# Patient Record
Sex: FEMALE | Race: WHITE | NOT HISPANIC OR LATINO | Employment: FULL TIME | URBAN - METROPOLITAN AREA
[De-identification: names, ages, dates, MRNs, and addresses within clinical notes are randomized per-mention and may not be internally consistent; named-entity substitution may affect disease eponyms.]

---

## 2017-04-25 ENCOUNTER — OFFICE VISIT (OUTPATIENT)
Dept: GASTROENTEROLOGY | Facility: CLINIC | Age: 57
End: 2017-04-25

## 2017-04-25 VITALS
HEIGHT: 62 IN | DIASTOLIC BLOOD PRESSURE: 98 MMHG | BODY MASS INDEX: 31.8 KG/M2 | WEIGHT: 172.8 LBS | TEMPERATURE: 98.9 F | SYSTOLIC BLOOD PRESSURE: 128 MMHG | HEART RATE: 65 BPM | OXYGEN SATURATION: 96 %

## 2017-04-25 DIAGNOSIS — R93.5 ABNORMAL ABDOMINAL CT SCAN: ICD-10-CM

## 2017-04-25 DIAGNOSIS — E66.9 OBESITY, CLASS I, BMI 30-34.9: ICD-10-CM

## 2017-04-25 DIAGNOSIS — Z80.0 FAMILY HISTORY OF COLON CANCER: ICD-10-CM

## 2017-04-25 DIAGNOSIS — R10.10 UPPER ABDOMINAL PAIN: Primary | ICD-10-CM

## 2017-04-25 DIAGNOSIS — K63.5 COLON POLYPS: ICD-10-CM

## 2017-04-25 DIAGNOSIS — Z79.1 NSAID LONG-TERM USE: ICD-10-CM

## 2017-04-25 PROCEDURE — 99214 OFFICE O/P EST MOD 30 MIN: CPT | Performed by: NURSE PRACTITIONER

## 2017-04-25 RX ORDER — SIMVASTATIN 40 MG
40 TABLET ORAL NIGHTLY
COMMUNITY
End: 2017-09-06

## 2017-04-25 RX ORDER — PROPRANOLOL HYDROCHLORIDE 60 MG/1
60 TABLET ORAL DAILY
COMMUNITY
End: 2018-08-29 | Stop reason: SDUPTHER

## 2017-04-25 RX ORDER — TRAZODONE HYDROCHLORIDE 150 MG/1
150 TABLET ORAL NIGHTLY
COMMUNITY
End: 2019-05-23 | Stop reason: SDUPTHER

## 2017-04-25 RX ORDER — OMEPRAZOLE 40 MG/1
40 CAPSULE, DELAYED RELEASE ORAL DAILY
COMMUNITY
End: 2017-04-25

## 2017-04-25 RX ORDER — CETIRIZINE HYDROCHLORIDE 10 MG/1
10 TABLET ORAL DAILY
COMMUNITY
End: 2019-05-23 | Stop reason: SDUPTHER

## 2017-04-25 RX ORDER — PANTOPRAZOLE SODIUM 40 MG/1
40 TABLET, DELAYED RELEASE ORAL DAILY
Qty: 30 TABLET | Refills: 2 | Status: SHIPPED | OUTPATIENT
Start: 2017-04-25 | End: 2017-08-09 | Stop reason: SDUPTHER

## 2017-04-25 NOTE — PROGRESS NOTES
OUTPATIENT ESTABLISHED PATIENT NOTE  Patient: BIANKA FISHER : 1960  Date of Service: 2017  CC: Abdominal Pain  Assessment/Plan                                             ASSESSMENT & PLANS     Upper abdominal pain r/t stomach/duodenal ulcers vs biliary colic/common bile duct stone vs referred pain? Of endometriosis  Hx of NSAID-induced gastric ulcer .  Healed ulcer confirmed    · Start pantoprazole (PROTONIX) 40 MG EC tablet; Take 1 tablet by mouth Daily. Take first thing in the morning on an empty stomach.  Wait at least 30 min to 1hr before eating.  · Obtain outside record of blood work done at employee health  · Will consider abd US  to check for possible common bile duct stone  · Pt is encouraged avoiding all NSAIDS    Abnormal abdominal CT scan.   Endometriosis  · Follow up with OB/GYN physician    Diverticulosis (Sigmoid colon). Hx of Colon polyps (Hyperplastic). Hx of ccy  Family history of colon cancer  Obesity, Class I, BMI 30-34.9  · I will discuss w/ Dr Bob to see if a repeat colonoscopy is warranted, considering she has one done less than 2 yrs ago and w/ relatively benign findings.  Pt has no known anemia. No change in bowel habit.  If anything, in BM pattern now is more regular than before. She has minimal lifestyle risk factors.  Although CT showed mucosal thickening of the transverse colon, she has no sx of any inflammation.        DISCUSSION: The above plan was delineated in details with patient and all questions and concerns were answered.  Patient is also given contact information.  Patient is to return as scheduled or sooner if new problems arise  Subjective                                                     SUBJECTIVE   History of Present Illness  Ms. Bianka Fisher is a 57 y.o. female, longtime pt of Dr Bob's, presents to the clinic today for an evaluation of Abdominal Pain  She has hx of gastric ulcer x 1 (superficial per EGD 2011) induced by NSAIDS.  Repeat EGD,  "done on 10/20/11 by Dr. Bob, showed resolved gastric ulcer, hiatal hernia.  Patient was to continue PPI.  Hx of diverticulosis. She has had multiple colonoscopies done.  Last Colonoscopy, done on 5/4/15 by Dr. Bob, showed ascending colon polyps x 2 in the descending and sigmoid colon (hyperplastic-bx).  Patient was recommended to have a repeat colonoscopy in 5 years.  With this onset pain, pt is concerned if she has colon cancer.     Abdominal Pain  Onset 3-4 wks ago   Location Upper midline above the umbilicus, but also nonspecific generalized abdominal pain     Triggers  Worsens by Eating. No specific type food, even if she eats \"a grape\"      Severity/Quality/  Frequency Dull, ache, nagging   Pain does not wake pt up at night, but does not sleep well d/t pain     Relieving factors  Nothing.  Having a BM does not relieve pain     Associating Sx  + and - ++Bloating, abd distention.++occasional heartburn    Pt denies fever, chills, night sweats, or unintentional weight loss.Pt denies anorexia, nausea, vomiting, dysphagia, odynophagia, reflux, regurgitation, early satiety.  Pt has good appetite.       At baseline, pt has diarrhea, alternating w/ constipation. Now since the onset of abd pain, her pt is able to have BM daily w/ normal, soft stools.  Pt denies dark black stools or bright red blood in the stools, in the toilet bowl, or on the toilet tissue.     Prior Dx workup CT scan abdomen and pelvis With and without contrast, done on 4/19/17 [facility], per outside record:  showed:  · Mucosal thickening of the transverse colon.  Mild diverticulosis the sigmoid colon  · Irregular hypodense mottled appearance to the uterus.  The endometrium appears abnormally thickened at 1.8 cm.  The cervix is not well defined.    · Multiple calcified granuloma  · Liver,, intrahepatic ducts, pancreas, spleen    OB-GYN US done yesterday.  Pt does not know its result yet. Pt is yet scheduled to see MD either.    "   Blood work done at employee health and was told that liver and pancreatic levels are normal.  No anemia. Pt has no current PCP     Prior Tx Has not take Prilosec d/t lack of heartburn sx     Pertinent Hx Hx of endometriosis for many yrs  Cholecystectomy at age 26 b/c diseased gallbladder     Risk factors  Takes Aleve seldm PRN. Never a smoker.  No ETOH. Obese BMI 32     ROS:Review of Systems   Constitutional: Negative for activity change, appetite change, fatigue, fever and unexpected weight change.   HENT: Negative for hearing loss, trouble swallowing and voice change.    Eyes: Negative for visual disturbance.   Respiratory: Negative for cough, choking, chest tightness and shortness of breath.    Cardiovascular: Negative for chest pain.   Gastrointestinal: Positive for abdominal distention (bloating) and abdominal pain. Negative for anal bleeding, blood in stool, constipation, diarrhea, nausea, rectal pain and vomiting.        Heartburn   Endocrine: Negative for polydipsia and polyphagia.   Genitourinary: Negative.    Musculoskeletal: Negative for gait problem and joint swelling.   Skin: Negative for color change and rash.   Allergic/Immunologic: Negative for food allergies.   Neurological: Positive for headaches. Negative for dizziness, seizures and speech difficulty.   Hematological: Negative for adenopathy.   Psychiatric/Behavioral: Positive for sleep disturbance. Negative for confusion.     PAST MED HX: Pt  has a past medical history of GERD (gastroesophageal reflux disease); Hyperlipidemia; and Insomnia.  PAST SURG HX: Pt  has a past surgical history that includes Cholecystectomy; Colonoscopy; Tonsillectomy; and  section, classic.  FAM HX: Maternal uncle and maternal first cousin at an early age 20s-40s.   SOC HX: Pt  reports that she has never smoked. She does not have any smokeless tobacco history on file. She reports that she does not drink alcohol.  Objective                                                            OBJECTIVE   Allergy: Pt has No Known Allergies.  MEDS: •  cetirizine (zyrTEC) 10 MG tablet, Take 10 mg by mouth Daily., Disp: , Rfl:   •  omeprazole (priLOSEC) 40 MG capsule PRN  •  propranolol (INDERAL) 60 MG tablet, Take 60 mg by mouth Daily., Disp: , Rfl:   •  simvastatin (ZOCOR) 40 MG tablet, Take 40 mg by mouth Every Night., Disp: , Rfl:   •  traZODone (DESYREL) 150 MG tablet, Take 150 mg by mouth Every Night., Disp: , Rfl:     Wt Readings from Last 5 Encounters:   04/25/17 172 lb 12.8 oz (78.4 kg)   body mass index is 31.61 kg/(m^2).,Temp: 98.9 °F (37.2 °C),BP: 128/98,Heart Rate: 65   Physical Exam  General Well developed; well nourished; no acute distress.   ENT Good dentition.  Oral mucosa pink & moist without thrush or lesions.    Neck Neck supple; trachea midline. No thyromegaly   Resp CTA; no rhonchi, rales, or wheezes.  Respiration effort normal  CV RRR; normal S1, S2; no M/R/G. No lower extremity edema  GI Abd soft, NT, including LLQ deep palpation, ND, normal active bowel sounds.  Old abd scar fr ccy  Skin No rash; no lesions; no bruises.  Skin turgor normal  Musc No clubbing; no cyanosis.  Gait steady  Psych Oriented to time, place, and person.  Appropriate affect  Thank you kindly for allowing me to be part of this patient’s care.    Pt care team: Tatiana SANFORD & Too [place], [facility]--Gastroenterology  [phone]    CC: Dr.Christine Michell Torres, [place] [address] FAX:[phone]      Please note that portions of this note were completed with a voice-recognition program. Efforts were made to edit the dictations, but occasionally words are mistranscribed.

## 2017-04-25 NOTE — PATIENT INSTRUCTIONS
Avoid NSAIDs (Non-Steroid Anti-Inflammatory Drugs) products (i.e Ibuprofen, Aleve, Advil, Exedrin, BC Powder, diclofenac, meloxicam, and Naproxen, etc.). Talk to your doctor/medical provider for alternative approach for management such as physical therapy, exercises, muscle relaxants, etc.     In some cases, your doctor will have to keep you on NSAIDS products to treat your other medical conditions.  Should you need to continue any NSAID product,  please continue to take your acid reducer.  Be be aware of long-term and frequent NSAIDS' potential side effects. These include, but limited to, stomach ulcer, bleeding risks, and kidney injury. Take the medication with food and to stop if you experience any GI upset. Call if you have vomitting, abdominal pain, or black/bloody stools.

## 2017-04-26 ENCOUNTER — TELEPHONE (OUTPATIENT)
Dept: GASTROENTEROLOGY | Facility: CLINIC | Age: 57
End: 2017-04-26

## 2017-04-26 DIAGNOSIS — Z79.1 NSAID LONG-TERM USE: ICD-10-CM

## 2017-04-26 DIAGNOSIS — R10.10 UPPER ABDOMINAL PAIN: ICD-10-CM

## 2017-04-26 NOTE — TELEPHONE ENCOUNTER
Patient called to see if Tatiana discuss her case with Dr. Bob. Will Taisha be called into her prefer pharmacy.

## 2017-04-27 NOTE — TELEPHONE ENCOUNTER
Attempting to call.  No answer. Left voicemail     patient's case is discussed with Dr. Bob.        Dr. Bob agrees with my recommendation of consistent PPI daily instead of PPI PRN.  Dr. Bob also agrees with patient to follow up with her OB/GYN physician, regarding endometriosis.     No indication for repeat colonoscopy due to recent colonoscopy in 2015 is unremarkable with the exception of colon polyps and sigmoid diverticulosis, which wouldn't be contributing to her upper abdominal pain that she described.     If sx persist despite daily PPI after about 4-6 wks, then we may consider repeating an EGD d/t hx of PUD

## 2017-08-02 ENCOUNTER — CONSULT (OUTPATIENT)
Dept: CARDIOLOGY | Facility: CLINIC | Age: 57
End: 2017-08-02

## 2017-08-02 VITALS
SYSTOLIC BLOOD PRESSURE: 167 MMHG | HEIGHT: 62 IN | HEART RATE: 65 BPM | DIASTOLIC BLOOD PRESSURE: 93 MMHG | WEIGHT: 174.8 LBS | BODY MASS INDEX: 32.17 KG/M2

## 2017-08-02 DIAGNOSIS — I73.9 CLAUDICATION (HCC): ICD-10-CM

## 2017-08-02 DIAGNOSIS — E78.2 MIXED HYPERLIPIDEMIA: Primary | ICD-10-CM

## 2017-08-02 DIAGNOSIS — I70.0 ATHEROSCLEROSIS OF AORTA (HCC): ICD-10-CM

## 2017-08-02 DIAGNOSIS — R07.2 PRECORDIAL PAIN: ICD-10-CM

## 2017-08-02 PROCEDURE — 99214 OFFICE O/P EST MOD 30 MIN: CPT | Performed by: INTERNAL MEDICINE

## 2017-08-02 PROCEDURE — 93000 ELECTROCARDIOGRAM COMPLETE: CPT | Performed by: INTERNAL MEDICINE

## 2017-08-02 NOTE — PROGRESS NOTES
"[facility] at [facility]  Consultation H&P  Bianka Fisher  1960  [phone]    VISIT DATE:  08/02/2017    PCP: BRAD Pool  [address] [place] [id number]    CC:  Chief Complaint   Patient presents with   • Chest Pain     consult   • Dizziness   • Edema       ASSESSMENT:   Diagnosis Plan   1. Mixed hyperlipidemia     2. Atherosclerosis of aorta     3. Precordial pain  Adult Stress Echo Only   4. Claudication  Doppler Ankle Brachial Index Multi Level CAR       PLAN:  Stress echocardiogram for cardiac risk stratification  ABIs  We'll review most recent fasting lipid panel, goal LDL less than 100, may need to switch to a more potent statin  Patient keep blood pressure log and follow up in 4 weeks, goal less than 140/90.  Heart healthy diet(Mediterranean)    History of Present Illness   57-year-old female who is noted to have atherosclerosis of her descending aorta on recent CT abdomen.  She has long-standing history of dyslipidemia.  Blood pressures at home previously run less than 140/90.  Recent measurements have been slightly elevated.  She has had a chronic history of recurrent left-sided chest discomfort.  Reports sharp to dull left precordial chest discomfort which usually occurs at rest.  Last less than a minute.  No obvious triggers.  Nonradiating.  Has baseline shortness of breath and a class II pattern with ongoing weight gain.  Reports mild lower extremity edema which worsens throughout the day.  Also has fatigue and weakness in her legs when she walks more than a block.  Nonsmoker.  Takes propranolol for tremors.    PHYSICAL EXAMINATION:  Vitals:    08/02/17 0929   BP: 167/93   BP Location: Left arm   Patient Position: Sitting   Pulse: 65   Weight: 174 lb 12.8 oz (79.3 kg)   Height: 62\" (157.5 cm)     General Appearance:    Alert, cooperative, no distress, appears stated age   Head:    Normocephalic, without obvious abnormality, atraumatic   Eyes:    PERRL, " conjunctiva/corneas clear, EOM's intact, fundi     benign, both eyes   Ears:    Normal TM's and external ear canals, both ears   Nose:   Nares normal, septum midline, mucosa normal, no drainage    or sinus tenderness   Throat:   Lips, mucosa, and tongue normal; teeth and gums normal   Neck:   Supple, symmetrical, trachea midline, no adenopathy;     thyroid:  no enlargement/tenderness/nodules; no carotid    bruit or JVD   Back:     Symmetric, no curvature, ROM normal, no CVA tenderness   Lungs:     Clear to auscultation bilaterally, respirations unlabored   Chest Wall:    No tenderness or deformity    Heart:    Regular rate and rhythm, S1 and S2 normal, no murmur, rub   or gallop, normal carotid impulse bilaterally without bruit.   Abdomen:     Soft, non-tender, bowel sounds active all four quadrants,     no masses, no organomegaly   Extremities:   Extremities normal, atraumatic, no cyanosis, Trivial bilateral pedal and pretibial edema    Pulses:   2+ and symmetric all extremities   Skin:   Skin color, texture, turgor normal, no rashes or lesions   Lymph nodes:   Cervical, supraclavicular, and axillary nodes normal   Neurologic:   normal strength, sensation and reflexes     throughout       Diagnostic Data:    ECG 12 Lead  Date/Time: 8/2/2017 10:00 AM  Performed by: JOSE JAIN III  Authorized by: JOSE JAIN III   Rhythm: sinus rhythm  Rate: normal  Conduction: conduction normal  ST Segments: ST segments normal  T Waves: T waves normal  QRS axis: normal  Clinical impression: normal ECG          No results found for: CHLPL, TRIG, HDL, LDLDIRECT  No results found for: GLUCOSE, BUN, CREATININE, NA, K, CL, CO2, CREATININE, BUN  No results found for: HGBA1C  No results found for: WBC, HGB, HCT, PLT    PROBLEM LIST:  Patient Active Problem List   Diagnosis   • Precordial pain   • Mixed hyperlipidemia   • Claudication   • Atherosclerosis of aorta       PAST MEDICAL HX  Past Medical History:   Diagnosis Date   • Anxiety     • Asthma    • GERD (gastroesophageal reflux disease)    • Heart murmur    • Hyperlipidemia    • Insomnia    • Skin cancer        Allergies  No Known Allergies    Current Medications    Current Outpatient Prescriptions:   •  cetirizine (zyrTEC) 10 MG tablet, Take 10 mg by mouth Daily., Disp: , Rfl:   •  pantoprazole (PROTONIX) 40 MG EC tablet, Take 1 tablet by mouth Daily. Take first thing in the morning on an empty stomach.  Wait at least 30 min to 1hr before eating., Disp: 30 tablet, Rfl: 2  •  propranolol (INDERAL) 60 MG tablet, Take 60 mg by mouth Daily., Disp: , Rfl:   •  simvastatin (ZOCOR) 40 MG tablet, Take 40 mg by mouth Every Night., Disp: , Rfl:   •  traZODone (DESYREL) 150 MG tablet, Take 150 mg by mouth Every Night., Disp: , Rfl:          ROS  Review of Systems   Constitution: Positive for malaise/fatigue, night sweats and weight gain.   HENT: Positive for ear pain, headaches and hearing loss.    Eyes: Positive for blurred vision.   Cardiovascular: Positive for chest pain, leg swelling and near-syncope.   Respiratory: Positive for snoring.    Endocrine: Positive for cold intolerance.   Musculoskeletal: Positive for arthritis, back pain and muscle cramps.   Gastrointestinal: Positive for bloating, abdominal pain, change in bowel habit, constipation, diarrhea and flatus.   Neurological: Positive for loss of balance and tremors.   Psychiatric/Behavioral:        Decreased concentration   Allergic/Immunologic: Positive for environmental allergies.       SOCIAL HX  Social History     Social History   • Marital status:      Spouse name: N/A   • Number of children: N/A   • Years of education: N/A     Occupational History   • Not on file.     Social History Main Topics   • Smoking status: Never Smoker   • Smokeless tobacco: Not on file   • Alcohol use No   • Drug use: No   • Sexual activity: Defer     Other Topics Concern   • Not on file     Social History Narrative       FAMILY HX  Family History    Problem Relation Age of Onset   • Lung cancer Mother    • Skin cancer Sister    • Breast cancer Neg Hx    • Endometrial cancer Neg Hx    • Ovarian cancer Neg Hx              Valentino Bates III, MD, FACC

## 2017-08-03 ENCOUNTER — TRANSCRIBE ORDERS (OUTPATIENT)
Dept: MAMMOGRAPHY | Facility: HOSPITAL | Age: 57
End: 2017-08-03

## 2017-08-03 DIAGNOSIS — N63.0 BREAST LUMP: Primary | ICD-10-CM

## 2017-08-03 DIAGNOSIS — Z12.31 SCREENING MAMMOGRAM, ENCOUNTER FOR: Primary | ICD-10-CM

## 2017-08-04 ENCOUNTER — HOSPITAL ENCOUNTER (OUTPATIENT)
Dept: MAMMOGRAPHY | Facility: HOSPITAL | Age: 57
Discharge: HOME | End: 2017-08-04
Attending: OBSTETRICS & GYNECOLOGY | Admitting: OBSTETRICS & GYNECOLOGY

## 2017-08-04 ENCOUNTER — LAB REQUISITION (OUTPATIENT)
Dept: LAB | Facility: HOSPITAL | Age: 57
End: 2017-08-04

## 2017-08-04 ENCOUNTER — HOSPITAL ENCOUNTER (OUTPATIENT)
Dept: ULTRASOUND IMAGING | Facility: HOSPITAL | Age: 57
Discharge: HOME | End: 2017-08-04

## 2017-08-04 ENCOUNTER — TRANSCRIBE ORDERS (OUTPATIENT)
Dept: MAMMOGRAPHY | Facility: HOSPITAL | Age: 57
End: 2017-08-04

## 2017-08-04 DIAGNOSIS — N63.0 BREAST LUMP: ICD-10-CM

## 2017-08-04 DIAGNOSIS — R92.8 ABNORMAL MAMMOGRAM: Primary | ICD-10-CM

## 2017-08-04 PROCEDURE — 87205 SMEAR GRAM STAIN: CPT | Performed by: RADIOLOGY

## 2017-08-04 PROCEDURE — 77062 BREAST TOMOSYNTHESIS BI: CPT | Performed by: RADIOLOGY

## 2017-08-04 PROCEDURE — 77066 DX MAMMO INCL CAD BI: CPT | Performed by: RADIOLOGY

## 2017-08-04 PROCEDURE — 76642 ULTRASOUND BREAST LIMITED: CPT

## 2017-08-04 PROCEDURE — G0204 DX MAMMO INCL CAD BI: HCPCS

## 2017-08-04 PROCEDURE — 76642 ULTRASOUND BREAST LIMITED: CPT | Performed by: RADIOLOGY

## 2017-08-04 PROCEDURE — 87186 SC STD MICRODIL/AGAR DIL: CPT | Performed by: RADIOLOGY

## 2017-08-04 PROCEDURE — 87070 CULTURE OTHR SPECIMN AEROBIC: CPT | Performed by: RADIOLOGY

## 2017-08-04 PROCEDURE — G0279 TOMOSYNTHESIS, MAMMO: HCPCS

## 2017-08-04 PROCEDURE — 76942 ECHO GUIDE FOR BIOPSY: CPT

## 2017-08-04 PROCEDURE — 87147 CULTURE TYPE IMMUNOLOGIC: CPT | Performed by: RADIOLOGY

## 2017-08-04 PROCEDURE — 87077 CULTURE AEROBIC IDENTIFY: CPT | Performed by: RADIOLOGY

## 2017-08-04 PROCEDURE — 19000 PUNCTURE ASPIR CYST BREAST: CPT | Performed by: RADIOLOGY

## 2017-08-04 RX ORDER — LIDOCAINE HYDROCHLORIDE AND EPINEPHRINE 10; 10 MG/ML; UG/ML
10 INJECTION, SOLUTION INFILTRATION; PERINEURAL ONCE
Status: DISCONTINUED | OUTPATIENT
Start: 2017-08-04 | End: 2017-09-06

## 2017-08-04 RX ORDER — LIDOCAINE HYDROCHLORIDE 10 MG/ML
5 INJECTION, SOLUTION INFILTRATION; PERINEURAL ONCE
Status: COMPLETED | OUTPATIENT
Start: 2017-08-04 | End: 2017-08-04

## 2017-08-04 RX ADMIN — LIDOCAINE HYDROCHLORIDE 4 ML: 10 INJECTION, SOLUTION INFILTRATION; PERINEURAL at 13:15

## 2017-08-09 ENCOUNTER — HOSPITAL ENCOUNTER (OUTPATIENT)
Dept: MAMMOGRAPHY | Facility: HOSPITAL | Age: 57
Discharge: HOME | End: 2017-08-09

## 2017-08-09 ENCOUNTER — HOSPITAL ENCOUNTER (OUTPATIENT)
Dept: ULTRASOUND IMAGING | Facility: HOSPITAL | Age: 57
Discharge: HOME | End: 2017-08-09
Admitting: RADIOLOGY

## 2017-08-09 ENCOUNTER — TELEPHONE (OUTPATIENT)
Dept: GASTROENTEROLOGY | Facility: CLINIC | Age: 57
End: 2017-08-09

## 2017-08-09 DIAGNOSIS — R92.8 ABNORMAL MAMMOGRAM: ICD-10-CM

## 2017-08-09 LAB
LAB AP CASE REPORT: NORMAL
Lab: NORMAL
PATH REPORT.FINAL DX SPEC: NORMAL

## 2017-08-09 PROCEDURE — 19083 BX BREAST 1ST LESION US IMAG: CPT | Performed by: RADIOLOGY

## 2017-08-09 PROCEDURE — 77065 DX MAMMO INCL CAD UNI: CPT | Performed by: RADIOLOGY

## 2017-08-09 PROCEDURE — 88305 TISSUE EXAM BY PATHOLOGIST: CPT | Performed by: RADIOLOGY

## 2017-08-09 RX ORDER — LIDOCAINE HYDROCHLORIDE AND EPINEPHRINE 10; 10 MG/ML; UG/ML
10 INJECTION, SOLUTION INFILTRATION; PERINEURAL ONCE
Status: DISCONTINUED | OUTPATIENT
Start: 2017-08-09 | End: 2017-09-06

## 2017-08-09 RX ORDER — LIDOCAINE HYDROCHLORIDE 10 MG/ML
5 INJECTION, SOLUTION INFILTRATION; PERINEURAL ONCE
Status: DISCONTINUED | OUTPATIENT
Start: 2017-08-09 | End: 2017-09-06

## 2017-08-09 RX ORDER — PANTOPRAZOLE SODIUM 40 MG/1
40 TABLET, DELAYED RELEASE ORAL DAILY
Qty: 30 TABLET | Refills: 2 | Status: SHIPPED | OUTPATIENT
Start: 2017-08-09 | End: 2017-12-28 | Stop reason: SDUPTHER

## 2017-08-11 LAB
CYTO UR: NORMAL
LAB AP CASE REPORT: NORMAL
LAB AP CLINICAL INFORMATION: NORMAL
LAB AP DIAGNOSIS COMMENT: NORMAL
Lab: NORMAL
PATH REPORT.FINAL DX SPEC: NORMAL
PATH REPORT.GROSS SPEC: NORMAL

## 2017-08-13 LAB
BACTERIA SPEC AEROBE CULT: ABNORMAL
GRAM STN SPEC: ABNORMAL
GRAM STN SPEC: ABNORMAL

## 2017-08-21 ENCOUNTER — TELEPHONE (OUTPATIENT)
Dept: MAMMOGRAPHY | Facility: HOSPITAL | Age: 57
End: 2017-08-21

## 2017-08-23 ENCOUNTER — HOSPITAL ENCOUNTER (OUTPATIENT)
Dept: CARDIOLOGY | Facility: HOSPITAL | Age: 57
Discharge: HOME | End: 2017-08-23
Attending: INTERNAL MEDICINE

## 2017-08-23 ENCOUNTER — HOSPITAL ENCOUNTER (OUTPATIENT)
Dept: CARDIOLOGY | Facility: HOSPITAL | Age: 57
Discharge: HOME | End: 2017-08-23
Attending: INTERNAL MEDICINE | Admitting: INTERNAL MEDICINE

## 2017-08-23 VITALS
SYSTOLIC BLOOD PRESSURE: 140 MMHG | HEIGHT: 62 IN | DIASTOLIC BLOOD PRESSURE: 100 MMHG | BODY MASS INDEX: 32.02 KG/M2 | WEIGHT: 174 LBS

## 2017-08-23 DIAGNOSIS — R07.2 PRECORDIAL PAIN: ICD-10-CM

## 2017-08-23 LAB
BH CV ECHO MEAS - BSA(HAYCOCK): 1.9 M^2
BH CV ECHO MEAS - BSA: 1.8 M^2
BH CV ECHO MEAS - BZI_BMI: 31.8 KILOGRAMS/M^2
BH CV ECHO MEAS - BZI_METRIC_HEIGHT: 157.5 CM
BH CV ECHO MEAS - BZI_METRIC_WEIGHT: 78.9 KG
BH CV LOWER ARTERIAL LEFT ABI RATIO: 1.15
BH CV LOWER ARTERIAL LEFT DORSALIS PEDIS SYS MAX: 139 MMHG
BH CV LOWER ARTERIAL LEFT POST TIBIAL SYS MAX: 183 MMHG
BH CV LOWER ARTERIAL LEFT TBI RATIO: 1.25
BH CV LOWER ARTERIAL RIGHT ABI RATIO: 1.14
BH CV LOWER ARTERIAL RIGHT DORSALIS PEDIS SYS MAX: 181 MMHG
BH CV LOWER ARTERIAL RIGHT POST TIBIAL SYS MAX: 169 MMHG
BH CV LOWER ARTERIAL RIGHT TBI RATIO: 1.22
BH CV STRESS BP STAGE 1: NORMAL
BH CV STRESS BP STAGE 2: NORMAL
BH CV STRESS DURATION MIN STAGE 1: 3
BH CV STRESS DURATION MIN STAGE 2: 3
BH CV STRESS DURATION MIN STAGE 3: 1
BH CV STRESS DURATION SEC STAGE 1: 0
BH CV STRESS DURATION SEC STAGE 2: 0
BH CV STRESS DURATION SEC STAGE 3: 7
BH CV STRESS GRADE STAGE 1: 10
BH CV STRESS GRADE STAGE 2: 12
BH CV STRESS GRADE STAGE 3: 14
BH CV STRESS HR STAGE 1: 114
BH CV STRESS HR STAGE 2: 133
BH CV STRESS HR STAGE 3: 148
BH CV STRESS METS STAGE 1: 5
BH CV STRESS METS STAGE 2: 7.5
BH CV STRESS METS STAGE 3: 10
BH CV STRESS O2 STAGE 1: 95
BH CV STRESS O2 STAGE 2: 97
BH CV STRESS PROTOCOL 1: NORMAL
BH CV STRESS RECOVERY BP: NORMAL MMHG
BH CV STRESS RECOVERY HR: 86 BPM
BH CV STRESS SPEED STAGE 1: 1.7
BH CV STRESS SPEED STAGE 2: 2.5
BH CV STRESS SPEED STAGE 3: 3.4
BH CV STRESS STAGE 1: 1
BH CV STRESS STAGE 2: 2
BH CV STRESS STAGE 3: 3
BH CV VAS BP RIGHT ARM: NORMAL MMHG
MAXIMAL PREDICTED HEART RATE: 163 BPM
PERCENT MAX PREDICTED HR: 90.8 %
STRESS BASELINE BP: NORMAL MMHG
STRESS BASELINE HR: 65 BPM
STRESS PERCENT HR: 107 %
STRESS POST ESTIMATED WORKLOAD: 10.1 METS
STRESS POST EXERCISE DUR MIN: 7 MIN
STRESS POST EXERCISE DUR SEC: 7 SEC
STRESS POST PEAK BP: NORMAL MMHG
STRESS POST PEAK HR: 148 BPM
STRESS TARGET HR: 139 BPM
UPPER ARTERIAL LEFT ARM BRACHIAL SYS MAX: 159 MMHG
UPPER ARTERIAL RIGHT ARM BRACHIAL SYS MAX: 158 MMHG

## 2017-08-23 PROCEDURE — 93017 CV STRESS TEST TRACING ONLY: CPT

## 2017-08-23 PROCEDURE — 93350 STRESS TTE ONLY: CPT | Performed by: INTERNAL MEDICINE

## 2017-08-23 PROCEDURE — 93350 STRESS TTE ONLY: CPT

## 2017-08-23 PROCEDURE — 93923 UPR/LXTR ART STDY 3+ LVLS: CPT | Performed by: INTERNAL MEDICINE

## 2017-08-23 PROCEDURE — 25010000002 SULFUR HEXAFLUORIDE MICROSPH 60.7-25 MG RECONSTITUTED SUSPENSION: Performed by: INTERNAL MEDICINE

## 2017-08-23 PROCEDURE — 93923 UPR/LXTR ART STDY 3+ LVLS: CPT

## 2017-08-23 PROCEDURE — C8928 TTE W OR W/O FOL W/CON,STRES: HCPCS

## 2017-08-23 PROCEDURE — 93018 CV STRESS TEST I&R ONLY: CPT | Performed by: INTERNAL MEDICINE

## 2017-08-23 PROCEDURE — 93352 ADMIN ECG CONTRAST AGENT: CPT | Performed by: INTERNAL MEDICINE

## 2017-08-23 RX ORDER — CEPHALEXIN 500 MG/1
500 CAPSULE ORAL 2 TIMES DAILY
COMMUNITY
End: 2017-09-06

## 2017-08-23 RX ADMIN — SULFUR HEXAFLUORIDE 4 ML: KIT at 14:00

## 2017-09-06 ENCOUNTER — OFFICE VISIT (OUTPATIENT)
Dept: CARDIOLOGY | Facility: CLINIC | Age: 57
End: 2017-09-06

## 2017-09-06 VITALS
BODY MASS INDEX: 32.39 KG/M2 | SYSTOLIC BLOOD PRESSURE: 150 MMHG | DIASTOLIC BLOOD PRESSURE: 80 MMHG | HEART RATE: 66 BPM | WEIGHT: 176 LBS | HEIGHT: 62 IN

## 2017-09-06 DIAGNOSIS — E78.2 MIXED HYPERLIPIDEMIA: Primary | ICD-10-CM

## 2017-09-06 DIAGNOSIS — I70.0 ATHEROSCLEROSIS OF AORTA (HCC): ICD-10-CM

## 2017-09-06 DIAGNOSIS — I10 ESSENTIAL HYPERTENSION: ICD-10-CM

## 2017-09-06 PROCEDURE — 99214 OFFICE O/P EST MOD 30 MIN: CPT | Performed by: INTERNAL MEDICINE

## 2017-09-06 RX ORDER — LOSARTAN POTASSIUM 25 MG/1
25 TABLET ORAL DAILY
Qty: 30 TABLET | Refills: 5 | Status: SHIPPED | OUTPATIENT
Start: 2017-09-06 | End: 2017-12-18

## 2017-09-06 RX ORDER — ATORVASTATIN CALCIUM 40 MG/1
40 TABLET, FILM COATED ORAL DAILY
Qty: 30 TABLET | Refills: 5 | Status: SHIPPED | OUTPATIENT
Start: 2017-09-06 | End: 2017-12-18 | Stop reason: SDUPTHER

## 2017-09-06 NOTE — PROGRESS NOTES
"[facility] at [facility]  Office visit  Bianka Fisher  1960  [phone]    VISIT DATE:  09/06/2017    PCP: BRAD Pool  [address] [address]    CC:  Chief Complaint   Patient presents with   • Hypertension     follow up       ASSESSMENT:   Diagnosis Plan   1. Mixed hyperlipidemia  Lipid Panel    Comprehensive Metabolic Panel   2. Atherosclerosis of aorta     3. Essential hypertension         PLAN:  Switching simvastatin to atorvastatin, goal LDL less than 100  Continue propranolol  Adding losartan 25 mg by mouth daily, goal blood pressure less than 140/90  Follow-up in 3 months with a CMP and lipid panel    Subjective  Systolic blood pressures are still running in the 140-175 mmHg range.  Intermittent left sided sharp stabbing chest pain which worsens with certain movements.  brief in nature.  Reviewed stress echocardiogram which was unremarkable.  ABIs negative for obstructive atherosclerosis.    PHYSICAL EXAMINATION:  Vitals:    09/06/17 1514   BP: 150/80   BP Location: Left arm   Patient Position: Sitting   Pulse: 66   Weight: 176 lb (79.8 kg)   Height: 62\" (157.5 cm)     General Appearance:    Alert, cooperative, no distress, appears stated age   Head:    Normocephalic, without obvious abnormality, atraumatic   Eyes:    conjunctiva/corneas clear   Nose:   Nares normal, septum midline, mucosa normal, no drainage   Throat:   Lips, teeth and gums normal   Neck:   Supple, symmetrical, trachea midline, no carotid    bruit or JVD   Lungs:     Clear to auscultation bilaterally, respirations unlabored   Chest Wall:    No tenderness or deformity    Heart:    Regular rate and rhythm, S1 and S2 normal, no murmur, rub   or gallop, normal carotid impulse bilaterally without bruit.   Abdomen:     Soft, non-tender   Extremities:   Extremities normal, atraumatic, no cyanosis or edema   Pulses:   2+ and symmetric all extremities   Skin:   Skin color, texture, turgor " normal, no rashes or lesions       Diagnostic Data:  Procedures  No results found for: CHLPL, TRIG, HDL, LDLDIRECT  No results found for: GLUCOSE, BUN, CREATININE, NA, K, CL, CO2, CREATININE, BUN  No results found for: HGBA1C  No results found for: WBC, HGB, HCT, PLT    Allergies  No Known Allergies    Current Medications    Current Outpatient Prescriptions:   •  cetirizine (zyrTEC) 10 MG tablet, Take 10 mg by mouth Daily., Disp: , Rfl:   •  pantoprazole (PROTONIX) 40 MG EC tablet, Take 1 tablet by mouth Daily. Take first thing in the morning on an empty stomach.  Wait at least 30 min to 1hr before eating., Disp: 30 tablet, Rfl: 2  •  propranolol (INDERAL) 60 MG tablet, Take 60 mg by mouth Daily., Disp: , Rfl:   •  traZODone (DESYREL) 150 MG tablet, Take 150 mg by mouth Every Night., Disp: , Rfl:   •  atorvastatin (LIPITOR) 40 MG tablet, Take 1 tablet by mouth Daily., Disp: 30 tablet, Rfl: 5  •  losartan (COZAAR) 25 MG tablet, Take 1 tablet by mouth Daily., Disp: 30 tablet, Rfl: 5  No current facility-administered medications for this visit.           ROS  Review of Systems   Constitution: Positive for night sweats and weight loss.   HENT: Positive for ear pain and headaches.    Cardiovascular: Positive for chest pain and leg swelling.   Neurological: Positive for tremors.   Allergic/Immunologic: Positive for environmental allergies.       SOCIAL HX  Social History     Social History   • Marital status:      Spouse name: N/A   • Number of children: N/A   • Years of education: N/A     Occupational History   • Not on file.     Social History Main Topics   • Smoking status: Never Smoker   • Smokeless tobacco: Not on file   • Alcohol use No   • Drug use: No   • Sexual activity: Defer     Other Topics Concern   • Not on file     Social History Narrative       FAMILY HX  Family History   Problem Relation Age of Onset   • Lung cancer Mother    • Skin cancer Sister    • Breast cancer Neg Hx    • Endometrial cancer  Neg Hx    • Ovarian cancer Neg Hx              Valentino Bates III, MD, FACC

## 2017-12-18 ENCOUNTER — APPOINTMENT (OUTPATIENT)
Dept: LAB | Facility: HOSPITAL | Age: 57
End: 2017-12-18

## 2017-12-18 ENCOUNTER — OFFICE VISIT (OUTPATIENT)
Dept: CARDIOLOGY | Facility: CLINIC | Age: 57
End: 2017-12-18

## 2017-12-18 VITALS
WEIGHT: 173.4 LBS | BODY MASS INDEX: 31.91 KG/M2 | SYSTOLIC BLOOD PRESSURE: 142 MMHG | DIASTOLIC BLOOD PRESSURE: 82 MMHG | HEIGHT: 62 IN

## 2017-12-18 DIAGNOSIS — I10 ESSENTIAL HYPERTENSION: ICD-10-CM

## 2017-12-18 DIAGNOSIS — E78.2 MIXED HYPERLIPIDEMIA: Primary | ICD-10-CM

## 2017-12-18 DIAGNOSIS — I70.0 ATHEROSCLEROSIS OF AORTA (HCC): ICD-10-CM

## 2017-12-18 LAB
ALBUMIN SERPL-MCNC: 4.2 G/DL (ref 3.2–4.8)
ALBUMIN/GLOB SERPL: 1.8 G/DL (ref 1.5–2.5)
ALP SERPL-CCNC: 98 U/L (ref 25–100)
ALT SERPL W P-5'-P-CCNC: 20 U/L (ref 7–40)
ANION GAP SERPL CALCULATED.3IONS-SCNC: 8 MMOL/L (ref 3–11)
ARTICHOKE IGE QN: 109 MG/DL (ref 0–130)
AST SERPL-CCNC: 20 U/L (ref 0–33)
BILIRUB SERPL-MCNC: 0.6 MG/DL (ref 0.3–1.2)
BUN BLD-MCNC: 11 MG/DL (ref 9–23)
BUN/CREAT SERPL: 11 (ref 7–25)
CALCIUM SPEC-SCNC: 9.3 MG/DL (ref 8.7–10.4)
CHLORIDE SERPL-SCNC: 105 MMOL/L (ref 99–109)
CHOLEST SERPL-MCNC: 160 MG/DL (ref 0–200)
CO2 SERPL-SCNC: 28 MMOL/L (ref 20–31)
CREAT BLD-MCNC: 1 MG/DL (ref 0.6–1.3)
GFR SERPL CREATININE-BSD FRML MDRD: 57 ML/MIN/1.73
GLOBULIN UR ELPH-MCNC: 2.3 GM/DL
GLUCOSE BLD-MCNC: 103 MG/DL (ref 70–100)
HDLC SERPL-MCNC: 42 MG/DL (ref 40–60)
POTASSIUM BLD-SCNC: 3.8 MMOL/L (ref 3.5–5.5)
PROT SERPL-MCNC: 6.5 G/DL (ref 5.7–8.2)
SODIUM BLD-SCNC: 141 MMOL/L (ref 132–146)
TRIGL SERPL-MCNC: 113 MG/DL (ref 0–150)

## 2017-12-18 PROCEDURE — 99213 OFFICE O/P EST LOW 20 MIN: CPT | Performed by: INTERNAL MEDICINE

## 2017-12-18 PROCEDURE — 80053 COMPREHEN METABOLIC PANEL: CPT | Performed by: INTERNAL MEDICINE

## 2017-12-18 PROCEDURE — 36415 COLL VENOUS BLD VENIPUNCTURE: CPT | Performed by: INTERNAL MEDICINE

## 2017-12-18 PROCEDURE — 80061 LIPID PANEL: CPT | Performed by: INTERNAL MEDICINE

## 2017-12-18 RX ORDER — ATORVASTATIN CALCIUM 40 MG/1
40 TABLET, FILM COATED ORAL DAILY
Qty: 90 TABLET | Refills: 1 | Status: SHIPPED | OUTPATIENT
Start: 2017-12-18 | End: 2018-10-29

## 2017-12-18 RX ORDER — LOSARTAN POTASSIUM 50 MG/1
50 TABLET ORAL DAILY
Qty: 90 TABLET | Refills: 1 | Status: SHIPPED | OUTPATIENT
Start: 2017-12-18 | End: 2018-08-27

## 2017-12-18 NOTE — PROGRESS NOTES
"[facility] at [facility]  Office visit  Bianka Fisher  1960  [phone]    VISIT DATE:  09/06/2017    PCP: Perri Torres, PA  [address] [address]    CC:  Chief Complaint   Patient presents with   • Chest Pain   • Hyperlipidemia   • Hypertension       ASSESSMENT:   Diagnosis Plan   1. Mixed hyperlipidemia     2. Atherosclerosis of aorta     3. Essential hypertension         PLAN:  Continue atorvastatin, goal LDL less than 100  Repeat fasting lipid panel pending.  Continue propranolol(predominantly being used for essential tremor)  Increasing losartan to 50 mg by mouth daily, goal blood pressure less than 130/80  Follow-up in 3 months      Subjective  Systolic blood pressures are still running in the 140-150 mmHg range.  Currently denies chest pain, dyspnea or palpitations.  She appears compliant with medical therapy.  Denies myalgias on statin.  Has not had a chance to have her lipid profile repeated.    PHYSICAL EXAMINATION:  Vitals:    12/18/17 1445   BP: 142/82   BP Location: Left arm   Patient Position: Sitting   Weight: 78.7 kg (173 lb 6.4 oz)   Height: 157.5 cm (62\")     General Appearance:    Alert, cooperative, no distress, appears stated age   Head:    Normocephalic, without obvious abnormality, atraumatic   Eyes:    conjunctiva/corneas clear   Nose:   Nares normal, septum midline, mucosa normal, no drainage   Throat:   Lips, teeth and gums normal   Neck:   Supple, symmetrical, trachea midline, no carotid    bruit or JVD   Lungs:     Clear to auscultation bilaterally, respirations unlabored   Chest Wall:    No tenderness or deformity    Heart:    Regular rate and rhythm, S1 and S2 normal, no murmur, rub   or gallop, normal carotid impulse bilaterally without bruit.   Abdomen:     Soft, non-tender   Extremities:   Extremities normal, atraumatic, no cyanosis or edema   Pulses:   2+ and symmetric all extremities   Skin:   Skin color, texture, turgor normal, " no rashes or lesions       Diagnostic Data:  Procedures  No results found for: CHLPL, TRIG, HDL, LDLDIRECT  No results found for: GLUCOSE, BUN, CREATININE, NA, K, CL, CO2, CREATININE, BUN  No results found for: HGBA1C  No results found for: WBC, HGB, HCT, PLT    Allergies  No Known Allergies    Current Medications    Current Outpatient Prescriptions:   •  atorvastatin (LIPITOR) 40 MG tablet, Take 1 tablet by mouth Daily., Disp: 90 tablet, Rfl: 1  •  cetirizine (zyrTEC) 10 MG tablet, Take 10 mg by mouth Daily., Disp: , Rfl:   •  pantoprazole (PROTONIX) 40 MG EC tablet, Take 1 tablet by mouth Daily. Take first thing in the morning on an empty stomach.  Wait at least 30 min to 1hr before eating., Disp: 30 tablet, Rfl: 2  •  propranolol (INDERAL) 60 MG tablet, Take 60 mg by mouth Daily., Disp: , Rfl:   •  traZODone (DESYREL) 150 MG tablet, Take 150 mg by mouth Every Night., Disp: , Rfl:   •  losartan (COZAAR) 50 MG tablet, Take 1 tablet by mouth Daily., Disp: 90 tablet, Rfl: 1          ROS  Review of Systems   Cardiovascular: Negative for chest pain, dyspnea on exertion, leg swelling and palpitations.   Respiratory: Positive for cough. Negative for wheezing.    Neurological: Positive for headaches and tremors.   Allergic/Immunologic: Positive for environmental allergies.       SOCIAL HX  Social History     Social History   • Marital status:      Spouse name: N/A   • Number of children: N/A   • Years of education: N/A     Occupational History   • Not on file.     Social History Main Topics   • Smoking status: Never Smoker   • Smokeless tobacco: Never Used   • Alcohol use No   • Drug use: No      Comment: quit when pt was in 20's   • Sexual activity: Defer     Other Topics Concern   • Not on file     Social History Narrative       FAMILY HX  Family History   Problem Relation Age of Onset   • Lung cancer Mother    • Skin cancer Sister    • Breast cancer Neg Hx    • Endometrial cancer Neg Hx    • Ovarian cancer Neg  Hx              Valentino Bates III, MD, FACC

## 2017-12-28 ENCOUNTER — TELEPHONE (OUTPATIENT)
Dept: GASTROENTEROLOGY | Facility: CLINIC | Age: 57
End: 2017-12-28

## 2017-12-28 DIAGNOSIS — Z79.1 NSAID LONG-TERM USE: ICD-10-CM

## 2017-12-28 DIAGNOSIS — R10.10 UPPER ABDOMINAL PAIN: ICD-10-CM

## 2017-12-28 RX ORDER — PANTOPRAZOLE SODIUM 40 MG/1
40 TABLET, DELAYED RELEASE ORAL DAILY
Qty: 30 TABLET | Refills: 5 | Status: SHIPPED | OUTPATIENT
Start: 2017-12-28 | End: 2018-10-29

## 2017-12-28 NOTE — TELEPHONE ENCOUNTER
"Patient called to see if you can refill her Pantoprazole 40 mg. Last seen seen six months ago by Modesto Duran. I tried to explained to patient that Tatiana was out of the office until 01/02/2018. She could get some Nexium or Omeprazole OTC or call her PCP office to see if he/she will refill medication until Tatiana return on Tuesday to see if she will approve refill. Patient was very rude with me on the phone. 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 He hasn't seen patient since 2015.        Please refill her generic Protonix for 6 months 40 mg #30 one a day with 6 refills  Dr. Bob  "

## 2017-12-28 NOTE — TELEPHONE ENCOUNTER
Called patient back. Informed her that Dr Bob agreed to fill medication for 6 months. No additional refills until patient comes back in for follow up with him or Tatiana. Patient voiced understanding.

## 2018-07-25 ENCOUNTER — TRANSCRIBE ORDERS (OUTPATIENT)
Dept: ADMINISTRATIVE | Facility: HOSPITAL | Age: 58
End: 2018-07-25

## 2018-07-25 DIAGNOSIS — R92.8 ABNORMAL MAMMOGRAM: Primary | ICD-10-CM

## 2018-08-01 ENCOUNTER — HOSPITAL ENCOUNTER (OUTPATIENT)
Dept: ULTRASOUND IMAGING | Facility: HOSPITAL | Age: 58
Discharge: HOME | End: 2018-08-01

## 2018-08-01 ENCOUNTER — HOSPITAL ENCOUNTER (OUTPATIENT)
Dept: MAMMOGRAPHY | Facility: HOSPITAL | Age: 58
Discharge: HOME | End: 2018-08-01
Attending: OBSTETRICS & GYNECOLOGY | Admitting: OBSTETRICS & GYNECOLOGY

## 2018-08-01 DIAGNOSIS — R92.8 ABNORMAL MAMMOGRAM: ICD-10-CM

## 2018-08-01 PROCEDURE — G0279 TOMOSYNTHESIS, MAMMO: HCPCS

## 2018-08-01 PROCEDURE — 77066 DX MAMMO INCL CAD BI: CPT | Performed by: RADIOLOGY

## 2018-08-01 PROCEDURE — 76642 ULTRASOUND BREAST LIMITED: CPT | Performed by: RADIOLOGY

## 2018-08-01 PROCEDURE — 77062 BREAST TOMOSYNTHESIS BI: CPT | Performed by: RADIOLOGY

## 2018-08-01 PROCEDURE — 76642 ULTRASOUND BREAST LIMITED: CPT

## 2018-08-01 PROCEDURE — 77066 DX MAMMO INCL CAD BI: CPT

## 2018-08-27 ENCOUNTER — OFFICE VISIT (OUTPATIENT)
Dept: CARDIOLOGY | Facility: CLINIC | Age: 58
End: 2018-08-27

## 2018-08-27 VITALS
BODY MASS INDEX: 31.1 KG/M2 | HEART RATE: 57 BPM | OXYGEN SATURATION: 99 % | DIASTOLIC BLOOD PRESSURE: 88 MMHG | HEIGHT: 62 IN | WEIGHT: 169 LBS | SYSTOLIC BLOOD PRESSURE: 150 MMHG

## 2018-08-27 DIAGNOSIS — I10 ESSENTIAL HYPERTENSION: ICD-10-CM

## 2018-08-27 DIAGNOSIS — I70.0 ATHEROSCLEROSIS OF AORTA (HCC): ICD-10-CM

## 2018-08-27 DIAGNOSIS — E78.2 MIXED HYPERLIPIDEMIA: Primary | ICD-10-CM

## 2018-08-27 PROCEDURE — 99213 OFFICE O/P EST LOW 20 MIN: CPT | Performed by: INTERNAL MEDICINE

## 2018-08-27 RX ORDER — LOSARTAN POTASSIUM AND HYDROCHLOROTHIAZIDE 12.5; 1 MG/1; MG/1
1 TABLET ORAL DAILY
Qty: 30 TABLET | Refills: 5 | Status: SHIPPED | OUTPATIENT
Start: 2018-08-27 | End: 2019-03-06 | Stop reason: SDUPTHER

## 2018-08-27 NOTE — PROGRESS NOTES
[facility] at [facility]  Office visit  Bianka Fisher  1960  [phone]    VISIT DATE:  09/06/2017    PCP: Cierra Adamson MD  [address] [place] [id number]    CC:  No chief complaint on file.      ASSESSMENT:   Diagnosis Plan   1. Mixed hyperlipidemia     2. Essential hypertension         PLAN:  Continue atorvastatin, goal LDL less than 100  Repeat fasting lipid panel pending.  Continue propranolol(predominantly being used for essential tremor)  Switching losartan 50 mg by mouth daily toe combination losartan 100 mg and hydrochlorothiazide 12.5 mg, goal blood pressure less than 130/80  Repeat BMP in 3-4 weeks, follow-up in 6-8 weeks    Subjective  Systolic blood pressures are still running in the 140-160 mmHg range.  Currently denies chest pain, dyspnea or palpitations.  She appears compliant with medical therapy.  Denies myalgias on statin.  Has not had a chance to have her lipid profile repeated.    PHYSICAL EXAMINATION:  There were no vitals filed for this visit.  General Appearance:    Alert, cooperative, no distress, appears stated age   Head:    Normocephalic, without obvious abnormality, atraumatic   Eyes:    conjunctiva/corneas clear   Nose:   Nares normal, septum midline, mucosa normal, no drainage   Throat:   Lips, teeth and gums normal   Neck:   Supple, symmetrical, trachea midline, no carotid    bruit or JVD   Lungs:     Clear to auscultation bilaterally, respirations unlabored   Chest Wall:    No tenderness or deformity    Heart:    Regular rate and rhythm, S1 and S2 normal, no murmur, rub   or gallop, normal carotid impulse bilaterally without bruit.   Abdomen:     Soft, non-tender   Extremities:   Extremities normal, atraumatic, no cyanosis or edema   Pulses:   2+ and symmetric all extremities   Skin:   Skin color, texture, turgor normal, no rashes or lesions       Diagnostic Data:  Procedures  Lab Results   Component Value Date    TRIG 113 12/18/2017    HDL  42 12/18/2017     Lab Results   Component Value Date    GLUCOSE 103 (H) 12/18/2017    BUN 11 12/18/2017    CREATININE 1.00 12/18/2017     12/18/2017    K 3.8 12/18/2017     12/18/2017    CO2 28.0 12/18/2017     No results found for: HGBA1C  No results found for: WBC, HGB, HCT, PLT    Allergies  No Known Allergies    Current Medications    Current Outpatient Prescriptions:   •  atorvastatin (LIPITOR) 40 MG tablet, Take 1 tablet by mouth Daily., Disp: 90 tablet, Rfl: 1  •  cetirizine (zyrTEC) 10 MG tablet, Take 10 mg by mouth Daily., Disp: , Rfl:   •  losartan (COZAAR) 50 MG tablet, Take 1 tablet by mouth Daily., Disp: 90 tablet, Rfl: 1  •  pantoprazole (PROTONIX) 40 MG EC tablet, Take 1 tablet by mouth Daily. Take first thing in the morning on an empty stomach.  Wait at least 30 min to 1hr before eating., Disp: 30 tablet, Rfl: 5  •  propranolol (INDERAL) 60 MG tablet, Take 60 mg by mouth Daily., Disp: , Rfl:   •  traZODone (DESYREL) 150 MG tablet, Take 150 mg by mouth Every Night., Disp: , Rfl:           ROS  Review of Systems   Cardiovascular: Negative for chest pain, dyspnea on exertion, leg swelling and palpitations.   Respiratory: Positive for cough. Negative for wheezing.    Neurological: Positive for headaches and tremors.   Allergic/Immunologic: Positive for environmental allergies.       SOCIAL HX  Social History     Social History   • Marital status:      Spouse name: N/A   • Number of children: N/A   • Years of education: N/A     Occupational History   • Not on file.     Social History Main Topics   • Smoking status: Never Smoker   • Smokeless tobacco: Never Used   • Alcohol use No   • Drug use: No      Comment: quit when pt was in 20's   • Sexual activity: Defer     Other Topics Concern   • Not on file     Social History Narrative   • No narrative on file       FAMILY HX  Family History   Problem Relation Age of Onset   • Lung cancer Mother    • Skin cancer Sister    • Breast cancer Neg  Hx    • Endometrial cancer Neg Hx    • Ovarian cancer Neg Hx              Valentino Bates III, MD, FACC

## 2018-08-29 RX ORDER — PROPRANOLOL HYDROCHLORIDE 60 MG/1
60 TABLET ORAL DAILY
Qty: 30 TABLET | Refills: 5 | Status: SHIPPED | OUTPATIENT
Start: 2018-08-29 | End: 2019-06-28 | Stop reason: SDUPTHER

## 2018-10-04 ENCOUNTER — APPOINTMENT (OUTPATIENT)
Dept: MAMMOGRAPHY | Facility: HOSPITAL | Age: 58
End: 2018-10-04
Attending: OBSTETRICS & GYNECOLOGY

## 2018-10-26 ENCOUNTER — LAB (OUTPATIENT)
Dept: LAB | Facility: HOSPITAL | Age: 58
End: 2018-10-26

## 2018-10-26 DIAGNOSIS — I10 ESSENTIAL HYPERTENSION: ICD-10-CM

## 2018-10-26 LAB
ALBUMIN SERPL-MCNC: 4.25 G/DL (ref 3.2–4.8)
ALBUMIN/GLOB SERPL: 2.4 G/DL (ref 1.5–2.5)
ALP SERPL-CCNC: 86 U/L (ref 25–100)
ALT SERPL W P-5'-P-CCNC: 20 U/L (ref 7–40)
ANION GAP SERPL CALCULATED.3IONS-SCNC: 6 MMOL/L (ref 3–11)
ARTICHOKE IGE QN: 121 MG/DL (ref 0–130)
AST SERPL-CCNC: 24 U/L (ref 0–33)
BILIRUB SERPL-MCNC: 0.8 MG/DL (ref 0.3–1.2)
BUN BLD-MCNC: 10 MG/DL (ref 9–23)
BUN/CREAT SERPL: 9.9 (ref 7–25)
CALCIUM SPEC-SCNC: 9.3 MG/DL (ref 8.7–10.4)
CHLORIDE SERPL-SCNC: 104 MMOL/L (ref 99–109)
CHOLEST SERPL-MCNC: 180 MG/DL (ref 0–200)
CO2 SERPL-SCNC: 29 MMOL/L (ref 20–31)
CREAT BLD-MCNC: 1.01 MG/DL (ref 0.6–1.3)
GFR SERPL CREATININE-BSD FRML MDRD: 56 ML/MIN/1.73
GLOBULIN UR ELPH-MCNC: 1.8 GM/DL
GLUCOSE BLD-MCNC: 91 MG/DL (ref 70–100)
HDLC SERPL-MCNC: 49 MG/DL (ref 40–60)
POTASSIUM BLD-SCNC: 3.7 MMOL/L (ref 3.5–5.5)
PROT SERPL-MCNC: 6 G/DL (ref 5.7–8.2)
SODIUM BLD-SCNC: 139 MMOL/L (ref 132–146)
TRIGL SERPL-MCNC: 91 MG/DL (ref 0–150)

## 2018-10-26 PROCEDURE — 80061 LIPID PANEL: CPT

## 2018-10-26 PROCEDURE — 80053 COMPREHEN METABOLIC PANEL: CPT

## 2018-10-26 PROCEDURE — 36415 COLL VENOUS BLD VENIPUNCTURE: CPT

## 2018-10-29 ENCOUNTER — OFFICE VISIT (OUTPATIENT)
Dept: CARDIOLOGY | Facility: CLINIC | Age: 58
End: 2018-10-29

## 2018-10-29 VITALS
BODY MASS INDEX: 31.28 KG/M2 | WEIGHT: 170 LBS | HEIGHT: 62 IN | SYSTOLIC BLOOD PRESSURE: 108 MMHG | HEART RATE: 73 BPM | DIASTOLIC BLOOD PRESSURE: 60 MMHG

## 2018-10-29 DIAGNOSIS — I10 ESSENTIAL HYPERTENSION: Primary | ICD-10-CM

## 2018-10-29 DIAGNOSIS — I70.0 ATHEROSCLEROSIS OF AORTA (HCC): ICD-10-CM

## 2018-10-29 DIAGNOSIS — E78.2 MIXED HYPERLIPIDEMIA: ICD-10-CM

## 2018-10-29 PROCEDURE — 99213 OFFICE O/P EST LOW 20 MIN: CPT | Performed by: INTERNAL MEDICINE

## 2018-10-29 RX ORDER — ROSUVASTATIN CALCIUM 40 MG/1
40 TABLET, COATED ORAL DAILY
Qty: 30 TABLET | Refills: 5 | Status: SHIPPED | OUTPATIENT
Start: 2018-10-29 | End: 2019-02-01

## 2018-10-29 NOTE — PROGRESS NOTES
[facility] at [facility]  Office visit  Bianka Fisher  1960  [phone]    VISIT DATE:  09/06/2017    PCP: Provider, No Known  [place] [id number]    CC:  No chief complaint on file.      ASSESSMENT:   Diagnosis Plan   1. Essential hypertension     2. Mixed hyperlipidemia     3. Atherosclerosis of aorta (CMS/HCC)         PLAN:  Switching atorvastatin 40 mg by mouth daily to rosuvastatin 40 mg by mouth daily, goal LDL less than 100  Continue propranolol(predominantly being used for essential tremor)  Continue losartan 100 mg -hydrochlorothiazide 12.5 mg, goal blood pressure less than 130/80      Subjective  Blood pressures probably running less than 140/90 mmHg..  Currently denies chest pain, dyspnea or palpitations.  She appears compliant with medical therapy.  Denies myalgias on statin.  Reviewed most recent fasting lipid panel.  Intermittent dizziness which appears to be positional in nature.  Intermittent bitemporal headaches over the previous month, arranging for initial evaluation and subsequent follow-up with primary care physician.    PHYSICAL EXAMINATION:  There were no vitals filed for this visit.  General Appearance:    Alert, cooperative, no distress, appears stated age   Head:    Normocephalic, without obvious abnormality, atraumatic   Eyes:    conjunctiva/corneas clear   Nose:   Nares normal, septum midline, mucosa normal, no drainage   Throat:   Lips, teeth and gums normal   Neck:   Supple, symmetrical, trachea midline, no carotid    bruit or JVD   Lungs:     Clear to auscultation bilaterally, respirations unlabored   Chest Wall:    No tenderness or deformity    Heart:    Regular rate and rhythm, S1 and S2 normal, no murmur, rub   or gallop, normal carotid impulse bilaterally without bruit.   Abdomen:     Soft, non-tender   Extremities:   Extremities normal, atraumatic, no cyanosis or edema   Pulses:   2+ and symmetric all extremities   Skin:   Skin color,  texture, turgor normal, no rashes or lesions       Diagnostic Data:  Procedures  Lab Results   Component Value Date    TRIG 91 10/26/2018    HDL 49 10/26/2018     Lab Results   Component Value Date    GLUCOSE 91 10/26/2018    BUN 10 10/26/2018    CREATININE 1.01 10/26/2018     10/26/2018    K 3.7 10/26/2018     10/26/2018    CO2 29.0 10/26/2018     No results found for: HGBA1C  No results found for: WBC, HGB, HCT, PLT    Allergies  No Known Allergies    Current Medications    Current Outpatient Prescriptions:   •  atorvastatin (LIPITOR) 40 MG tablet, Take 1 tablet by mouth Daily., Disp: 90 tablet, Rfl: 1  •  cetirizine (zyrTEC) 10 MG tablet, Take 10 mg by mouth Daily., Disp: , Rfl:   •  losartan-hydrochlorothiazide (HYZAAR) 100-12.5 MG per tablet, Take 1 tablet by mouth Daily., Disp: 30 tablet, Rfl: 5  •  pantoprazole (PROTONIX) 40 MG EC tablet, Take 1 tablet by mouth Daily. Take first thing in the morning on an empty stomach.  Wait at least 30 min to 1hr before eating., Disp: 30 tablet, Rfl: 5  •  propranolol (INDERAL) 60 MG tablet, Take 1 tablet by mouth Daily., Disp: 30 tablet, Rfl: 5  •  traZODone (DESYREL) 150 MG tablet, Take 150 mg by mouth Every Night., Disp: , Rfl:           ROS  Review of Systems   Cardiovascular: Negative for chest pain, dyspnea on exertion, leg swelling and palpitations.   Respiratory: Positive for cough, shortness of breath and snoring. Negative for wheezing.    Neurological: Positive for dizziness.   Allergic/Immunologic: Positive for environmental allergies.       SOCIAL HX  Social History     Social History   • Marital status:      Spouse name: N/A   • Number of children: N/A   • Years of education: N/A     Occupational History   • Not on file.     Social History Main Topics   • Smoking status: Never Smoker   • Smokeless tobacco: Never Used   • Alcohol use No   • Drug use: No      Comment: quit when pt was in 20's   • Sexual activity: Defer     Other Topics Concern    • Not on file     Social History Narrative   • No narrative on file       FAMILY HX  Family History   Problem Relation Age of Onset   • Lung cancer Mother    • Skin cancer Sister    • Breast cancer Neg Hx    • Endometrial cancer Neg Hx    • Ovarian cancer Neg Hx              Valentino Bates III, MD, FACC

## 2018-11-12 ENCOUNTER — OFFICE VISIT (OUTPATIENT)
Dept: FAMILY MEDICINE CLINIC | Facility: CLINIC | Age: 58
End: 2018-11-12

## 2018-11-12 ENCOUNTER — LAB (OUTPATIENT)
Dept: LAB | Facility: HOSPITAL | Age: 58
End: 2018-11-12

## 2018-11-12 VITALS
HEIGHT: 62 IN | WEIGHT: 172 LBS | DIASTOLIC BLOOD PRESSURE: 70 MMHG | HEART RATE: 70 BPM | BODY MASS INDEX: 31.65 KG/M2 | OXYGEN SATURATION: 96 % | SYSTOLIC BLOOD PRESSURE: 110 MMHG

## 2018-11-12 DIAGNOSIS — R53.83 FATIGUE, UNSPECIFIED TYPE: ICD-10-CM

## 2018-11-12 DIAGNOSIS — Z82.0 FAMILY HISTORY OF ALZHEIMER'S DISEASE: ICD-10-CM

## 2018-11-12 DIAGNOSIS — I10 ESSENTIAL HYPERTENSION: Primary | ICD-10-CM

## 2018-11-12 DIAGNOSIS — G25.0 TREMOR, ESSENTIAL: ICD-10-CM

## 2018-11-12 DIAGNOSIS — R41.3 MEMORY DISTURBANCE: ICD-10-CM

## 2018-11-12 DIAGNOSIS — E78.2 MIXED HYPERLIPIDEMIA: ICD-10-CM

## 2018-11-12 DIAGNOSIS — K21.9 GASTROESOPHAGEAL REFLUX DISEASE WITHOUT ESOPHAGITIS: ICD-10-CM

## 2018-11-12 DIAGNOSIS — E66.09 CLASS 1 OBESITY DUE TO EXCESS CALORIES WITH SERIOUS COMORBIDITY AND BODY MASS INDEX (BMI) OF 31.0 TO 31.9 IN ADULT: ICD-10-CM

## 2018-11-12 DIAGNOSIS — I10 ESSENTIAL HYPERTENSION: ICD-10-CM

## 2018-11-12 LAB
25(OH)D3 SERPL-MCNC: 25.2 NG/ML
BASOPHILS # BLD AUTO: 0.06 10*3/MM3 (ref 0–0.2)
BASOPHILS NFR BLD AUTO: 0.7 % (ref 0–1)
DEPRECATED RDW RBC AUTO: 46 FL (ref 37–54)
EOSINOPHIL # BLD AUTO: 0.23 10*3/MM3 (ref 0–0.3)
EOSINOPHIL NFR BLD AUTO: 2.6 % (ref 0–3)
ERYTHROCYTE [DISTWIDTH] IN BLOOD BY AUTOMATED COUNT: 14.2 % (ref 11.3–14.5)
FSH SERPL-ACNC: 146 MIU/ML
HBA1C MFR BLD: 5.9 % (ref 4.8–5.6)
HCT VFR BLD AUTO: 38.6 % (ref 34.5–44)
HGB BLD-MCNC: 12.2 G/DL (ref 11.5–15.5)
IMM GRANULOCYTES # BLD: 0.02 10*3/MM3 (ref 0–0.03)
IMM GRANULOCYTES NFR BLD: 0.2 % (ref 0–0.6)
LH SERPL-ACNC: 57.8 MIU/ML
LYMPHOCYTES # BLD AUTO: 2.24 10*3/MM3 (ref 0.6–4.8)
LYMPHOCYTES NFR BLD AUTO: 25 % (ref 24–44)
MCH RBC QN AUTO: 27.9 PG (ref 27–31)
MCHC RBC AUTO-ENTMCNC: 31.6 G/DL (ref 32–36)
MCV RBC AUTO: 88.1 FL (ref 80–99)
MONOCYTES # BLD AUTO: 0.72 10*3/MM3 (ref 0–1)
MONOCYTES NFR BLD AUTO: 8 % (ref 0–12)
NEUTROPHILS # BLD AUTO: 5.68 10*3/MM3 (ref 1.5–8.3)
NEUTROPHILS NFR BLD AUTO: 63.5 % (ref 41–71)
PLATELET # BLD AUTO: 187 10*3/MM3 (ref 150–450)
PMV BLD AUTO: 11.7 FL (ref 6–12)
RBC # BLD AUTO: 4.38 10*6/MM3 (ref 3.89–5.14)
T4 FREE SERPL-MCNC: 1.16 NG/DL (ref 0.89–1.76)
TSH SERPL DL<=0.05 MIU/L-ACNC: 1.25 MIU/ML (ref 0.35–5.35)
VIT B12 BLD-MCNC: 433 PG/ML (ref 211–911)
WBC NRBC COR # BLD: 8.95 10*3/MM3 (ref 3.5–10.8)

## 2018-11-12 PROCEDURE — 82607 VITAMIN B-12: CPT | Performed by: FAMILY MEDICINE

## 2018-11-12 PROCEDURE — 83001 ASSAY OF GONADOTROPIN (FSH): CPT | Performed by: FAMILY MEDICINE

## 2018-11-12 PROCEDURE — 85025 COMPLETE CBC W/AUTO DIFF WBC: CPT | Performed by: FAMILY MEDICINE

## 2018-11-12 PROCEDURE — 99204 OFFICE O/P NEW MOD 45 MIN: CPT | Performed by: FAMILY MEDICINE

## 2018-11-12 PROCEDURE — 36415 COLL VENOUS BLD VENIPUNCTURE: CPT

## 2018-11-12 PROCEDURE — 83002 ASSAY OF GONADOTROPIN (LH): CPT | Performed by: FAMILY MEDICINE

## 2018-11-12 PROCEDURE — 82306 VITAMIN D 25 HYDROXY: CPT | Performed by: FAMILY MEDICINE

## 2018-11-12 PROCEDURE — 84443 ASSAY THYROID STIM HORMONE: CPT | Performed by: FAMILY MEDICINE

## 2018-11-12 PROCEDURE — 84439 ASSAY OF FREE THYROXINE: CPT | Performed by: FAMILY MEDICINE

## 2018-11-12 PROCEDURE — 83036 HEMOGLOBIN GLYCOSYLATED A1C: CPT | Performed by: FAMILY MEDICINE

## 2018-11-12 RX ORDER — PANTOPRAZOLE SODIUM 20 MG/1
20 TABLET, DELAYED RELEASE ORAL DAILY
Qty: 30 TABLET | Refills: 2 | Status: SHIPPED | OUTPATIENT
Start: 2018-11-12 | End: 2019-01-10 | Stop reason: SDUPTHER

## 2018-11-12 NOTE — PATIENT INSTRUCTIONS
1.  Continue same medications and supplements - as listed.  · Start Crestor (rosuvastatin) 40mg  · Repeat cholesterol panel in 3 months  · Consider B12 supplement daily. Check the label for percentages of vitamins included.    2.  Continue well-balanced diet - low in salt and low in sugar.    3.  Maintain a routine exercise program - every week.    4.  A referral has been placed to neurology regarding tremor and memory impairment.

## 2018-11-12 NOTE — PROGRESS NOTES
Subjective   Bianka Fisher is a 58 y.o. female.     Chief Complaint   Patient presents with   • Establish Care       History of Present Illness     Previous primary care: None    Chronic health conditions:  HLD: Has previously been atorvastatin 40mg, recently changed to rosuvastatin 40mg due to LDL persistently elevated  HTN: Well-controlled on losartan-HCTZ 100-12.5mg, has only been on it a couple months. Had previously been on losartan alone.  Essential tremors: Maintained on propranolol 60mg for many years (has had since her early 30s).  Aortic atherosclerosis: Maintained on Crestor 40mg for secondary prevention, not on aspirin  Dyssomnia: Stable on trazodone 150mg for many years  GERD: Has been stable on pantoprazole for many years. Has had a scope in the past, unsure of when last time.    Other physicians currently involved in patient's care:  Has seen [facility] in the past  Cardiologist Dr. Bates: Sees every 6 months    Acute complaints:  Bianka Fisher is a 58 y.o. female who presents today to establish care. She rports worsening of memory over the past few years. She notes decreased short-term and long-term memory. Loses train of though frequently.    The following portions of the patient's history were reviewed and updated as appropriate: allergies, current medications, past family history, past medical history, past social history, past surgical history and problem list.    Active Ambulatory Problems     Diagnosis Date Noted   • Precordial pain 08/02/2017   • Mixed hyperlipidemia 08/02/2017   • Claudication (CMS/[facility]) 08/02/2017   • Atherosclerosis of aorta (CMS/[facility]) 08/02/2017   • Essential hypertension 09/06/2017   • GERD (gastroesophageal reflux disease) 11/12/2018   • Tremor, essential 11/12/2018     Resolved Ambulatory Problems     Diagnosis Date Noted   • No Resolved Ambulatory Problems     Past Medical History:   Diagnosis Date   • Anxiety    • Asthma    • GERD (gastroesophageal reflux disease)     • Heart murmur    • Hyperlipidemia    • Insomnia    • Skin cancer      Past Surgical History:   Procedure Laterality Date   • BREAST BIOPSY Right 2017   •  SECTION     • CHOLECYSTECTOMY     • COLONOSCOPY     • TONSILLECTOMY     • TONSILLECTOMY       Family History   Problem Relation Age of Onset   • Lung cancer Mother    • Skin cancer Sister    • Breast cancer Neg Hx    • Endometrial cancer Neg Hx    • Ovarian cancer Neg Hx      Social History     Socioeconomic History   • Marital status:      Spouse name: Not on file   • Number of children: Not on file   • Years of education: Not on file   • Highest education level: Not on file   Social Needs   • Financial resource strain: Not on file   • Food insecurity - worry: Not on file   • Food insecurity - inability: Not on file   • Transportation needs - medical: Not on file   • Transportation needs - non-medical: Not on file   Occupational History   • Not on file   Tobacco Use   • Smoking status: Never Smoker   • Smokeless tobacco: Never Used   Substance and Sexual Activity   • Alcohol use: No   • Drug use: No     Comment: quit when pt was in 20's   • Sexual activity: Defer   Other Topics Concern   • Not on file   Social History Narrative   • Not on file         Review of Systems   Constitutional: Negative.    HENT: Negative.    Eyes: Negative.    Respiratory: Negative for cough, chest tightness, shortness of breath and wheezing.    Cardiovascular: Negative for chest pain and palpitations.   Gastrointestinal: Negative for abdominal distention, abdominal pain, constipation, diarrhea, nausea and vomiting.   Musculoskeletal: Negative for gait problem and joint swelling.   Skin: Negative for color change and wound.   Allergic/Immunologic: Negative.    Neurological: Positive for tremors. Negative for headaches.   Psychiatric/Behavioral: Negative for agitation and hallucinations.        Sleep difficulty improved on trazodone; Memory impairment  "      Objective   Blood pressure 110/70, pulse 70, height 157.5 cm (62.01\"), weight 78 kg (172 lb), last menstrual period 02/01/2016, SpO2 96 %.  Nursing note reviewed  Physical Exam  Const: NAD, A&Ox4, Pleasant, Cooperative  Eyes: EOMI, no conjunctivitis  ENT: No nasal discharge present, neck supple  Cardiac: Regular rate and rhythm, no cyanosis  Resp: Respiratory rate within normal limits, no increased work of breathing, no audible wheezing or retractions noted  GI: No distention or ascites  MSK: Motor and sensation grossly intact in bilateral upper extremities  Neurologic: CN II-XII grossly intact  Psych: Appropriate mood and behavior.  Skin: Pink, warm, dry  Procedures  Assessment/Plan   Bianka was seen today for establish care.    Diagnoses and all orders for this visit:    Essential hypertension  -     Hemoglobin A1c; Future    Mixed hyperlipidemia    Gastroesophageal reflux disease without esophagitis  -     pantoprazole (PROTONIX) 20 MG EC tablet; Take 1 tablet by mouth Daily.  -     CBC w AUTO Differential; Future    Tremor, essential  -     Ambulatory Referral to Neurology    Memory disturbance  Comments:  DIfficulty with both short- and long-term memory, word-finding. Has been on propranolol for tremor since 30s, memory impairment has worsened over the past years  Orders:  -     Ambulatory Referral to Neurology  -     Vitamin B12; Future  -     Vitamin D 25 hydroxy; Future    Family history of Alzheimer's disease  -     Ambulatory Referral to Neurology    Class 1 obesity due to excess calories with serious comorbidity and body mass index (BMI) of 31.0 to 31.9 in adult  -     Hemoglobin A1c; Future  -     TSH; Future  -     T4, free; Future    Fatigue, unspecified type  -     CBC w AUTO Differential; Future  -     Vitamin B12; Future  -     Vitamin D 25 hydroxy; Future  -     Hemoglobin A1c; Future  -     TSH; Future  -     T4, free; Future  -     Follicle stimulating hormone; Future  -     Luteinizing " "hormone; Future        Acute concerns:  #1 Memory impairment  She also endorses decreased motivation and drive. FH of Alzheimer's, concerned about neuro symptoms especially with propranolol.  - I have encouraged her to try to decrease propranolol but not stop entirely abruptly.  - Referral has been placed for neuro.  - Labs including menopausal, fatigue, anemia labs.  - Consider B12 supplementation    Chronic health conditions:  #1 hypertension  Stable, at goal. Continue meds.    #2 hyperlipidemia  She should start taking rosuvastatin 40mg for secondary prevention. Consider ASA.    #3 atherosclerosis of aorta  Stable per cardiology    #4 Tremor  Referred to neurology. Appears benign essential, well-compensated on propranolol.    #5 GERD  Continue pantoprazole, try 20mg dose instead of 40mg    Chronic issues not specifically addressed today:  · Obesity: May be a candidate for Saxenda. She states she \"likes being thick\"    We will plan to obtain previous records both for chronic preventative care as well as those related to the current episode of care.  Any records that the patient brought with her today were reviewed personally by me during the visit today and will be scanned into the chart for posterity.    Patient Instructions   1.  Continue same medications and supplements - as listed.  · Start Crestor (rosuvastatin) 40mg  · Repeat cholesterol panel in 3 months  · Consider B12 supplement daily. Check the label for percentages of vitamins included.    2.  Continue well-balanced diet - low in salt and low in sugar.    3.  Maintain a routine exercise program - every week.    4.  A referral has been placed to neurology regarding tremor and memory impairment.      Return in about 3 months (around 2/12/2019) for (fasting blood work 1 week prior to appt).    Ambulatory progress note signed and attested to by David Mckeon D.O. on 11/12/2018 at 11:15.           "

## 2018-12-12 ENCOUNTER — OFFICE VISIT (OUTPATIENT)
Dept: NEUROLOGY | Facility: CLINIC | Age: 58
End: 2018-12-12

## 2018-12-12 VITALS
HEIGHT: 62 IN | WEIGHT: 172 LBS | SYSTOLIC BLOOD PRESSURE: 106 MMHG | BODY MASS INDEX: 31.65 KG/M2 | DIASTOLIC BLOOD PRESSURE: 69 MMHG

## 2018-12-12 DIAGNOSIS — R41.3 MEMORY LOSS: Primary | ICD-10-CM

## 2018-12-12 PROCEDURE — 99244 OFF/OP CNSLTJ NEW/EST MOD 40: CPT | Performed by: PSYCHIATRY & NEUROLOGY

## 2018-12-12 NOTE — PROGRESS NOTES
"Subjective     Bianka Fisher is seen today in consultation at the request of Dr. Mckeon for memory impairment.    CC: memory loss    History of Present Illness   Bianka Fisher is a 58 y.o. female who comes to clinic today for evaluation of memory impairment. She has noted progressive memory impairment, impacting both recent and remote memory since about 2012. She notes difficulty retaining information. She notes associated impairments in orientation, language and executive function. There are no exacerbating or alleviating factors.    She does endorse significant depression which she relates to her cognitive impairment. She does endorse non-refreshing sleep and daytime fatigue.    Prior evaluation has included screening blood work  which were unremarkable .     I have reviewed and confirmed the past family, social and medical history as accurate on 12/12/18.     Review of Systems   Constitutional: Negative.    Respiratory: Negative.    Cardiovascular: Negative.    Gastrointestinal: Positive for constipation, diarrhea and nausea.   Genitourinary: Negative.    Psychiatric/Behavioral: Positive for dysphoric mood.       Objective   General appearance today is normal.   Peripheral pulses were present and symmetric.   The ophthalmoscopic exam today is unremarkable. The discs and posterior elements are unremarkable.    /69   Ht 157.5 cm (62.01\")   Wt 78 kg (172 lb)   LMP 02/01/2016   BMI 31.45 kg/m²     Physical Exam   Constitutional: She is oriented to person, place, and time.   Neurological: She is oriented to person, place, and time. She has normal strength. She has a normal Finger-Nose-Finger Test. Gait normal.   Reflex Scores:       Tricep reflexes are 2+ on the right side and 2+ on the left side.       Bicep reflexes are 2+ on the right side and 2+ on the left side.       Brachioradialis reflexes are 2+ on the right side and 2+ on the left side.  Psychiatric: Her speech is normal.        Neurologic Exam "     Mental Status   Oriented to person, place, and time.   Registration: recalls 3 of 3 objects. Recall at 5 minutes: recalls 2 of 3 objects. Follows 3 step commands.   Attention: normal. Concentration: normal.   Speech: speech is normal   Level of consciousness: alert  Knowledge: good.   Able to name object. Able to read. Able to repeat. Able to write. Normal comprehension.     Cranial Nerves   Cranial nerves II through XII intact.     Motor Exam   Muscle bulk: normal  Overall muscle tone: normal    Strength   Strength 5/5 throughout.     Sensory Exam   Light touch normal.     Gait, Coordination, and Reflexes     Gait  Gait: normal    Coordination   Finger to nose coordination: normal    Tremor   Intention tremor: present (mild postural and intention tremor)    Reflexes   Right brachioradialis: 2+  Left brachioradialis: 2+  Right biceps: 2+  Left biceps: 2+  Right triceps: 2+  Left triceps: 2+      MMSE=29  MoCA=28 (3/5 free recall, 5/5 cued recall)      Assessment/Plan   Bianka was seen today for tremors and memory loss.    Diagnoses and all orders for this visit:    Memory loss  -     MRI Brain Without Contrast          DISCUSSION/SUMMARY    Bianka Fisher comes to clinic today for evaluation of memory loss. At this time I don't find clear evidence of a neurodegenerative disorder. Rather, I suspect her symptoms could be related to other factors such as depression, stress, sleep disorder, etc. For now, I'll check an MRI. If this is normal, I would consider evaluation for possible depression and/or a sleep disorder.    As part of this visit I reviewed prior lab results. Please see above for additional details.

## 2018-12-22 ENCOUNTER — HOSPITAL ENCOUNTER (OUTPATIENT)
Dept: MRI IMAGING | Facility: HOSPITAL | Age: 58
Discharge: HOME | End: 2018-12-22
Attending: PSYCHIATRY & NEUROLOGY | Admitting: PSYCHIATRY & NEUROLOGY

## 2018-12-22 PROCEDURE — 70551 MRI BRAIN STEM W/O DYE: CPT

## 2019-01-10 DIAGNOSIS — K21.9 GASTROESOPHAGEAL REFLUX DISEASE WITHOUT ESOPHAGITIS: ICD-10-CM

## 2019-01-10 RX ORDER — PANTOPRAZOLE SODIUM 20 MG/1
20 TABLET, DELAYED RELEASE ORAL DAILY
Qty: 90 TABLET | Refills: 0 | Status: SHIPPED | OUTPATIENT
Start: 2019-01-10 | End: 2019-05-17 | Stop reason: SDUPTHER

## 2019-01-31 ENCOUNTER — TELEPHONE (OUTPATIENT)
Dept: FAMILY MEDICINE CLINIC | Facility: CLINIC | Age: 59
End: 2019-01-31

## 2019-01-31 DIAGNOSIS — E78.2 MIXED HYPERLIPIDEMIA: Primary | ICD-10-CM

## 2019-02-01 RX ORDER — ATORVASTATIN CALCIUM 40 MG/1
40 TABLET, FILM COATED ORAL DAILY
Qty: 30 TABLET | Refills: 5 | Status: SHIPPED | OUTPATIENT
Start: 2019-02-01 | End: 2019-02-06

## 2019-02-13 ENCOUNTER — OFFICE VISIT (OUTPATIENT)
Dept: FAMILY MEDICINE CLINIC | Facility: CLINIC | Age: 59
End: 2019-02-13

## 2019-02-13 VITALS
HEART RATE: 62 BPM | SYSTOLIC BLOOD PRESSURE: 110 MMHG | RESPIRATION RATE: 18 BRPM | TEMPERATURE: 98.1 F | WEIGHT: 171 LBS | BODY MASS INDEX: 31.27 KG/M2 | OXYGEN SATURATION: 95 % | DIASTOLIC BLOOD PRESSURE: 68 MMHG

## 2019-02-13 DIAGNOSIS — E78.2 MIXED HYPERLIPIDEMIA: Primary | ICD-10-CM

## 2019-02-13 DIAGNOSIS — I10 ESSENTIAL HYPERTENSION: ICD-10-CM

## 2019-02-13 DIAGNOSIS — E66.09 CLASS 1 OBESITY DUE TO EXCESS CALORIES WITH SERIOUS COMORBIDITY AND BODY MASS INDEX (BMI) OF 31.0 TO 31.9 IN ADULT: ICD-10-CM

## 2019-02-13 DIAGNOSIS — K21.9 GASTROESOPHAGEAL REFLUX DISEASE WITHOUT ESOPHAGITIS: ICD-10-CM

## 2019-02-13 PROCEDURE — 99214 OFFICE O/P EST MOD 30 MIN: CPT | Performed by: FAMILY MEDICINE

## 2019-02-13 RX ORDER — PHENTERMINE HYDROCHLORIDE 15 MG/1
15 CAPSULE ORAL EVERY MORNING
Qty: 30 CAPSULE | Refills: 2 | Status: SHIPPED | OUTPATIENT
Start: 2019-02-13 | End: 2019-03-23

## 2019-02-13 NOTE — PROGRESS NOTES
Subjective   Bianka Fisher is a 59 y.o. female.     Chief Complaint   Patient presents with   • Follow-up       History of Present Illness     Chronic health conditions:  HLD: Has previously been atorvastatin 40mg, recently changed to rosuvastatin 40mg due to LDL persistently elevated. Did not tolerate this due to hives, sent in pitavastatin. She was unable to pick this up, cost, and insurance would not cover.  HTN: Well-controlled on losartan-HCTZ 100-12.5mg, has only been on it a couple months. Had previously been on losartan alone.  Essential tremors: Maintained on propranolol 60mg for many years (has had since her early 30s).  Aortic atherosclerosis: Not on aspirin or a statin  Dyssomnia: Stable on trazodone 150mg for many years  GERD: Has been stable on pantoprazole for many years. Has had a scope in the past, unsure of when last time.    Other physicians currently involved in patient's care:  Has seen [facility] in the past  Cardiologist Dr. Bates: Sees every 6 months    The following portions of the patient's history were reviewed and updated as appropriate: allergies, current medications, past family history, past medical history, past social history, past surgical history and problem list.    Active Ambulatory Problems     Diagnosis Date Noted   • Precordial pain 08/02/2017   • Mixed hyperlipidemia 08/02/2017   • Claudication (CMS/[facility]) 08/02/2017   • Atherosclerosis of aorta (CMS/[facility]) 08/02/2017   • Essential hypertension 09/06/2017   • GERD (gastroesophageal reflux disease) 11/12/2018   • Tremor, essential 11/12/2018   • Memory loss 12/12/2018     Resolved Ambulatory Problems     Diagnosis Date Noted   • No Resolved Ambulatory Problems     Past Medical History:   Diagnosis Date   • Anxiety    • Asthma    • GERD (gastroesophageal reflux disease)    • Heart murmur    • Hyperlipidemia    • Hypertension    • Insomnia    • Migraine    • Peripheral neuropathy    • Skin cancer    • Tremors of nervous system       Past Surgical History:   Procedure Laterality Date   • BREAST BIOPSY Right 2017   •  SECTION     • CHOLECYSTECTOMY     • COLONOSCOPY     • TONSILLECTOMY     • TONSILLECTOMY       Family History   Problem Relation Age of Onset   • Lung cancer Mother    • Cancer Mother    • Skin cancer Sister    • Alcohol abuse Sister    • Alcohol abuse Brother    • Dementia Maternal Aunt    • Alzheimer's disease Maternal Uncle    • Cancer Maternal Uncle    • Mental illness Maternal Uncle    • Alzheimer's disease Paternal Aunt    • Cancer Maternal Grandmother    • Heart disease Maternal Grandfather    • Breast cancer Neg Hx    • Endometrial cancer Neg Hx    • Ovarian cancer Neg Hx      Social History     Socioeconomic History   • Marital status:      Spouse name: Not on file   • Number of children: Not on file   • Years of education: Not on file   • Highest education level: Not on file   Social Needs   • Financial resource strain: Not on file   • Food insecurity - worry: Not on file   • Food insecurity - inability: Not on file   • Transportation needs - medical: Not on file   • Transportation needs - non-medical: Not on file   Occupational History   • Not on file   Tobacco Use   • Smoking status: Never Smoker   • Smokeless tobacco: Never Used   Substance and Sexual Activity   • Alcohol use: No   • Drug use: No     Comment: quit when pt was in 20's   • Sexual activity: Defer     Partners: Male   Other Topics Concern   • Not on file   Social History Narrative   • Not on file         Review of Systems   Constitutional: Negative.    HENT: Negative.    Eyes: Negative.    Respiratory: Negative for cough, chest tightness, shortness of breath and wheezing.    Cardiovascular: Negative for chest pain and palpitations.   Gastrointestinal: Negative for abdominal distention, abdominal pain, constipation, diarrhea, nausea and vomiting.   Musculoskeletal: Negative for gait problem and joint swelling.   Skin: Negative for  color change and wound.   Allergic/Immunologic: Negative.    Neurological: Positive for tremors. Negative for headaches.   Psychiatric/Behavioral: Negative for agitation and hallucinations.        Sleep difficulty improved on trazodone; Memory impairment       Objective   Blood pressure 110/68, pulse 62, temperature 98.1 °F (36.7 °C), temperature source Oral, resp. rate 18, weight 77.6 kg (171 lb), last menstrual period 02/01/2016, SpO2 95 %, not currently breastfeeding.  Nursing note reviewed  Physical Exam  Const: NAD, A&Ox4, Pleasant, Cooperative  Eyes: EOMI, no conjunctivitis  ENT: No nasal discharge present, neck supple  Cardiac: Regular rate and rhythm, no cyanosis  Resp: Respiratory rate within normal limits, no increased work of breathing, no audible wheezing or retractions noted  GI: No distention or ascites  MSK: Motor and sensation grossly intact in bilateral upper extremities  Neurologic: CN II-XII grossly intact  Psych: Appropriate mood and behavior.  Skin: Pink, warm, dry  Procedures  Assessment/Plan   Bianka was seen today for follow-up.    Diagnoses and all orders for this visit:    Mixed hyperlipidemia  -     Comprehensive Metabolic Panel; Future  -     Lipid Panel; Future    Essential hypertension  -     Comprehensive Metabolic Panel; Future    Gastroesophageal reflux disease without esophagitis    Class 1 obesity due to excess calories with serious comorbidity and body mass index (BMI) of 31.0 to 31.9 in adult  -     Liraglutide -Weight Management (SAXENDA) 18 MG/3ML solution pen-injector; 0.6mg SC for 7 days; 1.2mg SC for 7 days; 1.8mg SC for 7 days; 2.4mg SC for 7 days; 3mg SC for 7 days  -     Insulin Pen Needle (NOVOFINE PLUS) 32G X 4 MM misc; Use one new needle per injection as directed.  -     phentermine 15 MG capsule; Take 1 capsule by mouth Every Morning.        Chronic health conditions:  #1 hypertension  Stable, at goal. Continue meds.    #2 hyperlipidemia  We will try to get  pitavastatin Paed, she may use a copay card if needed. Consider ASA.    #3 atherosclerosis of aorta  Stable per cardiology    #4 Tremor  Referred to neurology. Appears benign essential, well-compensated on propranolol.    #5 GERD  Continue pantoprazole, try 20mg dose instead of 40mg    Chronic issues not specifically addressed today:  · Obesity: Saenda not covered. Started on phentermine today along with low calorie diet.      Patient Instructions   1.   Have your labs done in about 5 weeks.    2.  If Saxenda is covered, start as instructed. Otherwise ok to start phentermine.      Return in about 6 weeks (around 3/27/2019) for weight loss.    Ambulatory progress note signed and attested to by David Mckeon D.O.

## 2019-02-13 NOTE — PATIENT INSTRUCTIONS
1.   Have your labs done in about 5 weeks.    2.  If Saxenda is covered, start as instructed. Otherwise ok to start phentermine.

## 2019-03-06 RX ORDER — LOSARTAN POTASSIUM AND HYDROCHLOROTHIAZIDE 12.5; 1 MG/1; MG/1
TABLET ORAL
Qty: 30 TABLET | Refills: 2 | Status: SHIPPED | OUTPATIENT
Start: 2019-03-06 | End: 2019-05-02

## 2019-03-23 ENCOUNTER — OFFICE VISIT (OUTPATIENT)
Dept: RETAIL CLINIC | Facility: CLINIC | Age: 59
End: 2019-03-23

## 2019-03-23 VITALS
DIASTOLIC BLOOD PRESSURE: 84 MMHG | HEART RATE: 75 BPM | HEIGHT: 62 IN | BODY MASS INDEX: 30.36 KG/M2 | TEMPERATURE: 97.8 F | SYSTOLIC BLOOD PRESSURE: 128 MMHG | WEIGHT: 165 LBS | OXYGEN SATURATION: 98 % | RESPIRATION RATE: 20 BRPM

## 2019-03-23 DIAGNOSIS — J01.40 ACUTE PANSINUSITIS, RECURRENCE NOT SPECIFIED: ICD-10-CM

## 2019-03-23 DIAGNOSIS — R05.9 COUGHING: Primary | ICD-10-CM

## 2019-03-23 PROCEDURE — 99213 OFFICE O/P EST LOW 20 MIN: CPT | Performed by: NURSE PRACTITIONER

## 2019-03-23 RX ORDER — BENZONATATE 100 MG/1
100 CAPSULE ORAL 3 TIMES DAILY PRN
Qty: 15 CAPSULE | Refills: 0 | Status: SHIPPED | OUTPATIENT
Start: 2019-03-23 | End: 2019-03-28

## 2019-03-23 RX ORDER — AZITHROMYCIN 250 MG/1
250 TABLET, FILM COATED ORAL DAILY
Qty: 6 TABLET | Refills: 0 | Status: SHIPPED | OUTPATIENT
Start: 2019-03-23 | End: 2019-05-02

## 2019-03-23 NOTE — PATIENT INSTRUCTIONS
Cough, Adult  A cough helps to clear your throat and lungs. A cough may last only 2-3 weeks (acute), or it may last longer than 8 weeks (chronic). Many different things can cause a cough. A cough may be a sign of an illness or another medical condition.  Follow these instructions at home:  · Pay attention to any changes in your cough.  · Take medicines only as told by your doctor.  ? If you were prescribed an antibiotic medicine, take it as told by your doctor. Do not stop taking it even if you start to feel better.  ? Talk with your doctor before you try using a cough medicine.  · Drink enough fluid to keep your pee (urine) clear or pale yellow.  · If the air is dry, use a cold steam vaporizer or humidifier in your home.  · Stay away from things that make you cough at work or at home.  · If your cough is worse at night, try using extra pillows to raise your head up higher while you sleep.  · Do not smoke, and try not to be around smoke. If you need help quitting, ask your doctor.  · Do not have caffeine.  · Do not drink alcohol.  · Rest as needed.  Contact a doctor if:  · You have new problems (symptoms).  · You cough up yellow fluid (pus).  · Your cough does not get better after 2-3 weeks, or your cough gets worse.  · Medicine does not help your cough and you are not sleeping well.  · You have pain that gets worse or pain that is not helped with medicine.  · You have a fever.  · You are losing weight and you do not know why.  · You have night sweats.  Get help right away if:  · You cough up blood.  · You have trouble breathing.  · Your heartbeat is very fast.  This information is not intended to replace advice given to you by your health care provider. Make sure you discuss any questions you have with your health care provider.  Document Released: 08/30/2012 Document Revised: 05/25/2017 Document Reviewed: 02/24/2016  [employer] Interactive Patient Education © 2019 [employer]  Sinusitis, Adult  Sinusitis is  soreness and swelling (inflammation) of your sinuses. Sinuses are hollow spaces in the bones around your face. They are located:  · Around your eyes.  · In the middle of your forehead.  · Behind your nose.  · In your cheekbones.    Your sinuses and nasal passages are lined with a stringy fluid (mucus). Mucus normally drains out of your sinuses. Swelling can trap mucus in your sinuses. This lets germs like bacteria or viruses grow, and that leads to infection. Most of the time, sinusitis is caused by a virus.  If bacteria is causing your infection, your doctor may have you wait and see if you get better without antibiotic medicine. You may be prescribed antibiotics if you have:  · A very bad infection.  · A weak disease-fighting (immune) system.    Follow these instructions at home:  Medicines  · Take, use, or apply over-the-counter and prescription medicines only as told by your doctor. These may include nasal sprays.  · If you were prescribed an antibiotic, take it as told by your doctor. Do not stop taking the antibiotic even if you start to feel better.  Hydrate and Humidify  · Drink enough water to keep your pee (urine) pale yellow.  · Use a cool mist humidifier to keep the humidity level in your home above 50%.  · Breathe in steam for 10-15 minutes, 3-4 times a day or as told by your doctor. You can do this in the bathroom while a hot shower is running.  · Try not to spend time in cool or dry air.  Rest  · Rest as much as possible.  · Sleep with your head raised (elevated).  · Make sure to get enough sleep each night.  General instructions  · Put a warm, moist washcloth on your face 3-4 times a day, or as often as told by your doctor. This will help with discomfort.  · Wash your hands often with soap and water. If there is no soap and water, use hand .  · Do not smoke. Avoid being around people who are smoking (secondhand smoke).  · Keep all follow-up visits as told by your doctor. This is  important.  Contact a doctor if:  · You have a fever.  · Your symptoms get worse.  · Your symptoms do not get better within 10 days.  Get help right away if:  · You have a very bad headache.  · You cannot stop throwing up (vomiting).  · You have pain or swelling around your face or eyes.  · You have trouble seeing.  · You feel confused.  · Your neck is stiff.  · You have trouble breathing.  This information is not intended to replace advice given to you by your health care provider. Make sure you discuss any questions you have with your health care provider.  Document Released: 06/05/2009 Document Revised: 06/29/2018 Document Reviewed: 10/12/2016  [employer] Interactive Patient Education © 2019 [employer]    Medications and plan of care reviewed with patient and teaching done on medication reactions/side effects.  Take medication as prescribed, do not take over the counter medications except as discussed, or drink alcohol while on medication.  Monitor for drowsiness with medication, do not drive if drowsiness occurs.  Rest, plenty of fluids, comfort measures, humidifier, warm salt water gargles, saline spray, fever/pain control, and follow up with PCP if symptoms persist.  If worse in any way go to urgent care or ER as discussed.

## 2019-03-23 NOTE — PROGRESS NOTES
KANDACE@  Bianka Fisher is a 59 y.o. female presents with possible sinus infection and cough.  Patient states symptoms started with an allergy flare up due to exposure to some animals in client's home.  States symptoms have continued and now believes that she has a sinus infection.   has been taking OTC medications without any relief.  Chief Complaint   Patient presents with   • Sinusitis   • Cough      Sinusitis   This is a new problem. The current episode started 1 to 4 weeks ago. The problem has been gradually worsening since onset. Maximum temperature: subjective, has not checked. Her pain is at a severity of 10/10. The pain is severe. Associated symptoms include chills, congestion, coughing, ear pain, headaches, sinus pressure and a sore throat. Pertinent negatives include no hoarse voice or shortness of breath. Treatments tried: cough med, ibuprofen, dayquil. The treatment provided mild relief.   Cough   This is a new problem. The current episode started 1 to 4 weeks ago. The problem has been gradually improving. The cough is productive of sputum. Associated symptoms include chills, ear congestion, ear pain, a fever, headaches, nasal congestion, postnasal drip, rhinorrhea, a sore throat and wheezing (occasional). Pertinent negatives include no chest pain, hemoptysis or shortness of breath. The symptoms are aggravated by lying down. Risk factors for lung disease include animal exposure. She has tried OTC cough suppressant for the symptoms. The treatment provided no relief. Her past medical history is significant for asthma and environmental allergies.        The following portions of the patient's history were reviewed and updated as appropriate: allergies, current medications, past family history, past medical history, past social history, past surgical history and problem list.    Current Outpatient Medications:   •  cetirizine (zyrTEC) 10 MG tablet, Take 10 mg by mouth Daily., Disp: , Rfl:   •  " losartan-hydrochlorothiazide (HYZAAR) 100-12.5 MG per tablet, TAKE 1 TABLET BY MOUTH EVERY DAY, Disp: 30 tablet, Rfl: 2  •  pantoprazole (PROTONIX) 20 MG EC tablet, Take 1 tablet by mouth Daily., Disp: 90 tablet, Rfl: 0  •  pitavastatin calcium (LIVALO) 2 MG tablet tablet, Take 1 tablet by mouth Every Night., Disp: 30 tablet, Rfl: 2  •  propranolol (INDERAL) 60 MG tablet, Take 1 tablet by mouth Daily., Disp: 30 tablet, Rfl: 5  •  traZODone (DESYREL) 150 MG tablet, Take 150 mg by mouth Every Night., Disp: , Rfl:     Allergies   Allergen Reactions   • Crestor [Rosuvastatin Calcium] Hives       Review of Systems   Constitutional: Positive for appetite change (decreased), chills, fatigue and fever. Negative for unexpected weight change.   HENT: Positive for congestion, ear pain, postnasal drip, rhinorrhea, sinus pressure, sinus pain and sore throat. Negative for hoarse voice and trouble swallowing.    Eyes: Negative.    Respiratory: Positive for cough and wheezing (occasional). Negative for hemoptysis, chest tightness, shortness of breath and stridor.    Cardiovascular: Negative.  Negative for chest pain.   Gastrointestinal: Negative.    Genitourinary: Negative.    Musculoskeletal: Negative.    Skin: Negative.    Allergic/Immunologic: Positive for environmental allergies.   Neurological: Positive for headaches. Negative for dizziness and syncope.       Objective     Visit Vitals  /84 (BP Location: Left arm)   Pulse 75   Temp 97.8 °F (36.6 °C) (Oral)   Resp 20   Ht 157.5 cm (62\")   Wt 74.8 kg (165 lb)   LMP 02/01/2016   SpO2 98%   BMI 30.18 kg/m²         Physical Exam   Constitutional: She is oriented to person, place, and time. She appears well-developed and well-nourished. No distress.   HENT:   Head: Normocephalic and atraumatic.   Right Ear: Hearing, external ear and ear canal normal. No drainage or tenderness. Tympanic membrane is not erythematous. A middle ear effusion is present.   Left Ear: Hearing, " external ear and ear canal normal. No drainage or tenderness. Tympanic membrane is not erythematous. A middle ear effusion is present.   Nose: Mucosal edema and sinus tenderness present. Right sinus exhibits maxillary sinus tenderness and frontal sinus tenderness. Left sinus exhibits maxillary sinus tenderness and frontal sinus tenderness.   Mouth/Throat: Uvula is midline and mucous membranes are normal. Posterior oropharyngeal erythema (mild erythema with PND) present. Tonsils are 0 on the right. Tonsils are 0 on the left. No tonsillar exudate.   Eyes: Conjunctivae and lids are normal. Pupils are equal, round, and reactive to light.   Neck: Normal range of motion.   Cardiovascular: Normal rate and regular rhythm.   Pulmonary/Chest: Effort normal and breath sounds normal. No respiratory distress. She has no wheezes.   Lymphadenopathy:     She has no cervical adenopathy.   Neurological: She is alert and oriented to person, place, and time.   Skin: Skin is warm and dry. Capillary refill takes less than 2 seconds. No rash noted.   Psychiatric: She has a normal mood and affect. Her speech is normal and behavior is normal.       Lab Results (last 24 hours)     ** No results found for the last 24 hours. **          Assessment/Plan     Bianka was seen today for sinusitis and cough.    Diagnoses and all orders for this visit:    Coughing  Acute pansinusitis, recurrence not specified  -     azithromycin (ZITHROMAX Z-SOLO) 250 MG tablet; Take 1 tablet by mouth Daily. Take 2 tablets the first day, then 1 tablet daily for 4 days.(patient request, states only zpack will help, states doxycycline and augmentin has reasons she cant take just cant remember why).  -     benzonatate (TESSALON) 100 MG capsule; Take 1 capsule by mouth 3 (Three) Times a Day As Needed for Cough for up to 5 days.        Medications and plan of care reviewed with patient and teaching done on medication reactions/side effects.  Take medication as prescribed,  do not take over the counter medications except as discussed, or drink alcohol while on medication.  Monitor for drowsiness with medication, do not drive if drowsiness occurs.  Rest, plenty of fluids, comfort measures, humidifier, warm salt water gargles, saline spray, fever/pain control, and follow up with PCP if symptoms persist.  If worse in any way go to urgent care or ER as discussed.  Patient verbalized understanding.    JUVENAL Montes

## 2019-05-02 ENCOUNTER — OFFICE VISIT (OUTPATIENT)
Dept: FAMILY MEDICINE CLINIC | Facility: CLINIC | Age: 59
End: 2019-05-02

## 2019-05-02 ENCOUNTER — LAB (OUTPATIENT)
Dept: LAB | Facility: HOSPITAL | Age: 59
End: 2019-05-02

## 2019-05-02 VITALS
HEIGHT: 62 IN | DIASTOLIC BLOOD PRESSURE: 70 MMHG | SYSTOLIC BLOOD PRESSURE: 128 MMHG | WEIGHT: 160 LBS | BODY MASS INDEX: 29.44 KG/M2 | HEART RATE: 65 BPM | OXYGEN SATURATION: 97 %

## 2019-05-02 DIAGNOSIS — E78.2 MIXED HYPERLIPIDEMIA: Primary | ICD-10-CM

## 2019-05-02 DIAGNOSIS — E66.09 CLASS 1 OBESITY DUE TO EXCESS CALORIES WITH SERIOUS COMORBIDITY AND BODY MASS INDEX (BMI) OF 31.0 TO 31.9 IN ADULT: ICD-10-CM

## 2019-05-02 DIAGNOSIS — E78.2 MIXED HYPERLIPIDEMIA: ICD-10-CM

## 2019-05-02 DIAGNOSIS — I10 ESSENTIAL HYPERTENSION: ICD-10-CM

## 2019-05-02 LAB
ALBUMIN SERPL-MCNC: 4.3 G/DL (ref 3.5–5.2)
ALBUMIN/GLOB SERPL: 1.3 G/DL
ALP SERPL-CCNC: 84 U/L (ref 39–117)
ALT SERPL W P-5'-P-CCNC: 13 U/L (ref 1–33)
ANION GAP SERPL CALCULATED.3IONS-SCNC: 10.5 MMOL/L
AST SERPL-CCNC: 17 U/L (ref 1–32)
BILIRUB SERPL-MCNC: 1.1 MG/DL (ref 0.2–1.2)
BUN BLD-MCNC: 12 MG/DL (ref 6–20)
BUN/CREAT SERPL: 9.5 (ref 7–25)
CALCIUM SPEC-SCNC: 10.6 MG/DL (ref 8.6–10.5)
CHLORIDE SERPL-SCNC: 102 MMOL/L (ref 98–107)
CHOLEST SERPL-MCNC: 170 MG/DL (ref 0–200)
CO2 SERPL-SCNC: 26.5 MMOL/L (ref 22–29)
CREAT BLD-MCNC: 1.26 MG/DL (ref 0.57–1)
GFR SERPL CREATININE-BSD FRML MDRD: 43 ML/MIN/1.73
GLOBULIN UR ELPH-MCNC: 3.4 GM/DL
GLUCOSE BLD-MCNC: 97 MG/DL (ref 65–99)
HDLC SERPL-MCNC: 45 MG/DL (ref 40–60)
LDLC SERPL CALC-MCNC: 109 MG/DL (ref 0–100)
LDLC/HDLC SERPL: 2.42 {RATIO}
POTASSIUM BLD-SCNC: 3.7 MMOL/L (ref 3.5–5.2)
PROT SERPL-MCNC: 7.7 G/DL (ref 6–8.5)
SODIUM BLD-SCNC: 139 MMOL/L (ref 136–145)
TRIGL SERPL-MCNC: 80 MG/DL (ref 0–150)
VLDLC SERPL-MCNC: 16 MG/DL (ref 5–40)

## 2019-05-02 PROCEDURE — 80061 LIPID PANEL: CPT | Performed by: FAMILY MEDICINE

## 2019-05-02 PROCEDURE — 80053 COMPREHEN METABOLIC PANEL: CPT | Performed by: FAMILY MEDICINE

## 2019-05-02 PROCEDURE — 99214 OFFICE O/P EST MOD 30 MIN: CPT | Performed by: FAMILY MEDICINE

## 2019-05-02 RX ORDER — PHENTERMINE HYDROCHLORIDE 15 MG/1
15 CAPSULE ORAL EVERY MORNING
Refills: 2 | COMMUNITY
Start: 2019-04-21 | End: 2019-08-02 | Stop reason: SDUPTHER

## 2019-05-02 RX ORDER — LOSARTAN POTASSIUM AND HYDROCHLOROTHIAZIDE 12.5; 1 MG/1; MG/1
0.5 TABLET ORAL DAILY
Qty: 30 TABLET | Refills: 2
Start: 2019-05-02 | End: 2019-06-17 | Stop reason: SDUPTHER

## 2019-05-02 NOTE — PROGRESS NOTES
Subjective   Bianka Fisher is a 59 y.o. female.     Chief Complaint   Patient presents with   • Follow-up       History of Present Illness     Bianka Fisher presents today for follow-up obesity and hyperlipidemia.  She was started on phentermine 15 mg capsules in February.  She is doing very well on this, has been able to lose 15 pounds by her home scale.  This has improved her mood, energy, and low back pain.  She has also started using CBD oil tincture.    The following portions of the patient's history were reviewed and updated as appropriate: allergies, current medications, past family history, past medical history, past social history, past surgical history and problem list.    Active Ambulatory Problems     Diagnosis Date Noted   • Precordial pain 2017   • Mixed hyperlipidemia 2017   • Claudication (CMS/[facility]) 2017   • Atherosclerosis of aorta (CMS/[facility]) 2017   • Essential hypertension 2017   • GERD (gastroesophageal reflux disease) 2018   • Tremor, essential 2018   • Memory loss 2018     Resolved Ambulatory Problems     Diagnosis Date Noted   • No Resolved Ambulatory Problems     Past Medical History:   Diagnosis Date   • Anxiety    • Asthma    • GERD (gastroesophageal reflux disease)    • Heart murmur    • Hyperlipidemia    • Hypertension    • Insomnia    • Migraine    • Peripheral neuropathy    • Skin cancer    • Tremors of nervous system      Past Surgical History:   Procedure Laterality Date   • BREAST BIOPSY Right 2017   •  SECTION     • CHOLECYSTECTOMY     • COLONOSCOPY     • TONSILLECTOMY     • TONSILLECTOMY       Family History   Problem Relation Age of Onset   • Lung cancer Mother    • Cancer Mother    • Skin cancer Sister    • Alcohol abuse Sister    • Alcohol abuse Brother    • Dementia Maternal Aunt    • Alzheimer's disease Maternal Uncle    • Cancer Maternal Uncle    • Mental illness Maternal Uncle    • Alzheimer's disease Paternal Aunt  "   • Cancer Maternal Grandmother    • Heart disease Maternal Grandfather    • Breast cancer Neg Hx    • Endometrial cancer Neg Hx    • Ovarian cancer Neg Hx      Social History     Socioeconomic History   • Marital status:      Spouse name: Not on file   • Number of children: Not on file   • Years of education: Not on file   • Highest education level: Not on file   Tobacco Use   • Smoking status: Never Smoker   • Smokeless tobacco: Never Used   Substance and Sexual Activity   • Alcohol use: No   • Drug use: No     Comment: quit when pt was in 20's   • Sexual activity: Defer     Partners: Male         Review of Systems   Constitutional: Negative.    Respiratory: Negative.    Cardiovascular: Negative.        Objective   Blood pressure 128/70, pulse 65, height 157.5 cm (62.01\"), weight 72.6 kg (160 lb), last menstrual period 02/01/2016, SpO2 97 %, not currently breastfeeding.  Nursing note reviewed  Physical Exam  Const: NAD, A&Ox4, Pleasant, Cooperative  Eyes: EOMI, no conjunctivitis  ENT: No nasal discharge present, neck supple  Cardiac: Regular rate and rhythm, no cyanosis  Resp: Respiratory rate within normal limits, no increased work of breathing, no audible wheezing or retractions noted  GI: No distention or ascites  MSK: Motor and sensation grossly intact in bilateral upper extremities  Neurologic: CN II-XII grossly intact  Psych: Appropriate mood and behavior.  Skin: Pink, warm, dry  Procedures  Assessment/Plan   Bianka was seen today for follow-up.    Diagnoses and all orders for this visit:    Mixed hyperlipidemia    Essential hypertension  -     losartan-hydrochlorothiazide (HYZAAR) 100-12.5 MG per tablet; Take 0.5 tablets by mouth Daily.    Class 1 obesity due to excess calories with serious comorbidity and body mass index (BMI) of 31.0 to 31.9 in adult        #1 hypertension  Stable, at goal.  With her ongoing weight loss, it is reasonable for her to reduce her dosage of losartan, I would like her " to try cutting her dose in half for the time being and checking her pressures.    #2 hyperlipidemia  Continue pravastatin  Recheck lipids today    #3 atherosclerosis of aorta  Stable per cardiology    #4  Obesity  Continue phentermine 15 mg daily  This patient is on a controlled substance which improves symptoms/quality of life and is aware of the risks, benefits and possible side-effects current treatment. The patient denies any medication side-effects at this time. A controlled substance agreement will be obtained or is currently on file. We reviewed required monitoring for controlled substances including but not limited to quarterly follow-up visits, annual depression screening, and urine drug screens to which the patient is agreeable. A [facility] report has been or shortly will be reviewed. There are no signs of deviation or misuse.      Patient Instructions   1.  Continue medications and supplements - as listed.  · Start cutting your losartan in half, take it at any time of day.    2.  Continue well-balanced diet - low in calories and low in added sugar.    3.  Maintain a routine exercise program - every week.    4.  A letter will be sent with your test results.      Return in about 3 months (around 8/2/2019).    Ambulatory progress note signed and attested to by David Mckeon D.O.

## 2019-05-02 NOTE — PATIENT INSTRUCTIONS
1.  Continue medications and supplements - as listed.  · Start cutting your losartan in half, take it at any time of day.    2.  Continue well-balanced diet - low in calories and low in added sugar.    3.  Maintain a routine exercise program - every week.    4.  A letter will be sent with your test results.

## 2019-05-06 ENCOUNTER — OFFICE VISIT (OUTPATIENT)
Dept: CARDIOLOGY | Facility: CLINIC | Age: 59
End: 2019-05-06

## 2019-05-06 VITALS
SYSTOLIC BLOOD PRESSURE: 126 MMHG | WEIGHT: 162 LBS | OXYGEN SATURATION: 97 % | BODY MASS INDEX: 29.81 KG/M2 | DIASTOLIC BLOOD PRESSURE: 80 MMHG | HEIGHT: 62 IN | HEART RATE: 62 BPM

## 2019-05-06 DIAGNOSIS — I10 ESSENTIAL HYPERTENSION: Primary | ICD-10-CM

## 2019-05-06 DIAGNOSIS — E78.2 MIXED HYPERLIPIDEMIA: ICD-10-CM

## 2019-05-06 PROCEDURE — 99214 OFFICE O/P EST MOD 30 MIN: CPT | Performed by: INTERNAL MEDICINE

## 2019-05-06 NOTE — PROGRESS NOTES
"[facility] at [facility]  Office visit  Bianka Fisher  1960  [phone]    VISIT DATE:  5/6/2019    PCP: David Mckeon DO  [address] [facility] [address]    CC:  Chief Complaint   Patient presents with   • Hypertension   • Hyperlipidemia       ASSESSMENT:   Diagnosis Plan   1. Essential hypertension     2. Mixed hyperlipidemia         PLAN:  Continue atorvastatin 2 mg p.o. daily, goal LDL less than 100, intolerance to multiple other statins.  Reasonable control currently.  Continue propranolol(predominantly being used for essential tremor)  Continue losartan 100 mg -hydrochlorothiazide 12.5 mg, goal blood pressure less than 130/80  Atypical chest pain: Noncardiac, suspect pleuritic versus intercostal muscle strain.    Subjective  Blood pressures probably running less than 140/90 mmHg..  Currently denies chest pain, dyspnea or palpitations.  She appears compliant with medical therapy.  Denies myalgias on statin.  Reviewed most recent fasting lipid panel.  Intermittent sharp localized anterior chest wall pain which is triggered by certain positions and worsens with a deep breath, last less than 1 minute.    PHYSICAL EXAMINATION:  Vitals:    05/06/19 1438   BP: 126/80   BP Location: Right arm   Patient Position: Sitting   Pulse: 62   SpO2: 97%   Weight: 73.5 kg (162 lb)   Height: 157.5 cm (62\")     General Appearance:    Alert, cooperative, no distress, appears stated age   Head:    Normocephalic, without obvious abnormality, atraumatic   Eyes:    conjunctiva/corneas clear   Nose:   Nares normal, septum midline, mucosa normal, no drainage   Throat:   Lips, teeth and gums normal   Neck:   Supple, symmetrical, trachea midline, no carotid    bruit or JVD   Lungs:     Clear to auscultation bilaterally, respirations unlabored   Chest Wall:    No tenderness or deformity    Heart:    Regular rate and rhythm, S1 and S2 normal, no murmur, rub   or gallop, normal carotid impulse " bilaterally without bruit.   Abdomen:     Soft, non-tender   Extremities:   Extremities normal, atraumatic, no cyanosis or edema   Pulses:   2+ and symmetric all extremities   Skin:   Skin color, texture, turgor normal, no rashes or lesions       Diagnostic Data:  Procedures  Lab Results   Component Value Date    TRIG 80 05/02/2019    HDL 45 05/02/2019     Lab Results   Component Value Date    GLUCOSE 97 05/02/2019    BUN 12 05/02/2019    CREATININE 1.26 (H) 05/02/2019     05/02/2019    K 3.7 05/02/2019     05/02/2019    CO2 26.5 05/02/2019     Lab Results   Component Value Date    HGBA1C 5.90 (H) 11/12/2018     Lab Results   Component Value Date    WBC 8.95 11/12/2018    HGB 12.2 11/12/2018    HCT 38.6 11/12/2018     11/12/2018       Allergies  Allergies   Allergen Reactions   • Crestor [Rosuvastatin Calcium] Hives       Current Medications    Current Outpatient Medications:   •  cetirizine (zyrTEC) 10 MG tablet, Take 10 mg by mouth Daily., Disp: , Rfl:   •  losartan-hydrochlorothiazide (HYZAAR) 100-12.5 MG per tablet, Take 0.5 tablets by mouth Daily., Disp: 30 tablet, Rfl: 2  •  pantoprazole (PROTONIX) 20 MG EC tablet, Take 1 tablet by mouth Daily., Disp: 90 tablet, Rfl: 0  •  phentermine 15 MG capsule, Take 15 mg by mouth Every Morning., Disp: , Rfl: 2  •  pitavastatin calcium (LIVALO) 2 MG tablet tablet, Take 1 tablet by mouth Every Night., Disp: 30 tablet, Rfl: 2  •  propranolol (INDERAL) 60 MG tablet, Take 1 tablet by mouth Daily., Disp: 30 tablet, Rfl: 5  •  traZODone (DESYREL) 150 MG tablet, Take 150 mg by mouth Every Night., Disp: , Rfl:           ROS  Review of Systems   Cardiovascular: Positive for chest pain and leg swelling. Negative for dyspnea on exertion and palpitations.   Respiratory: Negative for cough, shortness of breath, snoring and wheezing.    Neurological: Positive for dizziness.   Allergic/Immunologic: Positive for environmental allergies.       SOCIAL HX  Social History      Socioeconomic History   • Marital status:      Spouse name: Not on file   • Number of children: Not on file   • Years of education: Not on file   • Highest education level: Not on file   Tobacco Use   • Smoking status: Never Smoker   • Smokeless tobacco: Never Used   Substance and Sexual Activity   • Alcohol use: No   • Drug use: No     Comment: quit when pt was in 20's   • Sexual activity: Defer     Partners: Male       FAMILY HX  Family History   Problem Relation Age of Onset   • Lung cancer Mother    • Cancer Mother    • Skin cancer Sister    • Alcohol abuse Sister    • Alcohol abuse Brother    • Dementia Maternal Aunt    • Alzheimer's disease Maternal Uncle    • Cancer Maternal Uncle    • Mental illness Maternal Uncle    • Alzheimer's disease Paternal Aunt    • Cancer Maternal Grandmother    • Heart disease Maternal Grandfather    • Breast cancer Neg Hx    • Endometrial cancer Neg Hx    • Ovarian cancer Neg Hx              Valentino Bates III, MD, FACC

## 2019-05-17 DIAGNOSIS — K21.9 GASTROESOPHAGEAL REFLUX DISEASE WITHOUT ESOPHAGITIS: ICD-10-CM

## 2019-05-17 DIAGNOSIS — E78.2 MIXED HYPERLIPIDEMIA: ICD-10-CM

## 2019-05-17 RX ORDER — PANTOPRAZOLE SODIUM 20 MG/1
TABLET, DELAYED RELEASE ORAL
Qty: 90 TABLET | Refills: 0 | Status: SHIPPED | OUTPATIENT
Start: 2019-05-17 | End: 2020-05-08

## 2019-05-17 RX ORDER — PITAVASTATIN CALCIUM 2.09 MG/1
TABLET, FILM COATED ORAL
Qty: 30 TABLET | Refills: 2 | Status: SHIPPED | OUTPATIENT
Start: 2019-05-17 | End: 2019-06-28 | Stop reason: SDUPTHER

## 2019-05-22 ENCOUNTER — PATIENT MESSAGE (OUTPATIENT)
Dept: FAMILY MEDICINE CLINIC | Facility: CLINIC | Age: 59
End: 2019-05-22

## 2019-05-23 RX ORDER — CETIRIZINE HYDROCHLORIDE 10 MG/1
10 TABLET ORAL DAILY
Qty: 90 TABLET | Refills: 1 | Status: SHIPPED | OUTPATIENT
Start: 2019-05-23 | End: 2019-11-17 | Stop reason: SDUPTHER

## 2019-05-23 RX ORDER — TRAZODONE HYDROCHLORIDE 150 MG/1
150 TABLET ORAL NIGHTLY
Qty: 90 TABLET | Refills: 1 | Status: SHIPPED | OUTPATIENT
Start: 2019-05-23 | End: 2019-11-06 | Stop reason: SDUPTHER

## 2019-05-23 NOTE — TELEPHONE ENCOUNTER
From: Bianka Fisher  To: David Mckeon DO  Sent: 5/22/2019 9:41 PM EDT  Subject: Prescription Question    I need refills on Trazodone and Certrizine at [facility] on [address] right away. I am completely out. [facility] SAID THEY CONTACTED YOU BUT DID NOT GET A RESPONSE.  THANKS. BIANKA FISHER.

## 2019-06-17 DIAGNOSIS — I10 ESSENTIAL HYPERTENSION: ICD-10-CM

## 2019-06-17 RX ORDER — LOSARTAN POTASSIUM AND HYDROCHLOROTHIAZIDE 12.5; 1 MG/1; MG/1
0.5 TABLET ORAL DAILY
Qty: 15 TABLET | Refills: 2 | Status: SHIPPED | OUTPATIENT
Start: 2019-06-17 | End: 2019-08-02 | Stop reason: SINTOL

## 2019-06-17 RX ORDER — LOSARTAN POTASSIUM AND HYDROCHLOROTHIAZIDE 12.5; 1 MG/1; MG/1
TABLET ORAL
Qty: 30 TABLET | Refills: 2 | OUTPATIENT
Start: 2019-06-17

## 2019-06-28 ENCOUNTER — PATIENT MESSAGE (OUTPATIENT)
Dept: FAMILY MEDICINE CLINIC | Facility: CLINIC | Age: 59
End: 2019-06-28

## 2019-06-28 DIAGNOSIS — E78.2 MIXED HYPERLIPIDEMIA: ICD-10-CM

## 2019-06-28 DIAGNOSIS — G25.0 TREMOR, ESSENTIAL: Primary | ICD-10-CM

## 2019-06-28 RX ORDER — PROPRANOLOL HYDROCHLORIDE 60 MG/1
60 TABLET ORAL DAILY
Qty: 30 TABLET | Refills: 5 | Status: SHIPPED | OUTPATIENT
Start: 2019-06-28 | End: 2019-06-29

## 2019-06-28 RX ORDER — PITAVASTATIN CALCIUM 2.09 MG/1
TABLET, FILM COATED ORAL
Qty: 30 TABLET | Refills: 2 | Status: SHIPPED | OUTPATIENT
Start: 2019-06-28 | End: 2019-09-27 | Stop reason: SDUPTHER

## 2019-06-29 RX ORDER — PROPRANOLOL HCL 60 MG
60 CAPSULE, EXTENDED RELEASE 24HR ORAL DAILY
Qty: 90 CAPSULE | Refills: 3 | Status: SHIPPED | OUTPATIENT
Start: 2019-06-29 | End: 2020-07-28 | Stop reason: SDUPTHER

## 2019-08-02 ENCOUNTER — APPOINTMENT (OUTPATIENT)
Dept: LAB | Facility: HOSPITAL | Age: 59
End: 2019-08-02

## 2019-08-02 ENCOUNTER — OFFICE VISIT (OUTPATIENT)
Dept: FAMILY MEDICINE CLINIC | Facility: CLINIC | Age: 59
End: 2019-08-02

## 2019-08-02 VITALS
BODY MASS INDEX: 30 KG/M2 | OXYGEN SATURATION: 97 % | DIASTOLIC BLOOD PRESSURE: 80 MMHG | WEIGHT: 163 LBS | HEIGHT: 62 IN | SYSTOLIC BLOOD PRESSURE: 124 MMHG | HEART RATE: 63 BPM

## 2019-08-02 DIAGNOSIS — I95.1 ORTHOSTATIC HYPOTENSION: ICD-10-CM

## 2019-08-02 DIAGNOSIS — I10 ESSENTIAL HYPERTENSION: Primary | ICD-10-CM

## 2019-08-02 DIAGNOSIS — E66.09 CLASS 1 OBESITY DUE TO EXCESS CALORIES WITH SERIOUS COMORBIDITY AND BODY MASS INDEX (BMI) OF 31.0 TO 31.9 IN ADULT: ICD-10-CM

## 2019-08-02 DIAGNOSIS — G25.0 TREMOR, ESSENTIAL: ICD-10-CM

## 2019-08-02 LAB
ALBUMIN SERPL-MCNC: 4.2 G/DL (ref 3.5–5.2)
ALBUMIN/GLOB SERPL: 1.4 G/DL
ALP SERPL-CCNC: 74 U/L (ref 39–117)
ALT SERPL W P-5'-P-CCNC: 14 U/L (ref 1–33)
ANION GAP SERPL CALCULATED.3IONS-SCNC: 10.7 MMOL/L (ref 5–15)
AST SERPL-CCNC: 21 U/L (ref 1–32)
BILIRUB SERPL-MCNC: 0.8 MG/DL (ref 0.2–1.2)
BUN BLD-MCNC: 13 MG/DL (ref 6–20)
BUN/CREAT SERPL: 11.1 (ref 7–25)
CALCIUM SPEC-SCNC: 10.2 MG/DL (ref 8.6–10.5)
CHLORIDE SERPL-SCNC: 100 MMOL/L (ref 98–107)
CO2 SERPL-SCNC: 29.3 MMOL/L (ref 22–29)
CREAT BLD-MCNC: 1.17 MG/DL (ref 0.57–1)
GFR SERPL CREATININE-BSD FRML MDRD: 47 ML/MIN/1.73
GLOBULIN UR ELPH-MCNC: 3 GM/DL
GLUCOSE BLD-MCNC: 95 MG/DL (ref 65–99)
POTASSIUM BLD-SCNC: 4.1 MMOL/L (ref 3.5–5.2)
PROT SERPL-MCNC: 7.2 G/DL (ref 6–8.5)
SODIUM BLD-SCNC: 140 MMOL/L (ref 136–145)
TSH SERPL DL<=0.05 MIU/L-ACNC: 1.73 MIU/ML (ref 0.27–4.2)

## 2019-08-02 PROCEDURE — 84443 ASSAY THYROID STIM HORMONE: CPT | Performed by: FAMILY MEDICINE

## 2019-08-02 PROCEDURE — 80053 COMPREHEN METABOLIC PANEL: CPT | Performed by: FAMILY MEDICINE

## 2019-08-02 PROCEDURE — 99214 OFFICE O/P EST MOD 30 MIN: CPT | Performed by: FAMILY MEDICINE

## 2019-08-02 RX ORDER — PHENTERMINE HYDROCHLORIDE 15 MG/1
15 CAPSULE ORAL EVERY MORNING
Qty: 30 CAPSULE | Refills: 2 | Status: SHIPPED | OUTPATIENT
Start: 2019-08-02 | End: 2020-05-08

## 2019-08-02 RX ORDER — LOSARTAN POTASSIUM 50 MG/1
50 TABLET ORAL DAILY
Qty: 90 TABLET | Refills: 1 | Status: SHIPPED | OUTPATIENT
Start: 2019-08-02 | End: 2020-03-25

## 2019-08-02 NOTE — PATIENT INSTRUCTIONS
1.  Continue medications and supplements - as listed.    2.  Continue well-balanced diet - low in calories and low in added sugar.    3.  Maintain a routine exercise program - every week.    4.  A letter will be sent with your test results.    5.  Weekly goal list with 5 goals every day. Check off your successes.  - Exercise 30 minutes daily or 7000 steps daily  - 64 oz of water per day

## 2019-08-02 NOTE — PROGRESS NOTES
Subjective   Bianka Fisher is a 59 y.o. female.     Chief Complaint   Patient presents with   • Follow-up       History of Present Illness     Bianka Fisher presents today for follow-up obesity and hyperlipidemia.  She was started on phentermine 15 mg capsules in February.  Initially did quite well with this, lost 15 home scale.  Since then her weight has stagnated around 162. She admits she does not exercise regularly. She has also started using CBD oil tincture. She reports getting lightheaded on standing, lasts about 15 seconds and resolves. Low water intake.    The following portions of the patient's history were reviewed and updated as appropriate: allergies, current medications, past family history, past medical history, past social history, past surgical history and problem list.    Active Ambulatory Problems     Diagnosis Date Noted   • Precordial pain 2017   • Mixed hyperlipidemia 2017   • Claudication (CMS/[facility]) 2017   • Atherosclerosis of aorta (CMS/[facility]) 2017   • Essential hypertension 2017   • GERD (gastroesophageal reflux disease) 2018   • Tremor, essential 2018   • Memory loss 2018     Resolved Ambulatory Problems     Diagnosis Date Noted   • No Resolved Ambulatory Problems     Past Medical History:   Diagnosis Date   • Anxiety    • Asthma    • GERD (gastroesophageal reflux disease)    • Heart murmur    • Hyperlipidemia    • Hypertension    • Insomnia    • Migraine    • Peripheral neuropathy    • Skin cancer    • Tremors of nervous system      Past Surgical History:   Procedure Laterality Date   • BREAST BIOPSY Right 2017   •  SECTION     • CHOLECYSTECTOMY     • COLONOSCOPY     • TONSILLECTOMY     • TONSILLECTOMY       Family History   Problem Relation Age of Onset   • Lung cancer Mother    • Cancer Mother    • Skin cancer Sister    • Alcohol abuse Sister    • Alcohol abuse Brother    • Dementia Maternal Aunt    • Alzheimer's disease  "Maternal Uncle    • Cancer Maternal Uncle    • Mental illness Maternal Uncle    • Alzheimer's disease Paternal Aunt    • Cancer Maternal Grandmother    • Heart disease Maternal Grandfather    • Breast cancer Neg Hx    • Endometrial cancer Neg Hx    • Ovarian cancer Neg Hx      Social History     Socioeconomic History   • Marital status:      Spouse name: Not on file   • Number of children: Not on file   • Years of education: Not on file   • Highest education level: Not on file   Tobacco Use   • Smoking status: Never Smoker   • Smokeless tobacco: Never Used   Substance and Sexual Activity   • Alcohol use: No   • Drug use: No     Comment: quit when pt was in 20's   • Sexual activity: Defer     Partners: Male         Review of Systems   Constitutional: Negative.    Respiratory: Negative.    Cardiovascular: Negative.    Neurological: Positive for light-headedness.       Objective   Blood pressure 124/80, pulse 63, height 157.5 cm (62.01\"), weight 73.9 kg (163 lb), last menstrual period 02/01/2016, SpO2 97 %, not currently breastfeeding.  Nursing note reviewed  Physical Exam  Const: NAD, A&Ox4, Pleasant, Cooperative  Eyes: EOMI, no conjunctivitis  ENT: No nasal discharge present, neck supple  Cardiac: Regular rate and rhythm, no cyanosis  Resp: Respiratory rate within normal limits, no increased work of breathing, no audible wheezing or retractions noted  GI: No distention or ascites  MSK: Motor and sensation grossly intact in bilateral upper extremities  Neurologic: CN II-XII grossly intact  Psych: Appropriate mood and behavior.  Skin: Pink, warm, dry  Procedures  Assessment/Plan   Bianka was seen today for follow-up.    Diagnoses and all orders for this visit:    Essential hypertension  Comments:  Remove HCTZ, continue losartan 50mg daily. Continue weight loss.  Orders:  -     losartan (COZAAR) 50 MG tablet; Take 1 tablet by mouth Daily.    Class 1 obesity due to excess calories with serious comorbidity and body " mass index (BMI) of 31.0 to 31.9 in adult  -     phentermine 15 MG capsule; Take 1 capsule by mouth Every Morning.  -     Ambulatory Referral to Nutrition Services    Tremor, essential  Comments:  Continue propranolol ER 60mg  Orders:  -     Comprehensive Metabolic Panel; Future  -     TSH; Future  -     Comprehensive Metabolic Panel  -     TSH    Orthostatic hypotension  Comments:  Increase water intake, remove HCTZ  Orders:  -     Comprehensive Metabolic Panel; Future  -     TSH; Future  -     Comprehensive Metabolic Panel  -     TSH        #1 hypertension  Stable, at goal.  Remove HCTZ, continue losartan 50mg    #2 hyperlipidemia  Continue pitavastatin 2mg  Recheck lipids today    #3 atherosclerosis of aorta  Stable per cardiology    #4  Obesity  Continue phentermine 15 mg daily  This patient is on a controlled substance which improves symptoms/quality of life and is aware of the risks, benefits and possible side-effects current treatment. The patient denies any medication side-effects at this time. A controlled substance agreement will be obtained or is currently on file. We reviewed required monitoring for controlled substances including but not limited to quarterly follow-up visits, annual depression screening, and urine drug screens to which the patient is agreeable. A MICHELA report has been or shortly will be reviewed. There are no signs of deviation or misuse.  -weight loss recs discussed as below  -ref to nutrition      Patient Instructions   1.  Continue medications and supplements - as listed.    2.  Continue well-balanced diet - low in calories and low in added sugar.    3.  Maintain a routine exercise program - every week.    4.  A letter will be sent with your test results.    5.  Weekly goal list with 5 goals every day. Check off your successes.  - Exercise 30 minutes daily or 7000 steps daily  - 64 oz of water per day      Return in about 3 months (around 11/2/2019).    Ambulatory progress note  signed and attested to by David Mckeon D.O.

## 2019-09-27 DIAGNOSIS — E78.2 MIXED HYPERLIPIDEMIA: ICD-10-CM

## 2019-09-27 RX ORDER — PITAVASTATIN CALCIUM 2.09 MG/1
TABLET, FILM COATED ORAL
Qty: 30 TABLET | Refills: 2 | Status: SHIPPED | OUTPATIENT
Start: 2019-09-27 | End: 2020-01-06

## 2019-11-06 RX ORDER — TRAZODONE HYDROCHLORIDE 150 MG/1
TABLET ORAL
Qty: 30 TABLET | Refills: 1 | Status: SHIPPED | OUTPATIENT
Start: 2019-11-06 | End: 2019-12-12 | Stop reason: SDUPTHER

## 2019-11-18 RX ORDER — CETIRIZINE HYDROCHLORIDE 10 MG/1
TABLET ORAL
Qty: 30 TABLET | Refills: 1 | Status: SHIPPED | OUTPATIENT
Start: 2019-11-18 | End: 2020-01-22

## 2019-12-12 RX ORDER — TRAZODONE HYDROCHLORIDE 150 MG/1
TABLET ORAL
Qty: 30 TABLET | Refills: 1 | Status: SHIPPED | OUTPATIENT
Start: 2019-12-12 | End: 2020-03-03

## 2020-01-06 DIAGNOSIS — E78.2 MIXED HYPERLIPIDEMIA: ICD-10-CM

## 2020-01-06 RX ORDER — PITAVASTATIN CALCIUM 2.09 MG/1
TABLET, FILM COATED ORAL
Qty: 30 TABLET | Refills: 2 | Status: SHIPPED | OUTPATIENT
Start: 2020-01-06 | End: 2020-04-15

## 2020-01-22 RX ORDER — CETIRIZINE HYDROCHLORIDE 10 MG/1
TABLET ORAL
Qty: 30 TABLET | Refills: 5 | Status: SHIPPED | OUTPATIENT
Start: 2020-01-22 | End: 2020-07-27

## 2020-03-03 RX ORDER — TRAZODONE HYDROCHLORIDE 150 MG/1
TABLET ORAL
Qty: 30 TABLET | Refills: 1 | Status: SHIPPED | OUTPATIENT
Start: 2020-03-03 | End: 2020-05-22 | Stop reason: SDUPTHER

## 2020-03-24 DIAGNOSIS — I10 ESSENTIAL HYPERTENSION: ICD-10-CM

## 2020-03-25 RX ORDER — LOSARTAN POTASSIUM 50 MG/1
50 TABLET ORAL DAILY
Qty: 30 TABLET | Refills: 0 | Status: SHIPPED | OUTPATIENT
Start: 2020-03-25 | End: 2020-04-30 | Stop reason: SDUPTHER

## 2020-04-15 DIAGNOSIS — E78.2 MIXED HYPERLIPIDEMIA: ICD-10-CM

## 2020-04-15 RX ORDER — PITAVASTATIN CALCIUM 2.09 MG/1
TABLET, FILM COATED ORAL
Qty: 30 TABLET | Refills: 2 | Status: SHIPPED | OUTPATIENT
Start: 2020-04-15 | End: 2020-05-08 | Stop reason: SDUPTHER

## 2020-04-27 RX ORDER — TRAZODONE HYDROCHLORIDE 150 MG/1
TABLET ORAL
Qty: 30 TABLET | Refills: 1 | OUTPATIENT
Start: 2020-04-27

## 2020-04-30 ENCOUNTER — TELEPHONE (OUTPATIENT)
Dept: FAMILY MEDICINE CLINIC | Facility: CLINIC | Age: 60
End: 2020-04-30

## 2020-04-30 DIAGNOSIS — I10 ESSENTIAL HYPERTENSION: ICD-10-CM

## 2020-04-30 RX ORDER — LOSARTAN POTASSIUM 50 MG/1
50 TABLET ORAL DAILY
Qty: 15 TABLET | Refills: 0 | Status: SHIPPED | OUTPATIENT
Start: 2020-04-30 | End: 2020-05-08 | Stop reason: SDUPTHER

## 2020-04-30 NOTE — TELEPHONE ENCOUNTER
Short supply sent in. She will need appt before further refills. Patient notified again and added notes onto prescription.

## 2020-04-30 NOTE — TELEPHONE ENCOUNTER
LOSARTAN 50 MG, TAKE 1 PER DAY, 30 DAY SUPPLY    BP YESTERDAY /86    ONLY HAS 1 TAB LEFT    [facility] [address]

## 2020-05-08 ENCOUNTER — APPOINTMENT (OUTPATIENT)
Dept: LAB | Facility: HOSPITAL | Age: 60
End: 2020-05-08

## 2020-05-08 ENCOUNTER — OFFICE VISIT (OUTPATIENT)
Dept: CARDIOLOGY | Facility: CLINIC | Age: 60
End: 2020-05-08

## 2020-05-08 VITALS
DIASTOLIC BLOOD PRESSURE: 80 MMHG | WEIGHT: 167 LBS | TEMPERATURE: 97.6 F | BODY MASS INDEX: 30.73 KG/M2 | SYSTOLIC BLOOD PRESSURE: 128 MMHG | HEIGHT: 62 IN | HEART RATE: 68 BPM | OXYGEN SATURATION: 98 %

## 2020-05-08 DIAGNOSIS — E78.2 MIXED HYPERLIPIDEMIA: ICD-10-CM

## 2020-05-08 DIAGNOSIS — R07.2 PRECORDIAL PAIN: ICD-10-CM

## 2020-05-08 DIAGNOSIS — I10 ESSENTIAL HYPERTENSION: ICD-10-CM

## 2020-05-08 DIAGNOSIS — I70.0 ATHEROSCLEROSIS OF AORTA (HCC): ICD-10-CM

## 2020-05-08 DIAGNOSIS — I10 ESSENTIAL HYPERTENSION: Primary | ICD-10-CM

## 2020-05-08 LAB
ALBUMIN SERPL-MCNC: 4.2 G/DL (ref 3.5–5.2)
ALBUMIN/GLOB SERPL: 1.4 G/DL
ALP SERPL-CCNC: 75 U/L (ref 39–117)
ALT SERPL W P-5'-P-CCNC: 15 U/L (ref 1–33)
ANION GAP SERPL CALCULATED.3IONS-SCNC: 10 MMOL/L (ref 5–15)
AST SERPL-CCNC: 21 U/L (ref 1–32)
BILIRUB SERPL-MCNC: 0.8 MG/DL (ref 0.2–1.2)
BUN BLD-MCNC: 14 MG/DL (ref 8–23)
BUN/CREAT SERPL: 12.5 (ref 7–25)
CALCIUM SPEC-SCNC: 10.5 MG/DL (ref 8.6–10.5)
CHLORIDE SERPL-SCNC: 100 MMOL/L (ref 98–107)
CHOLEST SERPL-MCNC: 204 MG/DL (ref 0–200)
CO2 SERPL-SCNC: 29 MMOL/L (ref 22–29)
CREAT BLD-MCNC: 1.12 MG/DL (ref 0.57–1)
GFR SERPL CREATININE-BSD FRML MDRD: 50 ML/MIN/1.73
GLOBULIN UR ELPH-MCNC: 3 GM/DL
GLUCOSE BLD-MCNC: 100 MG/DL (ref 65–99)
HDLC SERPL-MCNC: 54 MG/DL (ref 40–60)
LDLC SERPL CALC-MCNC: 128 MG/DL (ref 0–100)
LDLC/HDLC SERPL: 2.37 {RATIO}
POTASSIUM BLD-SCNC: 4.1 MMOL/L (ref 3.5–5.2)
PROT SERPL-MCNC: 7.2 G/DL (ref 6–8.5)
SODIUM BLD-SCNC: 139 MMOL/L (ref 136–145)
TRIGL SERPL-MCNC: 111 MG/DL (ref 0–150)
VLDLC SERPL-MCNC: 22.2 MG/DL (ref 5–40)

## 2020-05-08 PROCEDURE — 80053 COMPREHEN METABOLIC PANEL: CPT | Performed by: INTERNAL MEDICINE

## 2020-05-08 PROCEDURE — 36415 COLL VENOUS BLD VENIPUNCTURE: CPT | Performed by: INTERNAL MEDICINE

## 2020-05-08 PROCEDURE — 99214 OFFICE O/P EST MOD 30 MIN: CPT | Performed by: INTERNAL MEDICINE

## 2020-05-08 PROCEDURE — 80061 LIPID PANEL: CPT | Performed by: INTERNAL MEDICINE

## 2020-05-08 RX ORDER — LOSARTAN POTASSIUM 50 MG/1
50 TABLET ORAL DAILY
Qty: 90 TABLET | Refills: 1 | Status: SHIPPED | OUTPATIENT
Start: 2020-05-08 | End: 2020-05-29

## 2020-05-08 RX ORDER — LOSARTAN POTASSIUM 50 MG/1
TABLET ORAL
Qty: 15 TABLET | Refills: 0 | OUTPATIENT
Start: 2020-05-08

## 2020-05-08 NOTE — PROGRESS NOTES
"[facility]  Office visit  Bianka Fisher  1960  [phone]    VISIT DATE:  5/8/2020      PCP: David Mckeon DO  [address] [facility] [address]    CC:  Chief Complaint   Patient presents with   • Hypertension   • Hyperlipidemia       ASSESSMENT:   Diagnosis Plan   1. Essential hypertension     2. Atherosclerosis of aorta (CMS/HCC)     3. Mixed hyperlipidemia         PLAN:  Hyperlipidemia: Continue pitavastatin 2 mg p.o. daily, goal LDL less than 100, intolerance to multiple other statins.  Reasonable control currently.  Repeat lipid profile pending  Hypertension: Continue propranolol(predominantly being used for essential tremor). Continue losartan 50 mg p.o. daily, goal blood pressure less than 130/80  Atypical angina: Recommending repeat ischemia evaluation with exercise myocardial perfusion imaging.  If negative would recommend potential GI evaluation.    Subjective  Blood pressures probably running less than 140/90 mmHg..  Currently deniesdyspnea or palpitations.  She appears compliant with medical therapy.  Denies myalgias on statin.  Reviewed most recent fasting lipid panel.  Episodes of precordial chest pain are increasing in frequency and severity.  Describes sudden onset moderate severe chest discomfort which caused her to clutch her chest.  Usually occurs at rest.  No obvious triggers.  Happening almost a daily basis.  Also having some diffuse abdominal discomfort.  Appears to have some generalized underlying anxiety.  Developed orthostasis on the combination of losartan hydrochlorothiazide, resolved with down titration.    PHYSICAL EXAMINATION:  Vitals:    05/08/20 1053   BP: 128/80   BP Location: Right arm   Patient Position: Sitting   Pulse: 68   Temp: 97.6 °F (36.4 °C)   SpO2: 98%   Weight: 75.8 kg (167 lb)   Height: 157.5 cm (62\")     General Appearance:    Alert, cooperative, no distress, appears stated age   Head:    Normocephalic, without obvious " abnormality, atraumatic   Eyes:    conjunctiva/corneas clear   Nose:   Nares normal, septum midline, mucosa normal, no drainage   Throat:   Lips, teeth and gums normal   Neck:   Supple, symmetrical, trachea midline, no carotid    bruit or JVD   Lungs:     Clear to auscultation bilaterally, respirations unlabored   Chest Wall:    No tenderness or deformity    Heart:    Regular rate and rhythm, S1 and S2 normal, no murmur, rub   or gallop, normal carotid impulse bilaterally without bruit.   Abdomen:     Soft, non-tender   Extremities:   Extremities normal, atraumatic, no cyanosis or edema   Pulses:   2+ and symmetric all extremities   Skin:   Skin color, texture, turgor normal, no rashes or lesions       Diagnostic Data:  Procedures  Lab Results   Component Value Date    TRIG 80 05/02/2019    HDL 45 05/02/2019     Lab Results   Component Value Date    GLUCOSE 95 08/02/2019    BUN 13 08/02/2019    CREATININE 1.17 (H) 08/02/2019     08/02/2019    K 4.1 08/02/2019     08/02/2019    CO2 29.3 (H) 08/02/2019     Lab Results   Component Value Date    HGBA1C 5.90 (H) 11/12/2018     Lab Results   Component Value Date    WBC 8.95 11/12/2018    HGB 12.2 11/12/2018    HCT 38.6 11/12/2018     11/12/2018       Allergies  Allergies   Allergen Reactions   • Crestor [Rosuvastatin Calcium] Hives       Current Medications    Current Outpatient Medications:   •  cetirizine (zyrTEC) 10 MG tablet, TAKE 1 TABLET BY MOUTH EVERY DAY, Disp: 30 tablet, Rfl: 5  •  LIVALO 2 MG tablet tablet, TAKE 1 TABLET BY MOUTH EVERY DAY EVERY NIGHT, Disp: 30 tablet, Rfl: 2  •  losartan (COZAAR) 50 MG tablet, Take 1 tablet by mouth Daily. Pt needs appointment for further refills, Disp: 15 tablet, Rfl: 0  •  Multiple Vitamin (MULTI-VITAMIN DAILY PO), Take  by mouth Daily., Disp: , Rfl:   •  propranolol LA (INDERAL LA) 60 MG 24 hr capsule, Take 1 capsule by mouth Daily., Disp: 90 capsule, Rfl: 3  •  traZODone (DESYREL) 150 MG tablet, TAKE 1  TABLET BY MOUTH EVERY NIGHT, Disp: 30 tablet, Rfl: 1          ROS  Review of Systems   Cardiovascular: Positive for chest pain and leg swelling. Negative for dyspnea on exertion and palpitations.   Respiratory: Negative for cough, shortness of breath, snoring and wheezing.    Neurological: Positive for dizziness.   Allergic/Immunologic: Positive for environmental allergies.       SOCIAL HX  Social History     Socioeconomic History   • Marital status:      Spouse name: Not on file   • Number of children: Not on file   • Years of education: Not on file   • Highest education level: Not on file   Tobacco Use   • Smoking status: Never Smoker   • Smokeless tobacco: Never Used   Substance and Sexual Activity   • Alcohol use: No   • Drug use: No     Comment: quit when pt was in 20's   • Sexual activity: Defer     Partners: Male       FAMILY HX  Family History   Problem Relation Age of Onset   • Lung cancer Mother    • Cancer Mother    • Skin cancer Sister    • Alcohol abuse Sister    • Alcohol abuse Brother    • Dementia Maternal Aunt    • Alzheimer's disease Maternal Uncle    • Cancer Maternal Uncle    • Mental illness Maternal Uncle    • Alzheimer's disease Paternal Aunt    • Cancer Maternal Grandmother    • Heart disease Maternal Grandfather    • Breast cancer Neg Hx    • Endometrial cancer Neg Hx    • Ovarian cancer Neg Hx              Valentino Bates III, MD, FACC

## 2020-05-13 ENCOUNTER — TELEPHONE (OUTPATIENT)
Dept: CARDIOLOGY | Facility: CLINIC | Age: 60
End: 2020-05-13

## 2020-05-13 NOTE — TELEPHONE ENCOUNTER
----- Message from Valentino Bates III, MD sent at 5/13/2020  9:02 AM EDT -----  Regarding: FW: Test Results Question  Contact: [phone]      ----- Message -----  From: Audrey Bernstein RN  Sent: 5/13/2020   8:31 AM EDT  To: Valentino Bates III, MD  Subject: FW: Test Results Question                            ----- Message -----  From: Bianka Fisher  Sent: 5/12/2020   6:32 PM EDT  To: Abhishek Dobbins [facility]  Subject: Test Results Question                            I see my test results from labwork. What do these results mean for me   Does it require medication change?

## 2020-05-13 NOTE — TELEPHONE ENCOUNTER
Valentino Bates III, MD  You 1 hour ago (9:02 AM)         Electrolytes look good, kidney function is stable. Your bad cholesterol has trended upward slightly but so has your good cholesterol. Would increase your pitavastatin to 4mg po daily.         Documentation          Called patient to report message above from . No answer. Left voice message to call our office.

## 2020-05-22 ENCOUNTER — LAB (OUTPATIENT)
Dept: LAB | Facility: HOSPITAL | Age: 60
End: 2020-05-22

## 2020-05-22 ENCOUNTER — OFFICE VISIT (OUTPATIENT)
Dept: FAMILY MEDICINE CLINIC | Facility: CLINIC | Age: 60
End: 2020-05-22

## 2020-05-22 VITALS
HEIGHT: 62 IN | SYSTOLIC BLOOD PRESSURE: 132 MMHG | DIASTOLIC BLOOD PRESSURE: 70 MMHG | HEART RATE: 52 BPM | WEIGHT: 166 LBS | BODY MASS INDEX: 30.55 KG/M2

## 2020-05-22 DIAGNOSIS — E55.9 VITAMIN D DEFICIENCY: ICD-10-CM

## 2020-05-22 DIAGNOSIS — I10 ESSENTIAL HYPERTENSION: Primary | ICD-10-CM

## 2020-05-22 DIAGNOSIS — I95.1 ORTHOSTATIC HYPOTENSION: ICD-10-CM

## 2020-05-22 DIAGNOSIS — D64.9 ANEMIA, UNSPECIFIED TYPE: ICD-10-CM

## 2020-05-22 DIAGNOSIS — R73.9 HYPERGLYCEMIA: ICD-10-CM

## 2020-05-22 DIAGNOSIS — H92.03 EAR PAIN, BILATERAL: ICD-10-CM

## 2020-05-22 DIAGNOSIS — H91.93 BILATERAL HEARING LOSS, UNSPECIFIED HEARING LOSS TYPE: ICD-10-CM

## 2020-05-22 DIAGNOSIS — E66.09 CLASS 1 OBESITY DUE TO EXCESS CALORIES WITH SERIOUS COMORBIDITY AND BODY MASS INDEX (BMI) OF 31.0 TO 31.9 IN ADULT: ICD-10-CM

## 2020-05-22 DIAGNOSIS — R53.83 FATIGUE, UNSPECIFIED TYPE: ICD-10-CM

## 2020-05-22 DIAGNOSIS — R79.89 ELEVATED SERUM CREATININE: ICD-10-CM

## 2020-05-22 DIAGNOSIS — F51.01 PRIMARY INSOMNIA: ICD-10-CM

## 2020-05-22 LAB
25(OH)D3 SERPL-MCNC: 27.1 NG/ML (ref 30–100)
ANION GAP SERPL CALCULATED.3IONS-SCNC: 8.9 MMOL/L (ref 5–15)
BACTERIA UR QL AUTO: ABNORMAL /HPF
BILIRUB UR QL STRIP: NEGATIVE
BUN BLD-MCNC: 8 MG/DL (ref 8–23)
BUN/CREAT SERPL: 7.9 (ref 7–25)
CALCIUM SPEC-SCNC: 10 MG/DL (ref 8.6–10.5)
CHLORIDE SERPL-SCNC: 105 MMOL/L (ref 98–107)
CLARITY UR: CLEAR
CO2 SERPL-SCNC: 27.1 MMOL/L (ref 22–29)
COD CRY URNS QL: ABNORMAL /HPF
COLOR UR: YELLOW
CREAT BLD-MCNC: 1.01 MG/DL (ref 0.57–1)
CREAT UR-MCNC: 160.3 MG/DL
DEPRECATED RDW RBC AUTO: 47.3 FL (ref 37–54)
ERYTHROCYTE [DISTWIDTH] IN BLOOD BY AUTOMATED COUNT: 15 % (ref 12.3–15.4)
FERRITIN SERPL-MCNC: 45.8 NG/ML (ref 13–150)
FOLATE SERPL-MCNC: >20 NG/ML (ref 4.78–24.2)
GFR SERPL CREATININE-BSD FRML MDRD: 56 ML/MIN/1.73
GLUCOSE BLD-MCNC: 97 MG/DL (ref 65–99)
GLUCOSE UR STRIP-MCNC: NEGATIVE MG/DL
HBA1C MFR BLD: 5.8 % (ref 4.8–5.6)
HCT VFR BLD AUTO: 38.8 % (ref 34–46.6)
HGB BLD-MCNC: 12.6 G/DL (ref 12–15.9)
HGB UR QL STRIP.AUTO: NEGATIVE
HYALINE CASTS UR QL AUTO: ABNORMAL /LPF
IRON 24H UR-MRATE: 64 MCG/DL (ref 37–145)
IRON SATN MFR SERPL: 18 % (ref 20–50)
KETONES UR QL STRIP: NEGATIVE
LEUKOCYTE ESTERASE UR QL STRIP.AUTO: ABNORMAL
MCH RBC QN AUTO: 27.9 PG (ref 26.6–33)
MCHC RBC AUTO-ENTMCNC: 32.5 G/DL (ref 31.5–35.7)
MCV RBC AUTO: 85.8 FL (ref 79–97)
NITRITE UR QL STRIP: NEGATIVE
OSMOLALITY UR: 341 MOSM/KG
PATHOLOGY REVIEW: YES
PH UR STRIP.AUTO: <=5 [PH] (ref 5–8)
PLATELET # BLD AUTO: 150 10*3/MM3 (ref 140–450)
PMV BLD AUTO: 11.4 FL (ref 6–12)
POTASSIUM BLD-SCNC: 4 MMOL/L (ref 3.5–5.2)
PROT UR QL STRIP: NEGATIVE
RBC # BLD AUTO: 4.52 10*6/MM3 (ref 3.77–5.28)
RBC # UR: ABNORMAL /HPF
REF LAB TEST METHOD: ABNORMAL
RETICS # AUTO: 0.08 10*6/MM3 (ref 0.02–0.13)
RETICS/RBC NFR AUTO: 1.79 % (ref 0.7–1.9)
SODIUM BLD-SCNC: 141 MMOL/L (ref 136–145)
SODIUM UR-SCNC: 32 MMOL/L
SP GR UR STRIP: 1.01 (ref 1–1.03)
SQUAMOUS #/AREA URNS HPF: ABNORMAL /HPF
TIBC SERPL-MCNC: 362 MCG/DL (ref 298–536)
TRANSFERRIN SERPL-MCNC: 243 MG/DL (ref 200–360)
UROBILINOGEN UR QL STRIP: ABNORMAL
VIT B12 BLD-MCNC: 369 PG/ML (ref 211–946)
WBC NRBC COR # BLD: 6.56 10*3/MM3 (ref 3.4–10.8)
WBC UR QL AUTO: ABNORMAL /HPF

## 2020-05-22 PROCEDURE — 85027 COMPLETE CBC AUTOMATED: CPT

## 2020-05-22 PROCEDURE — 83935 ASSAY OF URINE OSMOLALITY: CPT

## 2020-05-22 PROCEDURE — 85045 AUTOMATED RETICULOCYTE COUNT: CPT

## 2020-05-22 PROCEDURE — 83540 ASSAY OF IRON: CPT

## 2020-05-22 PROCEDURE — 80048 BASIC METABOLIC PNL TOTAL CA: CPT

## 2020-05-22 PROCEDURE — 82746 ASSAY OF FOLIC ACID SERUM: CPT

## 2020-05-22 PROCEDURE — 82728 ASSAY OF FERRITIN: CPT

## 2020-05-22 PROCEDURE — 83036 HEMOGLOBIN GLYCOSYLATED A1C: CPT

## 2020-05-22 PROCEDURE — 81001 URINALYSIS AUTO W/SCOPE: CPT

## 2020-05-22 PROCEDURE — 82306 VITAMIN D 25 HYDROXY: CPT

## 2020-05-22 PROCEDURE — 99214 OFFICE O/P EST MOD 30 MIN: CPT | Performed by: FAMILY MEDICINE

## 2020-05-22 PROCEDURE — 82607 VITAMIN B-12: CPT

## 2020-05-22 PROCEDURE — 84300 ASSAY OF URINE SODIUM: CPT

## 2020-05-22 PROCEDURE — 82570 ASSAY OF URINE CREATININE: CPT

## 2020-05-22 PROCEDURE — 84466 ASSAY OF TRANSFERRIN: CPT

## 2020-05-22 RX ORDER — TRAZODONE HYDROCHLORIDE 50 MG/1
TABLET ORAL
Qty: 42 TABLET | Refills: 0 | Status: SHIPPED | OUTPATIENT
Start: 2020-05-22 | End: 2020-06-16 | Stop reason: SDUPTHER

## 2020-05-22 NOTE — PROGRESS NOTES
Subjective   Bianka Fisher is a 60 y.o. female.     Chief Complaint   Patient presents with   • Med Refill   • Hearing Loss     bilateral ear pain - severe x a few years        History of Present Illness     Bianka Fisher presents today for follow-up obesity and hyperlipidemia.  She was started on phentermine 15 mg capsules in February 2019.  Initially did quite well with this, lost 15 home scale.  Since then her weight has stagnated around 162. She admits she does not exercise regularly. She has also started using CBD oil tincture over the last year.  She was last seen here in August 2019.  At that point she had an additional complaint of getting lightheaded on standing, lasts about 15 seconds and resolves. Low water intake. This has resolved since stopping HCTZ.    She saw her cardiologist Dr. Bates earlier this month and had a repeat lipid profile and metabolic panel.  Her metabolic panel showed persistent elevated creatinine.  Likely secondary to chronic losartan.    Has been told blood counts are too low to give blood many times over the last year. Has not mentioned it because she assumed she would have been told, but has not had a CBC since 2018 when it was normal.    She reports bilateral ear pain and hearing loss that is been quite severe for the last few years. Most severe deep in left year.    The following portions of the patient's history were reviewed and updated as appropriate: allergies, current medications, past family history, past medical history, past social history, past surgical history and problem list.    Active Ambulatory Problems     Diagnosis Date Noted   • Precordial pain 08/02/2017   • Mixed hyperlipidemia 08/02/2017   • Claudication (CMS/[facility]) 08/02/2017   • Atherosclerosis of aorta (CMS/[facility]) 08/02/2017   • Essential hypertension 09/06/2017   • GERD (gastroesophageal reflux disease) 11/12/2018   • Tremor, essential 11/12/2018   • Memory loss 12/12/2018     Resolved Ambulatory Problems  "    Diagnosis Date Noted   • No Resolved Ambulatory Problems     Past Medical History:   Diagnosis Date   • Anxiety    • Asthma    • Heart murmur    • Hyperlipidemia    • Hypertension    • Insomnia    • Migraine    • Peripheral neuropathy    • Skin cancer    • Tremors of nervous system      Past Surgical History:   Procedure Laterality Date   • BREAST BIOPSY Right 2017   •  SECTION     • CHOLECYSTECTOMY     • COLONOSCOPY     • TONSILLECTOMY     • TONSILLECTOMY       Family History   Problem Relation Age of Onset   • Lung cancer Mother    • Cancer Mother    • Skin cancer Sister    • Alcohol abuse Sister    • Alcohol abuse Brother    • Dementia Maternal Aunt    • Alzheimer's disease Maternal Uncle    • Cancer Maternal Uncle    • Mental illness Maternal Uncle    • Alzheimer's disease Paternal Aunt    • Cancer Maternal Grandmother    • Heart disease Maternal Grandfather    • Breast cancer Neg Hx    • Endometrial cancer Neg Hx    • Ovarian cancer Neg Hx      Social History     Socioeconomic History   • Marital status:      Spouse name: Not on file   • Number of children: Not on file   • Years of education: Not on file   • Highest education level: Not on file   Tobacco Use   • Smoking status: Never Smoker   • Smokeless tobacco: Never Used   Substance and Sexual Activity   • Alcohol use: No   • Drug use: No     Comment: quit when pt was in 20's   • Sexual activity: Defer     Partners: Male         Review of Systems   Constitutional: Negative.    Respiratory: Negative.    Cardiovascular: Negative.    Neurological: Positive for light-headedness.       Objective   Blood pressure 132/70, pulse 52, height 157.5 cm (62\"), weight 75.3 kg (166 lb), last menstrual period 2016, not currently breastfeeding.  Nursing note reviewed  Physical Exam  Const: NAD, A&Ox4, Pleasant, Cooperative  Eyes: EOMI, no conjunctivitis  ENT: No nasal discharge present, neck supple  Cardiac: Regular rate and rhythm, no " cyanosis  Resp: Respiratory rate within normal limits, no increased work of breathing, no audible wheezing or retractions noted  GI: No distention or ascites  MSK: Motor and sensation grossly intact in bilateral upper extremities  Neurologic: CN II-XII grossly intact  Psych: Appropriate mood and behavior.  Skin: Pink, warm, dry  Procedures  Assessment/Plan   Bianka was seen today for med refill and hearing loss.    Diagnoses and all orders for this visit:    Essential hypertension    Class 1 obesity due to excess calories with serious comorbidity and body mass index (BMI) of 31.0 to 31.9 in adult    Orthostatic hypotension  Comments:  Resolved    Bilateral hearing loss, unspecified hearing loss type  -     Ambulatory Referral to ENT (Otolaryngology)    Hyperglycemia  Comments:  5.9% 2018  Orders:  -     Hemoglobin A1c; Future    Fatigue, unspecified type  -     Vitamin B12; Future  -     Urinalysis With Microscopic If Indicated (No Culture) - Urine, Clean Catch; Future  -     Sodium, Urine, Random - Urine, Clean Catch; Future  -     Creatinine, Urine, Random - Urine, Clean Catch; Future  -     Osmolality, Urine - Urine, Clean Catch; Future  -     Peripheral Blood Smear; Future  -     Basic Metabolic Panel; Future  -     Reticulocytes; Future  -     CBC (No Diff); Future  -     Folate; Future  -     Ferritin; Future  -     Iron Profile; Future    Vitamin D deficiency  -     Vitamin D 25 Hydroxy; Future    Anemia, unspecified type  -     Vitamin B12; Future  -     Peripheral Blood Smear; Future  -     Basic Metabolic Panel; Future  -     Reticulocytes; Future  -     CBC (No Diff); Future  -     Folate; Future  -     Ferritin; Future  -     Iron Profile; Future    Elevated serum creatinine  -     Vitamin D 25 Hydroxy; Future  -     Urinalysis With Microscopic If Indicated (No Culture) - Urine, Clean Catch; Future  -     Sodium, Urine, Random - Urine, Clean Catch; Future  -     Creatinine, Urine, Random - Urine, Clean  Catch; Future  -     Osmolality, Urine - Urine, Clean Catch; Future  -     Basic Metabolic Panel; Future    Primary insomnia  -     traZODone (DESYREL) 50 MG tablet; Take 2 tablets by mouth Every Night for 14 days, THEN 1 tablet Every Night for 14 days.    Ear pain, bilateral  -     Ambulatory Referral to ENT (Otolaryngology)        #1 hypertension  Stable, at goal.  Continue losartan 50 mg daily  Continue propranolol LA 60 mg daily    #2 hyperlipidemia  Continue pitavastatin 2mg  Recheck lipids today    #3 atherosclerosis of aorta  Stable per cardiology    #4  Obesity  Continue phentermine 15 mg daily  This patient is on a controlled substance which improves symptoms/quality of life and is aware of the risks, benefits and possible side-effects current treatment. The patient denies any medication side-effects at this time. A controlled substance agreement will be obtained or is currently on file. We reviewed required monitoring for controlled substances including but not limited to quarterly follow-up visits, annual depression screening, and urine drug screens to which the patient is agreeable. A MICHELA report has been or shortly will be reviewed. There are no signs of deviation or misuse.  -weight loss recs discussed as below  -ref to nutrition      Patient Instructions   1.  Labs today to check anemia and kidney function.    2.  May need to increase blood pressure medication.    Dr. Mckeon' Weight Loss Tips and Recommendations:    Most important tips to remember  1) Track your food and weight every day. It keeps you accountable to yourself and gives an accurate picture.  2) Exercise of 30 minutes daily is important, but it can be undone quickly with diet. Running 3 miles burns about the number of calories in 1 (one) chocolate chip cookie.  3) Stop drinking calories. This is the number one source of weight gain. Juice is as bad as soda. One 20oz bottle of pop is 250 calories. Drinking one daily equals roughly 25  "pounds of weight gain every year just in pop. Our bodies also don't recognize liquid calories well--our hunger mechanism is entirely unlinked from our thirst mechanism. Stick with water.  4) Weight loss will be slow. The changes you make will feel small, and the journey will seem interminable. A mantra that I find helpful: \"At the end of the day I am just an ordinary person who does a whole bunch of tiny, ordinary things that together are extraordinary.\"  5) Don't link your food to what others eat. Someone who weighs 180 pounds who loses 30 pounds will need to eat 20% fewer calories to stay at that weight than someone who weighed 150 ponds all along. This never goes away, and is why childhood and adolescent health is so important. 20% may not sound like much, but that's the difference between staying that weight and rebounding, and informs why fad diets so often fail--whatever you did to lose the weight, you have to do to keep it off.  6) At the end of the day, it comes down to: Eat a little less, and move a little more. That's it.    Media for Motivation  Movies:  • Janell Runs a Marathon (Amazon Prime Video)  • The Weight of the Nation (ANSHUL, Ivett, also in 4 parts on YouTube)    Books:  • Born to Run - by Izaiah Gonzalez  • The Terrible and Wonderful Reasons Why I Run Long Distances - by Anant Coe (Also online at [email or web address])    Other tips  • Set discrete, achievable, actionable goals. This means focusing on specific successes that are within your control, rather than short-term results.  Just remember, that if you do the right things, do them consistently, and do them for the long-term, you will lose weight.  Make sure your goals are \"ADA\" compliant--Achievable, Discrete, and Actionable:  o Achievable: It is important to be realistic.  Setting unrealistic expectations not only setting up for failure, but can even be unhealthy.  Start small with changes that will be sustainable " "over the long-term.  Remember, how you eat and exercise to get to a certain weight is how you'll need to eat and exercise to stay at that weight.  o Discrete: Rather than say \"I want to eat better\", which is vague and noncommittal, make your goal something like \"I want to eat 3 servings of vegetables every day\" or \"I want to keep every meal below 600 elvira\"  o Actionable: Actual weight makes small fluctuations on a daily basis, impacted by things such as water retention or colonic waste retention.  For the most part, these things are out of our control.  It can be easy to do everything right but then have a little fluid retention or constipation, and suddenly despite all your efforts your weight remains the same. So instead of trying to lose say 2 pounds per week, make your goals only dependent on what you do--\"I want to walk 10,000 steps per day\" or \"I want to eat below 1500 elvira per day.\"  That way you can have weekly successes and don't get to down on yourself for things that you don't control.  • For weight loss, weigh yourself regularly, on an empty stomach in the morning (or at least not within 8 hours of a heavy meal). It is also helpful to weigh yourself in the same clothes every time -- this might even include for every time you come to see me! Write down or track your weight somehow to maintain accountability. It may also be helpful to track how your clothes fit and specific measurements of waist circumference or thigh circumference.  • Try to keep a log of your food and eat 500-700 calories fewer than your calculated needs per day. Use a notebook or jane (see apps below) If you stick to this amount, regardless of what the scale fluctuates to, I can guarantee that you will lose weight in the form of fat. One pound of fat is approximately 3500 calories, which means cutting 500 calories per day will result in about a pound of fat loss. Continue to try to exercise and walk as much as possible.   • Fidgeting, as " much as we're taught not to, can burn an extra 100-200 calories per day as well!   • If you stress-eat and occasionally eat impulsively, always make sure to keep a healthy snack and water on hand for when these impulses strike. A handful of almonds and 8 ounces of water are a great low-calorie, high-protein combination that controls hunger well.  • In general, when you are eating, focus on eating--don't eat in bed, or while watching TV, or while at your desk. Engage with your food, and why you are eating it.  • If your diet includes salads, try for small changes such as swapping spinach or kale for margarito or iceberg lettuce. Try to choose dressings that are either low in fat or that have a high ratio of unsaturated to saturated fats. These include ones with olive oil rather than soybean oil bases.    Useful Apps  • [employer]Pal (best overall for tracking- includes food database, barcode scanning, weight tracking with photo capability, and can pull data from other apps and devices)  • LoseIt  • HealthMate ([employer] Device tracking)  • Cash Check Card Connect    Helpful Websites  https://www.[employer]/fitness/resources/weight-loss-calculator       (How much do I have to do to lose weight by a certain date?)  http://www.calculator.net/calorie-calculator.html          (Caloric Need Calculator)  https://www.cdc.gov/healthyweight/assessing/bmi/adult_bmi/english_bmi_calculator/bmi_calculator.html  (BMI Calculator + Ideal Weight)    OTC Meds for Weight Loss  • Kemar - $65 for 30-day supply (120-ct. 60mg capsules) - Available at [employer] and [facility] drug stores  o A reduced-dosage version of the prescription medication Orlistat (Xenical). Taken 1 capsule 3-4 times daily with food. It works by stopping an enzyme that digests fat, decreasing the amount of fat (and calories) your body absorbs from food. Side effects go along with undigested fat and include bloating, gas, greasy/oily stools. Weight loss is modest and achieved  by calorie absorption reduction as well as diet modification through aversion of side effects (to put a blunt point on it--no one wants to poop their pants with pizza grease, so you avoid the foods that do that to you). I recommend liver enzyme monitoring if taking.  • I do not recommend any other OTC products or supplements for weight loss including stimulants      Return in about 1 week (around 5/29/2020) for video visit or in person for lab review.    Ambulatory progress note signed and attested to by David Mckeon D.O.

## 2020-05-22 NOTE — PATIENT INSTRUCTIONS
"1.  Labs today to check anemia and kidney function.    2.  May need to increase blood pressure medication.    Dr. Mckeon' Weight Loss Tips and Recommendations:    Most important tips to remember  1) Track your food and weight every day. It keeps you accountable to yourself and gives an accurate picture.  2) Exercise of 30 minutes daily is important, but it can be undone quickly with diet. Running 3 miles burns about the number of calories in 1 (one) chocolate chip cookie.  3) Stop drinking calories. This is the number one source of weight gain. Juice is as bad as soda. One 20oz bottle of pop is 250 calories. Drinking one daily equals roughly 25 pounds of weight gain every year just in pop. Our bodies also don't recognize liquid calories well--our hunger mechanism is entirely unlinked from our thirst mechanism. Stick with water.  4) Weight loss will be slow. The changes you make will feel small, and the journey will seem interminable. A mantra that I find helpful: \"At the end of the day I am just an ordinary person who does a whole bunch of tiny, ordinary things that together are extraordinary.\"  5) Don't link your food to what others eat. Someone who weighs 180 pounds who loses 30 pounds will need to eat 20% fewer calories to stay at that weight than someone who weighed 150 ponds all along. This never goes away, and is why childhood and adolescent health is so important. 20% may not sound like much, but that's the difference between staying that weight and rebounding, and informs why fad diets so often fail--whatever you did to lose the weight, you have to do to keep it off.  6) At the end of the day, it comes down to: Eat a little less, and move a little more. That's it.    Media for Motivation  Movies:  • Janell Runs a Marathon (Amazon Prime Video)  • The Weight of the Nation (HBO, Ivett, also in 4 parts on YouTube)    Books:  • Born to Run - by Izaiah Gonzalez  • The Terrible and Wonderful Reasons Why I Run " "Long Distances - by Anant Coe (Also online at https://Vozeeme/[employer]/running)    Other tips  • Set discrete, achievable, actionable goals. This means focusing on specific successes that are within your control, rather than short-term results.  Just remember, that if you do the right things, do them consistently, and do them for the long-term, you will lose weight.  Make sure your goals are \"ADA\" compliant--Achievable, Discrete, and Actionable:  o Achievable: It is important to be realistic.  Setting unrealistic expectations not only setting up for failure, but can even be unhealthy.  Start small with changes that will be sustainable over the long-term.  Remember, how you eat and exercise to get to a certain weight is how you'll need to eat and exercise to stay at that weight.  o Discrete: Rather than say \"I want to eat better\", which is vague and noncommittal, make your goal something like \"I want to eat 3 servings of vegetables every day\" or \"I want to keep every meal below 600 elvira\"  o Actionable: Actual weight makes small fluctuations on a daily basis, impacted by things such as water retention or colonic waste retention.  For the most part, these things are out of our control.  It can be easy to do everything right but then have a little fluid retention or constipation, and suddenly despite all your efforts your weight remains the same. So instead of trying to lose say 2 pounds per week, make your goals only dependent on what you do--\"I want to walk 10,000 steps per day\" or \"I want to eat below 1500 elvira per day.\"  That way you can have weekly successes and don't get to down on yourself for things that you don't control.  • For weight loss, weigh yourself regularly, on an empty stomach in the morning (or at least not within 8 hours of a heavy meal). It is also helpful to weigh yourself in the same clothes every time -- this might even include for every time you come to see me! Write down or track your " weight somehow to maintain accountability. It may also be helpful to track how your clothes fit and specific measurements of waist circumference or thigh circumference.  • Try to keep a log of your food and eat 500-700 calories fewer than your calculated needs per day. Use a notebook or jane (see apps below) If you stick to this amount, regardless of what the scale fluctuates to, I can guarantee that you will lose weight in the form of fat. One pound of fat is approximately 3500 calories, which means cutting 500 calories per day will result in about a pound of fat loss. Continue to try to exercise and walk as much as possible.   • Fidgeting, as much as we're taught not to, can burn an extra 100-200 calories per day as well!   • If you stress-eat and occasionally eat impulsively, always make sure to keep a healthy snack and water on hand for when these impulses strike. A handful of almonds and 8 ounces of water are a great low-calorie, high-protein combination that controls hunger well.  • In general, when you are eating, focus on eating--don't eat in bed, or while watching TV, or while at your desk. Engage with your food, and why you are eating it.  • If your diet includes salads, try for small changes such as swapping spinach or kale for margarito or iceberg lettuce. Try to choose dressings that are either low in fat or that have a high ratio of unsaturated to saturated fats. These include ones with olive oil rather than soybean oil bases.    Useful Apps  • MyFitnessPal (best overall for tracking- includes food database, barcode scanning, weight tracking with photo capability, and can pull data from other apps and devices)  • LoseIt  • HealthMate ([employer] Smart Device tracking)  • [employer] Connect    Helpful Websites  https://www.Isabella Oliver.[employer]/fitness/resources/weight-loss-calculator       (How much do I have to do to lose weight by a certain date?)  http://www.calculator.net/calorie-calculator.html           (Caloric Need Calculator)  https://www.cdc.gov/healthyweight/assessing/bmi/adult_bmi/english_bmi_calculator/bmi_calculator.html  (BMI Calculator + Ideal Weight)    OTC Meds for Weight Loss  • Kemar - $65 for 30-day supply (120-ct. 60mg capsules) - Available at [employer] and [employer] drug stores  o A reduced-dosage version of the prescription medication Orlistat (Xenical). Taken 1 capsule 3-4 times daily with food. It works by stopping an enzyme that digests fat, decreasing the amount of fat (and calories) your body absorbs from food. Side effects go along with undigested fat and include bloating, gas, greasy/oily stools. Weight loss is modest and achieved by calorie absorption reduction as well as diet modification through aversion of side effects (to put a blunt point on it--no one wants to poop their pants with pizza grease, so you avoid the foods that do that to you). I recommend liver enzyme monitoring if taking.  • I do not recommend any other OTC products or supplements for weight loss including stimulants

## 2020-05-26 ENCOUNTER — OFFICE VISIT (OUTPATIENT)
Dept: GASTROENTEROLOGY | Facility: CLINIC | Age: 60
End: 2020-05-26

## 2020-05-26 ENCOUNTER — APPOINTMENT (OUTPATIENT)
Dept: CARDIOLOGY | Facility: HOSPITAL | Age: 60
End: 2020-05-26

## 2020-05-26 VITALS
SYSTOLIC BLOOD PRESSURE: 108 MMHG | TEMPERATURE: 97.7 F | RESPIRATION RATE: 14 BRPM | BODY MASS INDEX: 30.8 KG/M2 | DIASTOLIC BLOOD PRESSURE: 70 MMHG | HEART RATE: 77 BPM | OXYGEN SATURATION: 97 % | HEIGHT: 62 IN | WEIGHT: 167.4 LBS

## 2020-05-26 DIAGNOSIS — R19.7 DIARRHEA, UNSPECIFIED TYPE: Primary | ICD-10-CM

## 2020-05-26 DIAGNOSIS — R10.9 ABDOMINAL PAIN, UNSPECIFIED ABDOMINAL LOCATION: ICD-10-CM

## 2020-05-26 DIAGNOSIS — Z80.0 FAMILY HISTORY OF COLON CANCER REQUIRING SCREENING COLONOSCOPY: ICD-10-CM

## 2020-05-26 LAB
LAB AP CASE REPORT: NORMAL
PATH REPORT.FINAL DX SPEC: NORMAL

## 2020-05-26 PROCEDURE — 99214 OFFICE O/P EST MOD 30 MIN: CPT | Performed by: INTERNAL MEDICINE

## 2020-05-26 NOTE — PROGRESS NOTES
Chief Complaint: Diarrhea and abdominal pain      PRUDENCIO Carlton is here today with complaints of abdominal pain and diarrhea.  Her symptoms date back to March.  She occasionally has some nocturnal stools.  There has been no antibiotic use over the last several months.  Over the last several months she is developed some crampy lower abdominal pain followed by times quite large volumes of loose stools.  She has had accidents with this.  She has 2-3 bowel movements per day.  None of it is been formed.  She is been doing this intermittently now for the last 3 months.  She does have a family history of colon cancer.  There is been no blood in her stool she is had no weight loss.  She has had no fever arthralgia or arthritis.  There is no family history of inflammatory bowel disease.  Story is significant for a cholecystectomy when she was in her 20s.  She has had problems with intermittent diarrhea and constipation since then.  She has been told in the past that she has had irritable bowel.  She was checked for celiac disease in the past.  Because of these complaints she is here today for evaluation.  With regards to her abdominal pain it is always associated with her bowel movements.  It is crampy and lower in location.  It eases when she has a bowel movement.  Is not interfered with her daily activities.    Past Medical History:   Past Medical History:   Diagnosis Date   • Anxiety    • Asthma    • GERD (gastroesophageal reflux disease)    • Heart murmur    • Hyperlipidemia    • Hypertension    • Insomnia    • Migraine    • Peripheral neuropathy    • Skin cancer    • Tremors of nervous system        Family History:  Family History   Problem Relation Age of Onset   • Lung cancer Mother    • Cancer Mother    • Skin cancer Sister    • Alcohol abuse Sister    • Alcohol abuse Brother    • Dementia Maternal Aunt    • Alzheimer's disease Maternal Uncle    • Cancer Maternal Uncle    • Mental illness Maternal Uncle    •  Alzheimer's disease Paternal Aunt    • Cancer Maternal Grandmother    • Heart disease Maternal Grandfather    • Breast cancer Neg Hx    • Endometrial cancer Neg Hx    • Ovarian cancer Neg Hx        Social History:   reports that she has never smoked. She has never used smokeless tobacco. She reports that she does not drink alcohol or use drugs.    Medications:     Current Outpatient Medications:   •  cetirizine (zyrTEC) 10 MG tablet, TAKE 1 TABLET BY MOUTH EVERY DAY, Disp: 30 tablet, Rfl: 5  •  losartan (COZAAR) 50 MG tablet, Take 1 tablet by mouth Daily. Pt needs appointment for further refills, Disp: 90 tablet, Rfl: 1  •  Multiple Vitamin (MULTI-VITAMIN DAILY PO), Take  by mouth Daily., Disp: , Rfl:   •  Pitavastatin Calcium 4 MG tablet, Take 1 tablet by mouth Every Night., Disp: 30 tablet, Rfl: 5  •  propranolol LA (INDERAL LA) 60 MG 24 hr capsule, Take 1 capsule by mouth Daily., Disp: 90 capsule, Rfl: 3  •  traZODone (DESYREL) 50 MG tablet, Take 2 tablets by mouth Every Night for 14 days, THEN 1 tablet Every Night for 14 days., Disp: 42 tablet, Rfl: 0    Allergies:  Crestor [rosuvastatin calcium]    Review of Systems   Gastrointestinal: Positive for abdominal pain and diarrhea.   All other systems reviewed and are negative.  Answers for HPI/ROS submitted by the patient on 5/25/2020   Have you had these symptoms before?: Yes  How long have you been having these symptoms?: Greater than 2 weeks  What is the primary reason for your visit?: Other    Conflicting answers have been found for some questions. Please document the patient's answers manually.  Reviewed with ROS and HPI with patient        Physical Exam:   Constitutional: Pt is oriented to person, place, and time and well-developed, well-nourished, and in no distress.   HENT: Mouth/Throat: Oropharynx is clear and moist.   Neck: Normal range of motion. Neck supple.   Cardiovascular: Normal rate, regular rhythm and normal heart sounds.    Pulmonary/Chest:  Effort normal and breath sounds normal. No respiratory distress. No  wheezes.   Abdominal: Soft. Bowel sounds are normal.   Skin: Skin is warm and dry.   Psychiatric: Mood, memory, affect and judgment normal.           Assessment and Plan Bianka has developed more frequent problems with diarrhea and abdominal pain.  This could be multifactorial being irritable bowel syndrome possibly related to postcholecystectomy or even microscopic colitis.  She is the right age and gender ethnicity for microscopic colitis.  All of this was discussed in detail spending at least 20 minutes besides the exam time going over this.  To start with I placed her on colestipol a gram twice a day.  I cautioned her not to use this medication within 1 to 2 hours of her other medications.  She is scheduled for her screening colonoscopy in July.  If her symptoms of improved biopsies will not be done at that time to exclude microscopic colitis.  We also spent some time talking about diet therapy if this is primarily irritable bowel including a celiac diet or FODMAP diet she is agreeable to the plan.  She was furnished with a prescription for the colestipol and was instructed how to take it.        ICD-10-CM ICD-9-CM   1. Diarrhea, unspecified type R19.7 787.91   2. Abdominal pain, unspecified abdominal location R10.9 789.00   3. Family history of colon cancer requiring screening colonoscopy Z80.0 V16.0     Trav Bob M.D.  [facility]     EMR Dragon/Transcription disclaimer:   Much of this encounter note is an electronic transcription/translation of spoken language to printed text. The electronic translation of spoken language may permit erroneous, or at times, nonsensical words or phrases to be inadvertently transcribed; Although I have reviewed the note for such errors, some may still exist.

## 2020-05-29 ENCOUNTER — TELEMEDICINE (OUTPATIENT)
Dept: FAMILY MEDICINE CLINIC | Facility: CLINIC | Age: 60
End: 2020-05-29

## 2020-05-29 DIAGNOSIS — R79.89 ELEVATED SERUM CREATININE: ICD-10-CM

## 2020-05-29 DIAGNOSIS — I10 ESSENTIAL HYPERTENSION: ICD-10-CM

## 2020-05-29 DIAGNOSIS — I10 ESSENTIAL HYPERTENSION: Primary | ICD-10-CM

## 2020-05-29 PROCEDURE — 99213 OFFICE O/P EST LOW 20 MIN: CPT | Performed by: FAMILY MEDICINE

## 2020-05-29 RX ORDER — LOSARTAN POTASSIUM 50 MG/1
25 TABLET ORAL DAILY
Qty: 90 TABLET | Refills: 1
Start: 2020-05-29 | End: 2020-06-16 | Stop reason: SDUPTHER

## 2020-05-29 NOTE — PATIENT INSTRUCTIONS
1.  Cut your losartan in half (25mg) for 2 weeks and then repeat labs    2.  Monitor blood pressure daily    3.  Start Vitamin D3 2,000 U daily

## 2020-05-29 NOTE — PROGRESS NOTES
Subjective   Bianka Fisher is a 60 y.o. female.     No chief complaint on file.    History of Present Illness     Bianka Fisher presents today for No chief complaint on file.    She presents today via video visit for follow-up on fatigue and hypertension.  She had labs completed 1 week ago.  She saw her gastroenterologist on Tuesday.  He placed her on colestipol for persistent diarrhea.    FENa 0.1% anemia resolved A1c 5.8% UA large calcium oxalate crystals    You have chosen to receive care through a telehealth visit.  Do you consent to use a video/audio connection for your medical care today? Yes    The following portions of the patient's history were reviewed and updated as appropriate: allergies, current medications, past family history, past medical history, past social history, past surgical history and problem list.    Active Ambulatory Problems     Diagnosis Date Noted   • Precordial pain 2017   • Mixed hyperlipidemia 2017   • Claudication (CMS/[facility]) 2017   • Atherosclerosis of aorta (CMS/[facility]) 2017   • Essential hypertension 2017   • GERD (gastroesophageal reflux disease) 2018   • Tremor, essential 2018   • Memory loss 2018     Resolved Ambulatory Problems     Diagnosis Date Noted   • No Resolved Ambulatory Problems     Past Medical History:   Diagnosis Date   • Anxiety    • Asthma    • Heart murmur    • Hyperlipidemia    • Hypertension    • Insomnia    • Migraine    • Peripheral neuropathy    • Skin cancer    • Tremors of nervous system      Past Surgical History:   Procedure Laterality Date   • BREAST BIOPSY Right 2017   •  SECTION     • CHOLECYSTECTOMY     • COLONOSCOPY     • TONSILLECTOMY     • TONSILLECTOMY       Family History   Problem Relation Age of Onset   • Lung cancer Mother    • Cancer Mother    • Skin cancer Sister    • Alcohol abuse Sister    • Alcohol abuse Brother    • Dementia Maternal Aunt    • Alzheimer's disease Maternal  Uncle    • Cancer Maternal Uncle    • Mental illness Maternal Uncle    • Alzheimer's disease Paternal Aunt    • Cancer Maternal Grandmother    • Heart disease Maternal Grandfather    • Breast cancer Neg Hx    • Endometrial cancer Neg Hx    • Ovarian cancer Neg Hx      Social History     Socioeconomic History   • Marital status:      Spouse name: Not on file   • Number of children: Not on file   • Years of education: Not on file   • Highest education level: Not on file   Tobacco Use   • Smoking status: Never Smoker   • Smokeless tobacco: Never Used   Substance and Sexual Activity   • Alcohol use: No   • Drug use: No     Comment: quit when pt was in 20's   • Sexual activity: Defer     Partners: Male       Review of Systems  Review of Systems -  General ROS: negative for - chills, fever or night sweats  Cardiovascular ROS: no chest pain or dyspnea on exertion  Gastrointestinal ROS: no abdominal pain, change in bowel habits, or black or bloody stools  Genito-Urinary ROS: no dysuria, trouble voiding, or hematuria    Objective   Last menstrual period 02/01/2016, not currently breastfeeding.  Vitals obtained from patient if available  Physical Exam  Const: Non-toxic appearing, NAD, A&Ox4, Pleasant, Cooperative  Eyes: EOMI, no conjunctivitis  ENT: No copious nasal drainage noted  Cardiac: Regular rate by pulse  Resp: Respiratory rate observed to be within normal limits, no increased work of breathing observed, no audible wheezing or cough noted  Psych: Appropriate mood and behavior.  Procedures  Assessment/Plan   Problem List Items Addressed This Visit        Cardiovascular and Mediastinum    Essential hypertension - Primary    Relevant Medications    losartan (COZAAR) 50 MG tablet    Other Relevant Orders    Basic Metabolic Panel      Other Visit Diagnoses     Elevated serum creatinine        Relevant Orders    Basic Metabolic Panel          See patient diagnoses and orders along with patient instructions for  assessment, plan, and changes to care for patient.    This visit was conducted via telemedicine with live video and audio provided through Video Options: [facility] at the point of care.    Patient Instructions   1.  Cut your losartan in half (25mg) for 2 weeks and then repeat labs    2.  Monitor blood pressure daily    3.  Start Vitamin D3 2,000 U daily      Return in about 2 weeks (around 6/12/2020) for phone visit.    Ambulatory progress note signed and attested to by David Mckeon D.O.

## 2020-06-15 ENCOUNTER — TELEPHONE (OUTPATIENT)
Dept: FAMILY MEDICINE CLINIC | Facility: CLINIC | Age: 60
End: 2020-06-15

## 2020-06-15 NOTE — TELEPHONE ENCOUNTER
PATIENT STATED THE   losartan (COZAAR) 50 MG tablet IS TO SMALL TO CUT IN HALF AND SHE WANTED TO SEE IF YOU WOULD PRESCRIBE THE LOWER DOSE     PATIENT ALL BACK [phone]    [facility]

## 2020-06-16 ENCOUNTER — PATIENT MESSAGE (OUTPATIENT)
Dept: FAMILY MEDICINE CLINIC | Facility: CLINIC | Age: 60
End: 2020-06-16

## 2020-06-16 DIAGNOSIS — I10 ESSENTIAL HYPERTENSION: ICD-10-CM

## 2020-06-16 DIAGNOSIS — F51.01 PRIMARY INSOMNIA: ICD-10-CM

## 2020-06-16 RX ORDER — TRAZODONE HYDROCHLORIDE 50 MG/1
50 TABLET ORAL NIGHTLY
Qty: 30 TABLET | Refills: 0 | Status: SHIPPED | OUTPATIENT
Start: 2020-06-16 | End: 2020-08-11 | Stop reason: SDUPTHER

## 2020-06-16 RX ORDER — LOSARTAN POTASSIUM 25 MG/1
25 TABLET ORAL DAILY
Qty: 90 TABLET | Refills: 0 | Status: SHIPPED | OUTPATIENT
Start: 2020-06-16 | End: 2020-09-15 | Stop reason: SDUPTHER

## 2020-06-19 DIAGNOSIS — Z12.11 SCREENING FOR COLON CANCER: Primary | ICD-10-CM

## 2020-07-03 ENCOUNTER — APPOINTMENT (OUTPATIENT)
Dept: PREADMISSION TESTING | Facility: HOSPITAL | Age: 60
End: 2020-07-03

## 2020-07-03 LAB
REF LAB TEST METHOD: NORMAL
SARS-COV-2 RNA RESP QL NAA+PROBE: NOT DETECTED

## 2020-07-03 PROCEDURE — C9803 HOPD COVID-19 SPEC COLLECT: HCPCS

## 2020-07-03 PROCEDURE — U0004 COV-19 TEST NON-CDC HGH THRU: HCPCS

## 2020-07-03 PROCEDURE — U0002 COVID-19 LAB TEST NON-CDC: HCPCS

## 2020-07-06 ENCOUNTER — OUTSIDE FACILITY SERVICE (OUTPATIENT)
Dept: GASTROENTEROLOGY | Facility: CLINIC | Age: 60
End: 2020-07-06

## 2020-07-06 ENCOUNTER — LAB REQUISITION (OUTPATIENT)
Dept: LAB | Facility: HOSPITAL | Age: 60
End: 2020-07-06

## 2020-07-06 DIAGNOSIS — Z80.0 FAMILY HISTORY OF MALIGNANT NEOPLASM OF DIGESTIVE ORGANS: ICD-10-CM

## 2020-07-06 DIAGNOSIS — Z86.010 PERSONAL HISTORY OF COLONIC POLYPS: ICD-10-CM

## 2020-07-06 DIAGNOSIS — Z12.11 ENCOUNTER FOR SCREENING FOR MALIGNANT NEOPLASM OF COLON: ICD-10-CM

## 2020-07-06 PROCEDURE — 45380 COLONOSCOPY AND BIOPSY: CPT | Performed by: INTERNAL MEDICINE

## 2020-07-06 PROCEDURE — 88305 TISSUE EXAM BY PATHOLOGIST: CPT | Performed by: INTERNAL MEDICINE

## 2020-07-07 LAB
CYTO UR: NORMAL
LAB AP CASE REPORT: NORMAL
LAB AP CLINICAL INFORMATION: NORMAL
PATH REPORT.FINAL DX SPEC: NORMAL
PATH REPORT.GROSS SPEC: NORMAL

## 2020-07-10 ENCOUNTER — LAB (OUTPATIENT)
Dept: LAB | Facility: HOSPITAL | Age: 60
End: 2020-07-10

## 2020-07-10 DIAGNOSIS — R79.89 ELEVATED SERUM CREATININE: ICD-10-CM

## 2020-07-10 DIAGNOSIS — I10 ESSENTIAL HYPERTENSION: ICD-10-CM

## 2020-07-10 LAB
ANION GAP SERPL CALCULATED.3IONS-SCNC: 12 MMOL/L (ref 5–15)
BUN SERPL-MCNC: 9 MG/DL (ref 8–23)
BUN/CREAT SERPL: 9.2 (ref 7–25)
CALCIUM SPEC-SCNC: 9.9 MG/DL (ref 8.6–10.5)
CHLORIDE SERPL-SCNC: 105 MMOL/L (ref 98–107)
CO2 SERPL-SCNC: 23 MMOL/L (ref 22–29)
CREAT SERPL-MCNC: 0.98 MG/DL (ref 0.57–1)
GFR SERPL CREATININE-BSD FRML MDRD: 58 ML/MIN/1.73
GLUCOSE SERPL-MCNC: 87 MG/DL (ref 65–99)
POTASSIUM SERPL-SCNC: 3.9 MMOL/L (ref 3.5–5.2)
SODIUM SERPL-SCNC: 140 MMOL/L (ref 136–145)

## 2020-07-10 PROCEDURE — 80048 BASIC METABOLIC PNL TOTAL CA: CPT

## 2020-07-13 RX ORDER — TRAZODONE HYDROCHLORIDE 150 MG/1
TABLET ORAL
Qty: 30 TABLET | Refills: 1 | Status: SHIPPED | OUTPATIENT
Start: 2020-07-13 | End: 2020-09-23

## 2020-07-27 RX ORDER — CETIRIZINE HYDROCHLORIDE 10 MG/1
TABLET ORAL
Qty: 30 TABLET | Refills: 5 | Status: SHIPPED | OUTPATIENT
Start: 2020-07-27 | End: 2021-01-27

## 2020-07-28 DIAGNOSIS — G25.0 TREMOR, ESSENTIAL: ICD-10-CM

## 2020-07-28 RX ORDER — PROPRANOLOL HCL 60 MG
60 CAPSULE, EXTENDED RELEASE 24HR ORAL DAILY
Qty: 90 CAPSULE | Refills: 1 | Status: SHIPPED | OUTPATIENT
Start: 2020-07-28 | End: 2021-01-27

## 2020-07-29 ENCOUNTER — HOSPITAL ENCOUNTER (OUTPATIENT)
Dept: CARDIOLOGY | Facility: HOSPITAL | Age: 60
Discharge: HOME | End: 2020-07-29
Admitting: INTERNAL MEDICINE

## 2020-07-29 VITALS
WEIGHT: 166 LBS | SYSTOLIC BLOOD PRESSURE: 160 MMHG | HEART RATE: 68 BPM | DIASTOLIC BLOOD PRESSURE: 90 MMHG | BODY MASS INDEX: 30.55 KG/M2 | HEIGHT: 62 IN

## 2020-07-29 DIAGNOSIS — R07.2 PRECORDIAL PAIN: ICD-10-CM

## 2020-07-29 PROCEDURE — 93018 CV STRESS TEST I&R ONLY: CPT | Performed by: INTERNAL MEDICINE

## 2020-07-29 PROCEDURE — 0 TECHNETIUM SESTAMIBI: Performed by: INTERNAL MEDICINE

## 2020-07-29 PROCEDURE — 78452 HT MUSCLE IMAGE SPECT MULT: CPT

## 2020-07-29 PROCEDURE — 78452 HT MUSCLE IMAGE SPECT MULT: CPT | Performed by: INTERNAL MEDICINE

## 2020-07-29 PROCEDURE — 93017 CV STRESS TEST TRACING ONLY: CPT

## 2020-07-29 PROCEDURE — A9500 TC99M SESTAMIBI: HCPCS | Performed by: INTERNAL MEDICINE

## 2020-07-29 RX ADMIN — TECHNETIUM TC 99M SESTAMIBI 1 DOSE: 1 INJECTION INTRAVENOUS at 14:00

## 2020-07-29 RX ADMIN — TECHNETIUM TC 99M SESTAMIBI 1 DOSE: 1 INJECTION INTRAVENOUS at 11:30

## 2020-08-03 LAB
BH CV STRESS BP STAGE 1: NORMAL
BH CV STRESS DURATION MIN STAGE 1: 3
BH CV STRESS DURATION MIN STAGE 2: 2
BH CV STRESS DURATION SEC STAGE 1: 0
BH CV STRESS DURATION SEC STAGE 2: 21
BH CV STRESS GRADE STAGE 1: 10
BH CV STRESS GRADE STAGE 2: 12
BH CV STRESS HR STAGE 1: 136
BH CV STRESS HR STAGE 2: 155
BH CV STRESS METS STAGE 1: 5
BH CV STRESS METS STAGE 2: 7.5
BH CV STRESS O2 STAGE 1: 95
BH CV STRESS O2 STAGE 2: 94
BH CV STRESS PROTOCOL 1: NORMAL
BH CV STRESS RECOVERY BP: NORMAL MMHG
BH CV STRESS RECOVERY HR: 85 BPM
BH CV STRESS SPEED STAGE 1: 1.7
BH CV STRESS SPEED STAGE 2: 2.5
BH CV STRESS STAGE 1: 1
BH CV STRESS STAGE 2: 2
LV EF NUC BP: 68 %
MAXIMAL PREDICTED HEART RATE: 160 BPM
PERCENT MAX PREDICTED HR: 100 %
STRESS BASELINE BP: NORMAL MMHG
STRESS BASELINE HR: 68 BPM
STRESS O2 SAT REST: 96 %
STRESS PERCENT HR: 118 %
STRESS POST ESTIMATED WORKLOAD: 6.3 METS
STRESS POST EXERCISE DUR MIN: 5 MIN
STRESS POST EXERCISE DUR SEC: 21 SEC
STRESS POST O2 SAT PEAK: 95 %
STRESS POST PEAK BP: NORMAL MMHG
STRESS POST PEAK HR: 160 BPM
STRESS TARGET HR: 136 BPM

## 2020-08-11 DIAGNOSIS — F51.01 PRIMARY INSOMNIA: ICD-10-CM

## 2020-08-11 RX ORDER — TRAZODONE HYDROCHLORIDE 50 MG/1
50 TABLET ORAL NIGHTLY
Qty: 30 TABLET | Refills: 0 | Status: SHIPPED | OUTPATIENT
Start: 2020-08-11 | End: 2021-08-31

## 2020-09-15 DIAGNOSIS — I10 ESSENTIAL HYPERTENSION: ICD-10-CM

## 2020-09-15 RX ORDER — LOSARTAN POTASSIUM 25 MG/1
25 TABLET ORAL DAILY
Qty: 90 TABLET | Refills: 0 | Status: SHIPPED | OUTPATIENT
Start: 2020-09-15 | End: 2020-12-17 | Stop reason: SDUPTHER

## 2020-09-23 RX ORDER — TRAZODONE HYDROCHLORIDE 150 MG/1
TABLET ORAL
Qty: 30 TABLET | Refills: 2 | Status: SHIPPED | OUTPATIENT
Start: 2020-09-23 | End: 2020-12-16 | Stop reason: SDUPTHER

## 2020-11-17 ENCOUNTER — OFFICE VISIT (OUTPATIENT)
Dept: FAMILY MEDICINE CLINIC | Facility: CLINIC | Age: 60
End: 2020-11-17

## 2020-11-17 ENCOUNTER — LAB (OUTPATIENT)
Dept: LAB | Facility: HOSPITAL | Age: 60
End: 2020-11-17

## 2020-11-17 VITALS
HEART RATE: 62 BPM | TEMPERATURE: 98 F | BODY MASS INDEX: 31.03 KG/M2 | SYSTOLIC BLOOD PRESSURE: 126 MMHG | WEIGHT: 168.6 LBS | DIASTOLIC BLOOD PRESSURE: 72 MMHG | OXYGEN SATURATION: 96 % | HEIGHT: 62 IN

## 2020-11-17 DIAGNOSIS — I10 ESSENTIAL HYPERTENSION: ICD-10-CM

## 2020-11-17 DIAGNOSIS — R73.9 HYPERGLYCEMIA: ICD-10-CM

## 2020-11-17 DIAGNOSIS — M35.3 POLYMYALGIA (HCC): ICD-10-CM

## 2020-11-17 DIAGNOSIS — R79.89 ELEVATED SERUM CREATININE: ICD-10-CM

## 2020-11-17 DIAGNOSIS — G44.52 NEW DAILY PERSISTENT HEADACHE: ICD-10-CM

## 2020-11-17 DIAGNOSIS — I10 ESSENTIAL HYPERTENSION: Primary | ICD-10-CM

## 2020-11-17 LAB
ALBUMIN SERPL-MCNC: 4.3 G/DL (ref 3.5–5.2)
ALBUMIN/GLOB SERPL: 1.5 G/DL
ALP SERPL-CCNC: 88 U/L (ref 39–117)
ALT SERPL W P-5'-P-CCNC: 12 U/L (ref 1–33)
ANION GAP SERPL CALCULATED.3IONS-SCNC: 9 MMOL/L (ref 5–15)
AST SERPL-CCNC: 19 U/L (ref 1–32)
BILIRUB SERPL-MCNC: 1 MG/DL (ref 0–1.2)
BUN SERPL-MCNC: 9 MG/DL (ref 8–23)
BUN/CREAT SERPL: 9.3 (ref 7–25)
CALCIUM SPEC-SCNC: 9.7 MG/DL (ref 8.6–10.5)
CHLORIDE SERPL-SCNC: 106 MMOL/L (ref 98–107)
CHOLEST SERPL-MCNC: 190 MG/DL (ref 0–200)
CK SERPL-CCNC: 32 U/L (ref 20–180)
CO2 SERPL-SCNC: 25 MMOL/L (ref 22–29)
CREAT SERPL-MCNC: 0.97 MG/DL (ref 0.57–1)
CRP SERPL-MCNC: 0.08 MG/DL (ref 0–0.5)
GFR SERPL CREATININE-BSD FRML MDRD: 59 ML/MIN/1.73
GLOBULIN UR ELPH-MCNC: 2.8 GM/DL
GLUCOSE SERPL-MCNC: 84 MG/DL (ref 65–99)
HDLC SERPL-MCNC: 56 MG/DL (ref 40–60)
LDLC SERPL CALC-MCNC: 118 MG/DL (ref 0–100)
LDLC/HDLC SERPL: 2.08 {RATIO}
POTASSIUM SERPL-SCNC: 3.9 MMOL/L (ref 3.5–5.2)
PROT SERPL-MCNC: 7.1 G/DL (ref 6–8.5)
SODIUM SERPL-SCNC: 140 MMOL/L (ref 136–145)
T4 FREE SERPL-MCNC: 1.3 NG/DL (ref 0.93–1.7)
TRIGL SERPL-MCNC: 87 MG/DL (ref 0–150)
TSH SERPL DL<=0.05 MIU/L-ACNC: 1.55 UIU/ML (ref 0.27–4.2)
VLDLC SERPL-MCNC: 16 MG/DL (ref 5–40)

## 2020-11-17 PROCEDURE — 84443 ASSAY THYROID STIM HORMONE: CPT

## 2020-11-17 PROCEDURE — 80061 LIPID PANEL: CPT

## 2020-11-17 PROCEDURE — 85652 RBC SED RATE AUTOMATED: CPT

## 2020-11-17 PROCEDURE — 86038 ANTINUCLEAR ANTIBODIES: CPT

## 2020-11-17 PROCEDURE — 86140 C-REACTIVE PROTEIN: CPT

## 2020-11-17 PROCEDURE — 80053 COMPREHEN METABOLIC PANEL: CPT

## 2020-11-17 PROCEDURE — 99214 OFFICE O/P EST MOD 30 MIN: CPT | Performed by: FAMILY MEDICINE

## 2020-11-17 PROCEDURE — 86431 RHEUMATOID FACTOR QUANT: CPT

## 2020-11-17 PROCEDURE — 82550 ASSAY OF CK (CPK): CPT

## 2020-11-17 PROCEDURE — 83036 HEMOGLOBIN GLYCOSYLATED A1C: CPT

## 2020-11-17 PROCEDURE — 84439 ASSAY OF FREE THYROXINE: CPT

## 2020-11-17 RX ORDER — MONTELUKAST SODIUM 4 MG/1
1 TABLET, CHEWABLE ORAL 2 TIMES DAILY
COMMUNITY
Start: 2020-10-17 | End: 2022-12-05 | Stop reason: SDUPTHER

## 2020-11-17 NOTE — PATIENT INSTRUCTIONS
1.  Reduce trazodone to half tablet nightly    2.  Try facial muscle physical therapy as discussed

## 2020-11-17 NOTE — PROGRESS NOTES
Subjective   Bianka Fisher is a 60 y.o. female.     Chief Complaint   Patient presents with   • Headache     couple of months       History of Present Illness     Bianka Fisher presents today for   Chief Complaint   Patient presents with   • Headache     couple of months     Patient concerned about temporal arteritis.  Symptoms have been going on for months.  Headaches seem to start in the temples, and arc over the top of the head to the other temple.  She reports that her scalp feels tender to touch.  She also has muscle aches and what feels like inflammation in her shoulders and neck.  She has previously had a throbbing feeling in her inner ear and has been told she might have temporomandibular joint dysfunction.    This patient is accompanied by their self who contributes to the history of their care.    The following portions of the patient's history were reviewed and updated as appropriate: allergies, current medications, past family history, past medical history, past social history, past surgical history and problem list.    Active Ambulatory Problems     Diagnosis Date Noted   • Precordial pain 2017   • Mixed hyperlipidemia 2017   • Claudication (CMS/[facility]) 2017   • Atherosclerosis of aorta (CMS/[facility]) 2017   • Essential hypertension 2017   • GERD (gastroesophageal reflux disease) 2018   • Tremor, essential 2018   • Memory loss 2018     Resolved Ambulatory Problems     Diagnosis Date Noted   • No Resolved Ambulatory Problems     Past Medical History:   Diagnosis Date   • Anxiety    • Asthma    • Heart murmur    • Hyperlipidemia    • Hypertension    • Insomnia    • Migraine    • Peripheral neuropathy    • Skin cancer    • Tremors of nervous system      Past Surgical History:   Procedure Laterality Date   • BREAST BIOPSY Right 2017   •  SECTION     • CHOLECYSTECTOMY     • COLONOSCOPY     • TONSILLECTOMY     • TONSILLECTOMY       Family History  "  Problem Relation Age of Onset   • Lung cancer Mother    • Cancer Mother    • Skin cancer Sister    • Alcohol abuse Sister    • Alcohol abuse Brother    • Dementia Maternal Aunt    • Alzheimer's disease Maternal Uncle    • Cancer Maternal Uncle    • Mental illness Maternal Uncle    • Alzheimer's disease Paternal Aunt    • Cancer Maternal Grandmother    • Heart disease Maternal Grandfather    • Breast cancer Neg Hx    • Endometrial cancer Neg Hx    • Ovarian cancer Neg Hx      Social History     Socioeconomic History   • Marital status:      Spouse name: Not on file   • Number of children: Not on file   • Years of education: Not on file   • Highest education level: Not on file   Tobacco Use   • Smoking status: Never Smoker   • Smokeless tobacco: Never Used   Substance and Sexual Activity   • Alcohol use: No   • Drug use: No     Comment: quit when pt was in 20's   • Sexual activity: Defer     Partners: Male       Review of Systems  Review of Systems -  General ROS: negative for - chills, fever or night sweats  Cardiovascular ROS: no chest pain or dyspnea on exertion  Gastrointestinal ROS: no abdominal pain, change in bowel habits, or black or bloody stools  Genito-Urinary ROS: no dysuria, trouble voiding, or hematuria    Objective   Blood pressure 126/72, pulse 62, temperature 98 °F (36.7 °C), height 157.5 cm (62.01\"), weight 76.5 kg (168 lb 9.6 oz), last menstrual period 02/01/2016, SpO2 96 %, not currently breastfeeding.  Nursing note reviewed  Physical Exam  Const: NAD, A&Ox4, Pleasant, Cooperative  Eyes: EOMI, no conjunctivitis  ENT: No nasal discharge present, neck supple.  No tenderness to palpation along the temporal artery bilaterally.  No temporomandibular joint dysfunction noted  Cardiac: Regular rate and rhythm, no cyanosis  Resp: Respiratory rate within normal limits, no increased work of breathing, no audible wheezing or retractions noted  GI: No distention or ascites  MSK: Motor and sensation " grossly intact in bilateral upper extremities  Neurologic: CN II-XII grossly intact  Psych: Appropriate mood and behavior.  Skin: Warm, dry  Procedures  Assessment/Plan   Problem List Items Addressed This Visit        Cardiovascular and Mediastinum    Essential hypertension - Primary    Relevant Orders    Comprehensive Metabolic Panel    TSH    T4, Free    CK    Sedimentation Rate    ASIYA    Rheumatoid Factor    C-reactive Protein    Lipid Panel    Hemoglobin A1c      Other Visit Diagnoses     Elevated serum creatinine        Relevant Orders    Comprehensive Metabolic Panel    TSH    T4, Free    CK    Sedimentation Rate    ASIYA    Rheumatoid Factor    C-reactive Protein    Lipid Panel    Hemoglobin A1c    Hyperglycemia        Relevant Orders    Comprehensive Metabolic Panel    TSH    T4, Free    CK    Sedimentation Rate    ASIYA    Rheumatoid Factor    C-reactive Protein    Lipid Panel    Hemoglobin A1c    New daily persistent headache        Relevant Orders    MRI Brain Without Contrast    Comprehensive Metabolic Panel    TSH    T4, Free    CK    Sedimentation Rate    ASIYA    Rheumatoid Factor    C-reactive Protein    Lipid Panel    Hemoglobin A1c    Polymyalgia (CMS/HCC)        Relevant Orders    MRI Brain Without Contrast    Comprehensive Metabolic Panel    TSH    T4, Free    CK    Sedimentation Rate    ASIYA    Rheumatoid Factor    C-reactive Protein    Lipid Panel    Hemoglobin A1c          See patient diagnoses and orders along with patient instructions for assessment, plan, and changes to care for patient.    Patient Instructions   1.  Reduce trazodone to half tablet nightly    2.  Try facial muscle physical therapy as discussed        Return in about 2 weeks (around 12/1/2020) for video visit.    Ambulatory progress note signed and attested to by David Mckeon D.O.

## 2020-11-18 LAB
CHROMATIN AB SERPL-ACNC: <10 IU/ML (ref 0–14)
ERYTHROCYTE [SEDIMENTATION RATE] IN BLOOD: 15 MM/HR (ref 0–30)
HBA1C MFR BLD: 5.73 % (ref 4.8–5.6)

## 2020-11-19 LAB — ANA SER QL: NEGATIVE

## 2020-12-07 ENCOUNTER — APPOINTMENT (OUTPATIENT)
Dept: MRI IMAGING | Facility: HOSPITAL | Age: 60
End: 2020-12-07

## 2020-12-16 RX ORDER — TRAZODONE HYDROCHLORIDE 150 MG/1
150 TABLET ORAL
Qty: 90 TABLET | Refills: 3 | Status: SHIPPED | OUTPATIENT
Start: 2020-12-16 | End: 2021-08-31

## 2020-12-17 DIAGNOSIS — I10 ESSENTIAL HYPERTENSION: ICD-10-CM

## 2020-12-17 RX ORDER — LOSARTAN POTASSIUM 25 MG/1
25 TABLET ORAL DAILY
Qty: 90 TABLET | Refills: 0 | Status: SHIPPED | OUTPATIENT
Start: 2020-12-17 | End: 2021-03-17

## 2020-12-17 RX ORDER — PITAVASTATIN CALCIUM 4.18 MG/1
TABLET, FILM COATED ORAL
Qty: 30 TABLET | Refills: 5 | Status: SHIPPED | OUTPATIENT
Start: 2020-12-17 | End: 2021-06-21

## 2021-01-27 DIAGNOSIS — G25.0 TREMOR, ESSENTIAL: ICD-10-CM

## 2021-01-27 RX ORDER — CETIRIZINE HYDROCHLORIDE 10 MG/1
TABLET ORAL
Qty: 30 TABLET | Refills: 5 | Status: SHIPPED | OUTPATIENT
Start: 2021-01-27 | End: 2021-08-31 | Stop reason: ALTCHOICE

## 2021-01-27 RX ORDER — PROPRANOLOL HCL 60 MG
CAPSULE, EXTENDED RELEASE 24HR ORAL
Qty: 30 CAPSULE | Refills: 5 | Status: SHIPPED | OUTPATIENT
Start: 2021-01-27 | End: 2021-08-31 | Stop reason: SDUPTHER

## 2021-03-17 DIAGNOSIS — I10 ESSENTIAL HYPERTENSION: ICD-10-CM

## 2021-03-17 RX ORDER — LOSARTAN POTASSIUM 25 MG/1
TABLET ORAL
Qty: 30 TABLET | Refills: 2 | Status: SHIPPED | OUTPATIENT
Start: 2021-03-17 | End: 2021-06-18

## 2021-06-18 DIAGNOSIS — I10 ESSENTIAL HYPERTENSION: ICD-10-CM

## 2021-06-18 RX ORDER — LOSARTAN POTASSIUM 25 MG/1
TABLET ORAL
Qty: 30 TABLET | Refills: 2 | Status: SHIPPED | OUTPATIENT
Start: 2021-06-18 | End: 2021-08-31 | Stop reason: SDUPTHER

## 2021-06-21 RX ORDER — PITAVASTATIN CALCIUM 4.18 MG/1
1 TABLET, FILM COATED ORAL
Qty: 30 TABLET | Refills: 1 | Status: SHIPPED | OUTPATIENT
Start: 2021-06-21 | End: 2021-07-19

## 2021-07-19 RX ORDER — PITAVASTATIN CALCIUM 4.18 MG/1
1 TABLET, FILM COATED ORAL
Qty: 30 TABLET | Refills: 1 | Status: SHIPPED | OUTPATIENT
Start: 2021-07-19 | End: 2021-08-16

## 2021-08-16 RX ORDER — PITAVASTATIN CALCIUM 4.18 MG/1
1 TABLET, FILM COATED ORAL
Qty: 30 TABLET | Refills: 1 | Status: SHIPPED | OUTPATIENT
Start: 2021-08-16 | End: 2021-08-31 | Stop reason: SDUPTHER

## 2021-08-19 DIAGNOSIS — G25.0 TREMOR, ESSENTIAL: ICD-10-CM

## 2021-08-19 RX ORDER — PROPRANOLOL HCL 60 MG
CAPSULE, EXTENDED RELEASE 24HR ORAL
Qty: 30 CAPSULE | Refills: 0 | OUTPATIENT
Start: 2021-08-19

## 2021-08-19 RX ORDER — CETIRIZINE HYDROCHLORIDE 10 MG/1
TABLET ORAL
Qty: 30 TABLET | Refills: 0 | OUTPATIENT
Start: 2021-08-19

## 2021-08-19 RX ORDER — PROPRANOLOL HCL 60 MG
60 CAPSULE, EXTENDED RELEASE 24HR ORAL DAILY
Qty: 30 CAPSULE | Refills: 0 | Status: CANCELLED | OUTPATIENT
Start: 2021-08-19

## 2021-08-19 RX ORDER — CETIRIZINE HYDROCHLORIDE 10 MG/1
10 TABLET ORAL DAILY
Qty: 30 TABLET | Refills: 0 | Status: CANCELLED | OUTPATIENT
Start: 2021-08-19

## 2021-08-19 NOTE — TELEPHONE ENCOUNTER
Rx Refill Note  Requested Prescriptions     Pending Prescriptions Disp Refills   • propranolol LA (INDERAL LA) 60 MG 24 hr capsule [Pharmacy Med Name: PROPRANOLOL ER 60 MG CAPSULE] 30 capsule 5     Sig: TAKE 1 CAPSULE BY MOUTH EVERY DAY   • cetirizine (zyrTEC) 10 MG tablet [Pharmacy Med Name: CETIRIZINE HCL 10 MG TABLET] 30 tablet 5     Sig: TAKE 1 TABLET BY MOUTH EVERY DAY      Last office visit with prescribing clinician: 2020      Next office visit with prescribing clinician: Visit date not found            Alfreda Vidal MA  21, 10:41 EDT         Called patient to advise her that she will need a F/U in order for us to refill her medication. Ask patient to verify her  to confirm that it is her. Patient refused to give me the info. Advised patient that this is in regards to her medications and that I couldn't give her any other information until she verifies her . Patient refused so, I advised her to call the office. Patient understood and had no further questions. Refill was not sent

## 2021-08-19 NOTE — TELEPHONE ENCOUNTER
PATIENT CALLED THE OFFICE TO CONFIRM HER INFORMATION FOR HER REFILLS. THE  AND ADDRESS IS THE SAME AS WE HAVE. THE PHARMACY IS REQUESTING THE REFILL AND SHE HAS APPROVED THAT.     REQUEST WE FILL THE SCRIPTS AND TO PLEASE CALL IF ANY OTHER QUESTIONS.

## 2021-08-31 ENCOUNTER — OFFICE VISIT (OUTPATIENT)
Dept: FAMILY MEDICINE CLINIC | Facility: CLINIC | Age: 61
End: 2021-08-31

## 2021-08-31 VITALS
BODY MASS INDEX: 31.73 KG/M2 | HEART RATE: 61 BPM | RESPIRATION RATE: 16 BRPM | DIASTOLIC BLOOD PRESSURE: 68 MMHG | OXYGEN SATURATION: 96 % | SYSTOLIC BLOOD PRESSURE: 122 MMHG | HEIGHT: 62 IN | TEMPERATURE: 97.6 F | WEIGHT: 172.4 LBS

## 2021-08-31 DIAGNOSIS — I10 ESSENTIAL HYPERTENSION: Primary | ICD-10-CM

## 2021-08-31 DIAGNOSIS — E78.2 MIXED HYPERLIPIDEMIA: ICD-10-CM

## 2021-08-31 DIAGNOSIS — R73.9 HYPERGLYCEMIA: ICD-10-CM

## 2021-08-31 DIAGNOSIS — G25.0 TREMOR, ESSENTIAL: ICD-10-CM

## 2021-08-31 PROCEDURE — 99214 OFFICE O/P EST MOD 30 MIN: CPT | Performed by: FAMILY MEDICINE

## 2021-08-31 RX ORDER — CETIRIZINE HYDROCHLORIDE 10 MG/1
10 TABLET ORAL DAILY
Qty: 30 TABLET | Refills: 5 | Status: CANCELLED | OUTPATIENT
Start: 2021-08-31

## 2021-08-31 RX ORDER — FEXOFENADINE HCL 180 MG/1
180 TABLET ORAL DAILY
Qty: 90 TABLET | Refills: 3 | Status: SHIPPED | OUTPATIENT
Start: 2021-08-31 | End: 2021-11-02

## 2021-08-31 RX ORDER — PITAVASTATIN CALCIUM 4.18 MG/1
1 TABLET, FILM COATED ORAL
Qty: 90 TABLET | Refills: 3 | Status: SHIPPED | OUTPATIENT
Start: 2021-08-31 | End: 2022-04-26 | Stop reason: SDUPTHER

## 2021-08-31 RX ORDER — LOSARTAN POTASSIUM 25 MG/1
25 TABLET ORAL DAILY
Qty: 90 TABLET | Refills: 3 | Status: SHIPPED | OUTPATIENT
Start: 2021-08-31 | End: 2022-04-26 | Stop reason: SDUPTHER

## 2021-08-31 RX ORDER — PROPRANOLOL HCL 60 MG
60 CAPSULE, EXTENDED RELEASE 24HR ORAL DAILY
Qty: 90 CAPSULE | Refills: 3 | Status: SHIPPED | OUTPATIENT
Start: 2021-08-31 | End: 2022-04-26 | Stop reason: SDUPTHER

## 2021-09-02 ENCOUNTER — LAB (OUTPATIENT)
Dept: LAB | Facility: HOSPITAL | Age: 61
End: 2021-09-02

## 2021-09-02 DIAGNOSIS — G25.0 TREMOR, ESSENTIAL: ICD-10-CM

## 2021-09-02 DIAGNOSIS — R53.83 FATIGUE, UNSPECIFIED TYPE: ICD-10-CM

## 2021-09-02 DIAGNOSIS — G25.0 TREMOR, ESSENTIAL: Primary | ICD-10-CM

## 2021-09-02 DIAGNOSIS — R73.9 HYPERGLYCEMIA: ICD-10-CM

## 2021-09-02 LAB
HBA1C MFR BLD: 5.84 % (ref 4.8–5.6)
TSH SERPL DL<=0.05 MIU/L-ACNC: 2.37 UIU/ML (ref 0.27–4.2)

## 2021-09-02 PROCEDURE — 83036 HEMOGLOBIN GLYCOSYLATED A1C: CPT

## 2021-09-02 PROCEDURE — 36415 COLL VENOUS BLD VENIPUNCTURE: CPT

## 2021-09-02 PROCEDURE — 84443 ASSAY THYROID STIM HORMONE: CPT

## 2021-11-02 ENCOUNTER — OFFICE VISIT (OUTPATIENT)
Dept: FAMILY MEDICINE CLINIC | Facility: CLINIC | Age: 61
End: 2021-11-02

## 2021-11-02 VITALS
TEMPERATURE: 97.1 F | HEIGHT: 62 IN | RESPIRATION RATE: 16 BRPM | SYSTOLIC BLOOD PRESSURE: 122 MMHG | OXYGEN SATURATION: 97 % | BODY MASS INDEX: 32.02 KG/M2 | HEART RATE: 62 BPM | WEIGHT: 174 LBS | DIASTOLIC BLOOD PRESSURE: 82 MMHG

## 2021-11-02 DIAGNOSIS — E78.2 MIXED HYPERLIPIDEMIA: ICD-10-CM

## 2021-11-02 DIAGNOSIS — F51.01 PRIMARY INSOMNIA: Primary | ICD-10-CM

## 2021-11-02 DIAGNOSIS — Z13.0 SCREENING FOR DEFICIENCY ANEMIA: ICD-10-CM

## 2021-11-02 DIAGNOSIS — I10 ESSENTIAL HYPERTENSION: ICD-10-CM

## 2021-11-02 DIAGNOSIS — R73.9 HYPERGLYCEMIA: ICD-10-CM

## 2021-11-02 DIAGNOSIS — E55.9 VITAMIN D DEFICIENCY: ICD-10-CM

## 2021-11-02 DIAGNOSIS — Z13.29 SCREENING FOR ENDOCRINE DISORDER: ICD-10-CM

## 2021-11-02 DIAGNOSIS — G25.0 TREMOR, ESSENTIAL: ICD-10-CM

## 2021-11-02 PROCEDURE — 99214 OFFICE O/P EST MOD 30 MIN: CPT | Performed by: FAMILY MEDICINE

## 2021-11-02 RX ORDER — ERGOCALCIFEROL 1.25 MG/1
50000 CAPSULE ORAL WEEKLY
Qty: 12 CAPSULE | Refills: 0 | Status: SHIPPED | OUTPATIENT
Start: 2021-11-02 | End: 2022-01-24

## 2021-11-02 RX ORDER — TRAZODONE HYDROCHLORIDE 100 MG/1
100 TABLET ORAL NIGHTLY
Qty: 90 TABLET | Refills: 1 | Status: SHIPPED | OUTPATIENT
Start: 2021-11-02 | End: 2022-04-25

## 2021-11-02 RX ORDER — FLUTICASONE PROPIONATE 50 MCG
1 SPRAY, SUSPENSION (ML) NASAL DAILY
Qty: 9.9 ML | Refills: 11 | Status: SHIPPED | OUTPATIENT
Start: 2021-11-02 | End: 2022-04-26 | Stop reason: SDUPTHER

## 2021-11-02 NOTE — PROGRESS NOTES
Subjective   Bianka Fisher is a 61 y.o. female.     Chief Complaint   Patient presents with   • Insomnia       History of Present Illness     Bianka Fisher presents today for   Chief Complaint   Patient presents with   • Insomnia     The patient presents to the clinic for an acute same day visit regarding insomnia. She notes that she has not slept well since she has been off of the medication. Previously, she tried Tylenol PM. She notes the last few nights, she has tried melatonin 10 mg, and has woken up with a headache. The patient verbalizes that she was oversleeping on the trazadone 150 mg; however, feels as if she still needs sleep. She notes she has also tried Ambien, but had some concerns with the medication being addictive. She verbalizes that she was late for work this morning due to her insomnia.    The patient notes she is stressed because she is going through a divorce, and also because her supervisor is creating issues at work.    She endorses that she is currently taking Allegra for allergies; however, this has reportedly had no effect on her allergy symptoms.     She has concern in regard to her thyroid being enlarged. She states that she is amenable to having an ultrasound performed if necessary.     The patient reports that she takes pravastatin 4 mg daily for hyperlipidemia.    She states that she has not taken vitamin D in approximately a year for her vitamin D deficiency.    This patient is accompanied by their self who contributes to the history of their care.    The following portions of the patient's history were reviewed and updated as appropriate: allergies, current medications, past family history, past medical history, past social history, past surgical history and problem list.    Active Ambulatory Problems     Diagnosis Date Noted   • Precordial pain 08/02/2017   • Mixed hyperlipidemia 08/02/2017   • Claudication (HCC) 08/02/2017   • Atherosclerosis of aorta (HCC) 08/02/2017   •  "Essential hypertension 2017   • GERD (gastroesophageal reflux disease) 2018   • Tremor, essential 2018   • Memory loss 2018     Resolved Ambulatory Problems     Diagnosis Date Noted   • No Resolved Ambulatory Problems     Past Medical History:   Diagnosis Date   • Anxiety    • Asthma    • Heart murmur    • Hyperlipidemia    • Hypertension    • Insomnia    • Migraine    • Peripheral neuropathy    • Skin cancer    • Tremors of nervous system      Past Surgical History:   Procedure Laterality Date   • BREAST BIOPSY Right 2017   •  SECTION     • CHOLECYSTECTOMY     • COLONOSCOPY     • TONSILLECTOMY     • TONSILLECTOMY       Family History   Problem Relation Age of Onset   • Lung cancer Mother    • Cancer Mother    • Skin cancer Sister    • Alcohol abuse Sister    • Alcohol abuse Brother    • Dementia Maternal Aunt    • Alzheimer's disease Maternal Uncle    • Cancer Maternal Uncle    • Mental illness Maternal Uncle    • Alzheimer's disease Paternal Aunt    • Cancer Maternal Grandmother    • Heart disease Maternal Grandfather    • Breast cancer Neg Hx    • Endometrial cancer Neg Hx    • Ovarian cancer Neg Hx      Social History     Socioeconomic History   • Marital status:    Tobacco Use   • Smoking status: Never Smoker   • Smokeless tobacco: Never Used   Vaping Use   • Vaping Use: Never used   Substance and Sexual Activity   • Alcohol use: No   • Drug use: No     Comment: quit when pt was in 20's   • Sexual activity: Defer     Partners: Male       Review of Systems  See HPI    Objective   Blood pressure 122/82, pulse 62, temperature 97.1 °F (36.2 °C), resp. rate 16, height 157.5 cm (62.01\"), weight 78.9 kg (174 lb), last menstrual period 2016, SpO2 97 %, not currently breastfeeding.  Nursing note reviewed  Physical Exam  Vitals reviewed.   HENT:      Mouth/Throat:      Comments: Thyroid fullness has resolved since last exam      Const: NAD, A&Ox4, Pleasant, " Cooperative  Eyes: EOMI, no conjunctivitis  ENT: No nasal discharge present, neck supple  Procedures  Assessment/Plan   Problem List Items Addressed This Visit        Cardiac and Vasculature    Mixed hyperlipidemia    Relevant Orders    Lipid Panel    Essential hypertension    Relevant Orders    Microalbumin / Creatinine Urine Ratio - Urine, Clean Catch       Neuro    Tremor, essential      Other Visit Diagnoses     Primary insomnia    -  Primary    Relevant Medications    traZODone (DESYREL) 100 MG tablet    Vitamin D deficiency        Relevant Medications    vitamin D (ERGOCALCIFEROL) 1.25 MG (61632 UT) capsule capsule    Other Relevant Orders    Vitamin D 25 Hydroxy    Hyperglycemia        Relevant Orders    Hemoglobin A1c    Screening for endocrine disorder        Relevant Orders    Comprehensive Metabolic Panel    Urinalysis With Microscopic If Indicated (No Culture) - Urine, Clean Catch    TSH    T4, free    Screening for deficiency anemia        Relevant Orders    CBC (No Diff)          1. Insomnia.  -Multifactorial, certainly acutely worsened by some stressors and depression. She has done well with trazadone in the past, as recently as last year. She has had some daytime somnolence on the 150 mg, we will restart her on 100 mg daily. She will message me as the week progresses to let me know how she is doing.    2. Essential tremor.  -The patient is currently stable on propranolol LA 60 mg daily.    3. Vitamin D deficiency.  - She notes she has not been taking her vitamin D supplement in approximately a year.  I recommend that she resumes vitamin D at 50,000 units weekly, and we will recheck her level in 3 months.    4. Hyperlipidemia.  -The patient has been intolerant to numerous statins in the past. Currently, she is taking pravastatin 4 mg daily. we will recheck her level in 3 months before her next visit.      There are no Patient Instructions on file for this visit.    Return in about 3 months (around  2/2/2022) for Annual, (fasting blood work 1 week prior to appt).    MDM     Ambulatory progress note signed and attested to by David Mckeon D.O.         Transcribed from ambient dictation for David Mckeon DO by Anselmo Munoz.  11/02/21   18:00 EDT    I have personally performed the services described in this document as transcribed by the above individual, and it is both accurate and complete.  David Mckeon DO  11/3/2021  13:44 EDT

## 2022-01-24 ENCOUNTER — LAB (OUTPATIENT)
Dept: LAB | Facility: HOSPITAL | Age: 62
End: 2022-01-24

## 2022-01-24 DIAGNOSIS — R73.9 HYPERGLYCEMIA: ICD-10-CM

## 2022-01-24 DIAGNOSIS — E55.9 VITAMIN D DEFICIENCY: ICD-10-CM

## 2022-01-24 DIAGNOSIS — E78.2 MIXED HYPERLIPIDEMIA: ICD-10-CM

## 2022-01-24 DIAGNOSIS — I10 ESSENTIAL HYPERTENSION: ICD-10-CM

## 2022-01-24 DIAGNOSIS — Z13.0 SCREENING FOR DEFICIENCY ANEMIA: ICD-10-CM

## 2022-01-24 DIAGNOSIS — Z13.29 SCREENING FOR ENDOCRINE DISORDER: ICD-10-CM

## 2022-01-24 LAB
25(OH)D3 SERPL-MCNC: 31.7 NG/ML (ref 30–100)
ALBUMIN SERPL-MCNC: 4.3 G/DL (ref 3.5–5.2)
ALBUMIN UR-MCNC: 1.3 MG/DL
ALBUMIN/GLOB SERPL: 1.7 G/DL
ALP SERPL-CCNC: 88 U/L (ref 39–117)
ALT SERPL W P-5'-P-CCNC: 13 U/L (ref 1–33)
ANION GAP SERPL CALCULATED.3IONS-SCNC: 5.8 MMOL/L (ref 5–15)
AST SERPL-CCNC: 14 U/L (ref 1–32)
BILIRUB SERPL-MCNC: 1 MG/DL (ref 0–1.2)
BILIRUB UR QL STRIP: NEGATIVE
BUN SERPL-MCNC: 10 MG/DL (ref 8–23)
BUN/CREAT SERPL: 8.8 (ref 7–25)
CALCIUM SPEC-SCNC: 9.9 MG/DL (ref 8.6–10.5)
CHLORIDE SERPL-SCNC: 105 MMOL/L (ref 98–107)
CHOLEST SERPL-MCNC: 204 MG/DL (ref 0–200)
CLARITY UR: CLEAR
CO2 SERPL-SCNC: 27.2 MMOL/L (ref 22–29)
COLOR UR: YELLOW
CREAT SERPL-MCNC: 1.14 MG/DL (ref 0.57–1)
CREAT UR-MCNC: 265.8 MG/DL
DEPRECATED RDW RBC AUTO: 47.4 FL (ref 37–54)
ERYTHROCYTE [DISTWIDTH] IN BLOOD BY AUTOMATED COUNT: 15.8 % (ref 12.3–15.4)
GFR SERPL CREATININE-BSD FRML MDRD: 48 ML/MIN/1.73
GLOBULIN UR ELPH-MCNC: 2.6 GM/DL
GLUCOSE SERPL-MCNC: 90 MG/DL (ref 65–99)
GLUCOSE UR STRIP-MCNC: NEGATIVE MG/DL
HBA1C MFR BLD: 6.46 % (ref 4.8–5.6)
HCT VFR BLD AUTO: 41.3 % (ref 34–46.6)
HDLC SERPL-MCNC: 49 MG/DL (ref 40–60)
HGB BLD-MCNC: 13.4 G/DL (ref 12–15.9)
HGB UR QL STRIP.AUTO: NEGATIVE
KETONES UR QL STRIP: ABNORMAL
LDLC SERPL CALC-MCNC: 138 MG/DL (ref 0–100)
LDLC/HDLC SERPL: 2.77 {RATIO}
LEUKOCYTE ESTERASE UR QL STRIP.AUTO: ABNORMAL
MCH RBC QN AUTO: 27 PG (ref 26.6–33)
MCHC RBC AUTO-ENTMCNC: 32.4 G/DL (ref 31.5–35.7)
MCV RBC AUTO: 83.3 FL (ref 79–97)
MICROALBUMIN/CREAT UR: 4.9 MG/G
NITRITE UR QL STRIP: NEGATIVE
PH UR STRIP.AUTO: 6 [PH] (ref 5–8)
PLATELET # BLD AUTO: 189 10*3/MM3 (ref 140–450)
PMV BLD AUTO: 12.2 FL (ref 6–12)
POTASSIUM SERPL-SCNC: 4.7 MMOL/L (ref 3.5–5.2)
PROT SERPL-MCNC: 6.9 G/DL (ref 6–8.5)
PROT UR QL STRIP: ABNORMAL
RBC # BLD AUTO: 4.96 10*6/MM3 (ref 3.77–5.28)
SODIUM SERPL-SCNC: 138 MMOL/L (ref 136–145)
SP GR UR STRIP: 1.02 (ref 1–1.03)
T4 FREE SERPL-MCNC: 1.39 NG/DL (ref 0.93–1.7)
TRIGL SERPL-MCNC: 96 MG/DL (ref 0–150)
TSH SERPL DL<=0.05 MIU/L-ACNC: 2.55 UIU/ML (ref 0.27–4.2)
UROBILINOGEN UR QL STRIP: ABNORMAL
VLDLC SERPL-MCNC: 17 MG/DL (ref 5–40)
WBC NRBC COR # BLD: 7.67 10*3/MM3 (ref 3.4–10.8)

## 2022-01-24 PROCEDURE — 80050 GENERAL HEALTH PANEL: CPT

## 2022-01-24 PROCEDURE — 82306 VITAMIN D 25 HYDROXY: CPT

## 2022-01-24 PROCEDURE — 82570 ASSAY OF URINE CREATININE: CPT

## 2022-01-24 PROCEDURE — 84439 ASSAY OF FREE THYROXINE: CPT

## 2022-01-24 PROCEDURE — 81001 URINALYSIS AUTO W/SCOPE: CPT

## 2022-01-24 PROCEDURE — 82043 UR ALBUMIN QUANTITATIVE: CPT

## 2022-01-24 PROCEDURE — 80061 LIPID PANEL: CPT

## 2022-01-24 PROCEDURE — 83036 HEMOGLOBIN GLYCOSYLATED A1C: CPT

## 2022-01-24 RX ORDER — ERGOCALCIFEROL 1.25 MG/1
50000 CAPSULE ORAL WEEKLY
Qty: 4 CAPSULE | Refills: 2 | Status: SHIPPED | OUTPATIENT
Start: 2022-01-24 | End: 2023-03-17

## 2022-01-24 NOTE — TELEPHONE ENCOUNTER
Rx Refill Note  Requested Prescriptions     Pending Prescriptions Disp Refills   • vitamin D (ERGOCALCIFEROL) 1.25 MG (67595 UT) capsule capsule [Pharmacy Med Name: VITAMIN D2 1.25MG(50,000 UNIT)] 4 capsule 2     Sig: TAKE 1 CAPSULE BY MOUTH 1 (ONE) TIME PER WEEK FOR 12 DOSES. CALL TO RECHECK VITAMIN D LEVEL WHEN FINISHED.      Last office visit with prescribing clinician: 11/2/2021      Next office visit with prescribing clinician: 2/3/2022            Fouzia Walsh MA  01/24/22, 08:26 EST

## 2022-01-25 LAB
BACTERIA UR QL AUTO: ABNORMAL /HPF
HYALINE CASTS UR QL AUTO: ABNORMAL /LPF
RBC # UR STRIP: ABNORMAL /HPF
REF LAB TEST METHOD: ABNORMAL
SQUAMOUS #/AREA URNS HPF: ABNORMAL /HPF
WBC # UR STRIP: ABNORMAL /HPF

## 2022-02-16 ENCOUNTER — OFFICE VISIT (OUTPATIENT)
Dept: FAMILY MEDICINE CLINIC | Facility: CLINIC | Age: 62
End: 2022-02-16

## 2022-02-16 VITALS
HEIGHT: 62 IN | BODY MASS INDEX: 31.98 KG/M2 | OXYGEN SATURATION: 95 % | DIASTOLIC BLOOD PRESSURE: 84 MMHG | RESPIRATION RATE: 14 BRPM | SYSTOLIC BLOOD PRESSURE: 128 MMHG | WEIGHT: 173.8 LBS | TEMPERATURE: 97.3 F | HEART RATE: 76 BPM

## 2022-02-16 DIAGNOSIS — R21 RASH AND NONSPECIFIC SKIN ERUPTION: Primary | ICD-10-CM

## 2022-02-16 PROCEDURE — 99213 OFFICE O/P EST LOW 20 MIN: CPT | Performed by: PHYSICIAN ASSISTANT

## 2022-02-16 RX ORDER — HYDROXYZINE HYDROCHLORIDE 25 MG/1
25 TABLET, FILM COATED ORAL EVERY 8 HOURS PRN
Qty: 30 TABLET | Refills: 0 | Status: SHIPPED | OUTPATIENT
Start: 2022-02-16 | End: 2022-03-21

## 2022-02-16 NOTE — PROGRESS NOTES
"    Chief Complaint   Patient presents with   • rash on abdomen and back that burn and itch       HPI     Bianka Fisher is a pleasant 61 y.o. female who presents for evaluation of \"chief complaint.\"   Patient c/o of a rash on her abdomen and back started this morning. Red small bumps, itching and burning, hydrocortisone helped mildly. Denies new medications, topical skin products, marx in laundry detergent. Denies recent colds, new sexual partners, or history of STIs. She is established with dermatology.    Past Medical History:   Diagnosis Date   • Anxiety    • Asthma    • GERD (gastroesophageal reflux disease)    • Heart murmur    • Hyperlipidemia    • Hypertension    • Insomnia    • Migraine    • Peripheral neuropathy    • Skin cancer    • Tremors of nervous system        Past Surgical History:   Procedure Laterality Date   • BREAST BIOPSY Right 2017   •  SECTION     • CHOLECYSTECTOMY     • COLONOSCOPY     • TONSILLECTOMY     • TONSILLECTOMY         Family History   Problem Relation Age of Onset   • Lung cancer Mother    • Cancer Mother    • Skin cancer Sister    • Alcohol abuse Sister    • Alcohol abuse Brother    • Dementia Maternal Aunt    • Alzheimer's disease Maternal Uncle    • Cancer Maternal Uncle    • Mental illness Maternal Uncle    • Alzheimer's disease Paternal Aunt    • Cancer Maternal Grandmother    • Heart disease Maternal Grandfather    • Breast cancer Neg Hx    • Endometrial cancer Neg Hx    • Ovarian cancer Neg Hx        Social History     Socioeconomic History   • Marital status:    Tobacco Use   • Smoking status: Never Smoker   • Smokeless tobacco: Never Used   Vaping Use   • Vaping Use: Never used   Substance and Sexual Activity   • Alcohol use: No   • Drug use: No     Comment: quit when pt was in 20's   • Sexual activity: Defer     Partners: Male       Allergies   Allergen Reactions   • Crestor [Rosuvastatin Calcium] Hives       ROS    Review of Systems "   Constitutional: Negative for chills and fever.   HENT: Negative for mouth sores and sore throat.    Skin: Positive for rash.       Vitals:    02/16/22 1150   BP: 128/84   Pulse: 76   Resp: 14   Temp: 97.3 °F (36.3 °C)   SpO2: 95%     Body mass index is 31.78 kg/m².      Current Outpatient Medications:   •  colestipol (COLESTID) 1 g tablet, Take 1 g by mouth 2 (Two) Times a Day., Disp: , Rfl:   •  fluticasone (Flonase) 50 MCG/ACT nasal spray, 1 spray into the nostril(s) as directed by provider Daily., Disp: 9.9 mL, Rfl: 11  •  hydrocortisone 2.5 % cream, , Disp: , Rfl:   •  losartan (COZAAR) 25 MG tablet, Take 1 tablet by mouth Daily., Disp: 90 tablet, Rfl: 3  •  Pitavastatin Calcium (Livalo) 4 MG tablet, Take 1 tablet by mouth every night at bedtime. ., Disp: 90 tablet, Rfl: 3  •  propranolol LA (INDERAL LA) 60 MG 24 hr capsule, Take 1 capsule by mouth Daily., Disp: 90 capsule, Rfl: 3  •  traZODone (DESYREL) 100 MG tablet, Take 1 tablet by mouth Every Night., Disp: 90 tablet, Rfl: 1  •  hydrOXYzine (ATARAX) 25 MG tablet, Take 1 tablet by mouth Every 8 (Eight) Hours As Needed for Itching., Disp: 30 tablet, Rfl: 0  •  triamcinolone (KENALOG) 0.1 % ointment, Apply 1 application topically to the appropriate area as directed 2 (Two) Times a Day for 14 days., Disp: 30 g, Rfl: 0  •  vitamin D (ERGOCALCIFEROL) 1.25 MG (17794 UT) capsule capsule, TAKE 1 CAPSULE BY MOUTH 1 (ONE) TIME PER WEEK FOR 12 DOSES. CALL TO RECHECK VITAMIN D LEVEL WHEN FINISHED., Disp: 4 capsule, Rfl: 2    PE    Physical Exam  Vitals reviewed.   Constitutional:       General: She is not in acute distress.  Pulmonary:      Effort: Pulmonary effort is normal. No respiratory distress.   Skin:     Comments: Diffuse, fine, maculopapular erythematous rash with excoriation noted on trunk.    Neurological:      Mental Status: She is alert.   Psychiatric:         Mood and Affect: Mood normal.          A/P    Problem List Items Addressed This Visit     None       Visit Diagnoses     Rash and nonspecific skin eruption    -  Primary  -Unclear etiology at this time  -Trial topical triamcinolone and hydroxyzine as needed for pruritus  -Advised patient to make an appointment with her dermatologist for follow-up for return here if symptoms worsen    Relevant Medications    hydrocortisone 2.5 % cream    triamcinolone (KENALOG) 0.1 % ointment          Plan of care was reviewed with patient at the conclusion of today's visit. Education was provided regarding diagnoses, management, prescribed or recommended OTC products, and the importance of compliance with follow-up appointments. The patient was counseled regarding the risks, benefits, and possible side-effects of treatment. I advised the patient to keep me informed of any acute changes in their status including new, worsening, or persistent symptoms. Patient expresses understanding and agreement with the management plan.        BRAD Bennett

## 2022-02-24 ENCOUNTER — TELEPHONE (OUTPATIENT)
Dept: GASTROENTEROLOGY | Facility: CLINIC | Age: 62
End: 2022-02-24

## 2022-03-21 ENCOUNTER — OFFICE VISIT (OUTPATIENT)
Dept: CARDIOLOGY | Facility: CLINIC | Age: 62
End: 2022-03-21

## 2022-03-21 VITALS
DIASTOLIC BLOOD PRESSURE: 72 MMHG | OXYGEN SATURATION: 97 % | HEIGHT: 62 IN | BODY MASS INDEX: 30.62 KG/M2 | SYSTOLIC BLOOD PRESSURE: 122 MMHG | HEART RATE: 65 BPM | WEIGHT: 166.4 LBS

## 2022-03-21 DIAGNOSIS — E78.2 MIXED HYPERLIPIDEMIA: ICD-10-CM

## 2022-03-21 DIAGNOSIS — I73.9 CLAUDICATION: ICD-10-CM

## 2022-03-21 DIAGNOSIS — I70.0 ATHEROSCLEROSIS OF AORTA: Primary | ICD-10-CM

## 2022-03-21 DIAGNOSIS — I10 ESSENTIAL HYPERTENSION: ICD-10-CM

## 2022-03-21 PROCEDURE — 99214 OFFICE O/P EST MOD 30 MIN: CPT | Performed by: INTERNAL MEDICINE

## 2022-03-21 RX ORDER — NITROGLYCERIN 0.4 MG/1
TABLET SUBLINGUAL
Qty: 25 TABLET | Refills: 3 | Status: SHIPPED | OUTPATIENT
Start: 2022-03-21 | End: 2023-03-14

## 2022-03-21 RX ORDER — EZETIMIBE 10 MG/1
10 TABLET ORAL DAILY
Qty: 90 TABLET | Refills: 3 | Status: SHIPPED | OUTPATIENT
Start: 2022-03-21 | End: 2023-03-24

## 2022-03-21 NOTE — PROGRESS NOTES
"[facility] at [facility]  Office visit  Bianka Fisher  1960  [phone]    VISIT DATE:  3/21/2022      PCP: David Mckeon DO  [id number] [facility] [address]    CC:  Chief Complaint   Patient presents with   • Essential hypertension     Previous cardiac studies and procedures:  August 2017  ABIs: Normal  Stress echocardiogram  · Pt. Describes chest discomfort as \" my chest feels tired\" resolved in recovery  · Preserved functional capacity  · Left ventricular systolic function is normal.  · Normal stress echo with no significant echocardiographic evidence for myocardial ischemia.      August 2020 exercise myocardial perfusion imaging  · Mild calcifications visualized in the proximal LAD. Moderate aortic arch calcification visualized.  · Pt denied chest pain, pressure. Reported slight SOA.  · Expected exercise time: 6:55, actual time: 5:21  · No ECG evidence of myocardial ischemia  · Left ventricular ejection fraction is normal (Calculated EF = 68%).  · Myocardial perfusion imaging indicates a normal myocardial perfusion study with no evidence of ischemia.  · Impressions are consistent with a low risk study.      ASSESSMENT:   Diagnosis Plan   1. Atherosclerosis of aorta (HCC)     2. Claudication (HCC)     3. Essential hypertension     4. Mixed hyperlipidemia         PLAN:  Hyperlipidemia: Continue pitavastatin 4 mg p.o. daily, goal LDL less than 100, intolerance to multiple other statins.  Starting Zetia 10 mg p.o. daily.    Hypertension: Continue propranolol(predominantly being used for essential tremor). Continue losartan 50 mg p.o. daily, goal blood pressure less than 130/80    Atypical angina: Showing ischemia evaluation, cannot exclude episodes of vasospasm for more severe episodes.  She was given a prescription for sublingual nitroglycerin to use as needed.    Coronary and aortic calcifications: Continue aggressive risk factor modification.  Not recommending " "antiplatelet therapy at this time.  Continue afterload reduction, titrating pharmacologic therapy for hyperlipidemia.  Encouraged regular exercise and dietary modifications for healthy diet.  Discussed developing diabetes.    Subjective  Blood pressures probably running less than 140/90 mmHg.. Discussed results of recent stress testing.  She is compliant with medical therapy.  Reviewed recent lipid profile.  Intermittent mild chest discomfort.  She had one episode which was severe which woke her up in the middle of the night.  No significant exertional chest discomfort or limiting dyspnea.    PHYSICAL EXAMINATION:  Vitals:    03/21/22 1459   BP: 122/72   BP Location: Left arm   Patient Position: Sitting   Pulse: 65   SpO2: 97%   Weight: 75.5 kg (166 lb 6.4 oz)   Height: 157.5 cm (62\")     General Appearance:    Alert, cooperative, no distress, appears stated age   Head:    Normocephalic, without obvious abnormality, atraumatic   Eyes:    conjunctiva/corneas clear   Nose:   Nares normal, septum midline, mucosa normal, no drainage   Throat:   Lips, teeth and gums normal   Neck:   Supple, symmetrical, trachea midline, no carotid    bruit or JVD   Lungs:     Clear to auscultation bilaterally, respirations unlabored   Chest Wall:    No tenderness or deformity    Heart:    Regular rate and rhythm, S1 and S2 normal, no murmur, rub   or gallop, normal carotid impulse bilaterally without bruit.   Abdomen:     Soft, non-tender   Extremities:   Extremities normal, atraumatic, no cyanosis or edema   Pulses:   2+ and symmetric all extremities   Skin:   Skin color, texture, turgor normal, no rashes or lesions       Diagnostic Data:  Procedures  Lab Results   Component Value Date    TRIG 96 01/24/2022    HDL 49 01/24/2022     Lab Results   Component Value Date    GLUCOSE 90 01/24/2022    BUN 10 01/24/2022    CREATININE 1.14 (H) 01/24/2022     01/24/2022    K 4.7 01/24/2022     01/24/2022    CO2 27.2 01/24/2022     Lab " Results   Component Value Date    HGBA1C 6.46 (H) 01/24/2022     Lab Results   Component Value Date    WBC 7.67 01/24/2022    HGB 13.4 01/24/2022    HCT 41.3 01/24/2022     01/24/2022       Allergies  Allergies   Allergen Reactions   • Crestor [Rosuvastatin Calcium] Hives       Current Medications    Current Outpatient Medications:   •  colestipol (COLESTID) 1 g tablet, Take 1 g by mouth 2 (Two) Times a Day., Disp: , Rfl:   •  fluticasone (Flonase) 50 MCG/ACT nasal spray, 1 spray into the nostril(s) as directed by provider Daily. (Patient taking differently: 1 spray into the nostril(s) as directed by provider As Needed.), Disp: 9.9 mL, Rfl: 11  •  losartan (COZAAR) 25 MG tablet, Take 1 tablet by mouth Daily., Disp: 90 tablet, Rfl: 3  •  Pitavastatin Calcium (Livalo) 4 MG tablet, Take 1 tablet by mouth every night at bedtime. ., Disp: 90 tablet, Rfl: 3  •  propranolol LA (INDERAL LA) 60 MG 24 hr capsule, Take 1 capsule by mouth Daily., Disp: 90 capsule, Rfl: 3  •  traZODone (DESYREL) 100 MG tablet, Take 1 tablet by mouth Every Night., Disp: 90 tablet, Rfl: 1  •  vitamin D (ERGOCALCIFEROL) 1.25 MG (00768 UT) capsule capsule, TAKE 1 CAPSULE BY MOUTH 1 (ONE) TIME PER WEEK FOR 12 DOSES. CALL TO RECHECK VITAMIN D LEVEL WHEN FINISHED., Disp: 4 capsule, Rfl: 2          ROS  Review of Systems   Cardiovascular: Positive for chest pain and leg swelling. Negative for dyspnea on exertion and palpitations.   Respiratory: Negative for cough, shortness of breath, snoring and wheezing.    Neurological: Positive for dizziness.   Allergic/Immunologic: Positive for environmental allergies.       SOCIAL HX  Social History     Socioeconomic History   • Marital status:    Tobacco Use   • Smoking status: Never Smoker   • Smokeless tobacco: Never Used   Vaping Use   • Vaping Use: Never used   Substance and Sexual Activity   • Alcohol use: No   • Drug use: No     Comment: quit when pt was in 20's   • Sexual activity: Defer      Partners: Male       FAMILY HX  Family History   Problem Relation Age of Onset   • Lung cancer Mother    • Cancer Mother    • Skin cancer Sister    • Alcohol abuse Sister    • Alcohol abuse Brother    • Dementia Maternal Aunt    • Alzheimer's disease Maternal Uncle    • Cancer Maternal Uncle    • Mental illness Maternal Uncle    • Alzheimer's disease Paternal Aunt    • Cancer Maternal Grandmother    • Heart disease Maternal Grandfather    • Breast cancer Neg Hx    • Endometrial cancer Neg Hx    • Ovarian cancer Neg Hx              Valentino Bates III, MD, FACC

## 2022-03-29 ENCOUNTER — OFFICE VISIT (OUTPATIENT)
Dept: GASTROENTEROLOGY | Facility: CLINIC | Age: 62
End: 2022-03-29

## 2022-03-29 VITALS
TEMPERATURE: 97.3 F | SYSTOLIC BLOOD PRESSURE: 136 MMHG | OXYGEN SATURATION: 99 % | HEART RATE: 66 BPM | BODY MASS INDEX: 31.65 KG/M2 | WEIGHT: 172 LBS | DIASTOLIC BLOOD PRESSURE: 80 MMHG | HEIGHT: 62 IN

## 2022-03-29 DIAGNOSIS — Z80.0 FAMILY HISTORY OF COLON CANCER REQUIRING SCREENING COLONOSCOPY: ICD-10-CM

## 2022-03-29 DIAGNOSIS — R19.7 DIARRHEA, UNSPECIFIED TYPE: Primary | ICD-10-CM

## 2022-03-29 DIAGNOSIS — R10.9 ABDOMINAL PAIN, UNSPECIFIED ABDOMINAL LOCATION: ICD-10-CM

## 2022-03-29 PROCEDURE — 99213 OFFICE O/P EST LOW 20 MIN: CPT | Performed by: INTERNAL MEDICINE

## 2022-03-29 NOTE — PROGRESS NOTES
Patient Name: Bianka Fisher  YOB: 1960   Medical Record number: [id number]     Chief Complaint: Diarrhea      HPI Bianka is an established patient of mine.  She has bile salt induced diarrhea.  She also has some element of IBS.  She is responded very nicely to colestipol in the past.  She is taking medication twice a day.  She is status post cholecystectomy.  She is here for follow-up    Past Medical History:   Past Medical History:   Diagnosis Date   • Anxiety    • Asthma    • GERD (gastroesophageal reflux disease)    • Heart murmur    • Hyperlipidemia    • Hypertension    • Insomnia    • Migraine    • Peripheral neuropathy    • Skin cancer    • Tremors of nervous system        Family History:  Family History   Problem Relation Age of Onset   • Lung cancer Mother    • Cancer Mother    • Skin cancer Sister    • Alcohol abuse Sister    • Alcohol abuse Brother    • Dementia Maternal Aunt    • Alzheimer's disease Maternal Uncle    • Cancer Maternal Uncle    • Mental illness Maternal Uncle    • Alzheimer's disease Paternal Aunt    • Cancer Maternal Grandmother    • Heart disease Maternal Grandfather    • Breast cancer Neg Hx    • Endometrial cancer Neg Hx    • Ovarian cancer Neg Hx        Social History:   reports that she has never smoked. She has never used smokeless tobacco. She reports that she does not drink alcohol and does not use drugs.    Medications:     Current Outpatient Medications:   •  colestipol (COLESTID) 1 g tablet, Take 1 g by mouth 2 (Two) Times a Day., Disp: , Rfl:   •  ezetimibe (ZETIA) 10 MG tablet, Take 1 tablet by mouth Daily., Disp: 90 tablet, Rfl: 3  •  losartan (COZAAR) 25 MG tablet, Take 1 tablet by mouth Daily., Disp: 90 tablet, Rfl: 3  •  nitroglycerin (NITROSTAT) 0.4 MG SL tablet, 1 under the tongue as needed for angina, may repeat q5mins for up three doses, Disp: 25 tablet, Rfl: 3  •  Pitavastatin Calcium (Livalo) 4 MG tablet, Take 1 tablet by mouth every night at  "bedtime. ., Disp: 90 tablet, Rfl: 3  •  propranolol LA (INDERAL LA) 60 MG 24 hr capsule, Take 1 capsule by mouth Daily., Disp: 90 capsule, Rfl: 3  •  traZODone (DESYREL) 100 MG tablet, Take 1 tablet by mouth Every Night., Disp: 90 tablet, Rfl: 1  •  vitamin D (ERGOCALCIFEROL) 1.25 MG (37995 UT) capsule capsule, TAKE 1 CAPSULE BY MOUTH 1 (ONE) TIME PER WEEK FOR 12 DOSES. CALL TO RECHECK VITAMIN D LEVEL WHEN FINISHED., Disp: 4 capsule, Rfl: 2  •  fluticasone (Flonase) 50 MCG/ACT nasal spray, 1 spray into the nostril(s) as directed by provider Daily. (Patient taking differently: 1 spray into the nostril(s) as directed by provider As Needed.), Disp: 9.9 mL, Rfl: 11    Allergies:  Crestor [rosuvastatin calcium]    Review of Systems   Constitutional: Negative for activity change, appetite change, fatigue, fever and unexpected weight change.   HENT: Negative for hearing loss, trouble swallowing and voice change.    Eyes: Negative for visual disturbance.   Respiratory: Negative for cough, choking, chest tightness and shortness of breath.    Cardiovascular: Negative for chest pain.   Gastrointestinal: Positive for abdominal distention (bloating), abdominal pain and diarrhea. Negative for anal bleeding, blood in stool, constipation, nausea, rectal pain and vomiting.        Heartburn   Endocrine: Negative for polydipsia and polyphagia.   Genitourinary: Negative.    Musculoskeletal: Negative for gait problem and joint swelling.   Skin: Negative for color change and rash.   Allergic/Immunologic: Negative for food allergies.   Neurological: Negative for dizziness, seizures and speech difficulty.   Hematological: Negative for adenopathy.   Psychiatric/Behavioral: Negative for confusion.         Vitals:   /80 (BP Location: Left arm, Patient Position: Sitting, Cuff Size: Adult)   Pulse 66   Temp 97.3 °F (36.3 °C) (Temporal)   Ht 157.5 cm (62\")   Wt 78 kg (172 lb)   LMP 02/01/2016   SpO2 99%   BMI 31.46 kg/m²  "     Physical Exam:   Constitutional: Pt is oriented to person, place, and time and well-developed, well-nourished, and in no distress.     Skin: Skin is warm and dry.   Psychiatric: Mood, memory, affect and judgment normal.           Assessment and Plan Bianka has bile salt induced diarrhea.  She also has an element of IBS.  She was complaining of some bloating and some upper abdominal swelling and discomfort at times.  We reviewed this in detail.  It was consistent with postprandial distention related to her IBS.  Her colestipol was refilled for a year.  I also recommended that she start on align.  I think this will help the bloating.  All of her questions were answered again her medications were refilled at least 20 minutes of time was spent before during and after the visit and the appropriate [id number] was billed        ICD-10-CM ICD-9-CM   1. Diarrhea, unspecified type  R19.7 787.91   2. Family history of colon cancer requiring screening colonoscopy  Z80.0 V16.0   3. Abdominal pain, unspecified abdominal location  R10.9 789.00     Trav Bob M.D.  [facility]   Please note that portions of this note were completed with a voice recognition program.

## 2022-04-25 DIAGNOSIS — F51.01 PRIMARY INSOMNIA: ICD-10-CM

## 2022-04-25 RX ORDER — TRAZODONE HYDROCHLORIDE 100 MG/1
TABLET ORAL
Qty: 90 TABLET | Refills: 1 | Status: SHIPPED | OUTPATIENT
Start: 2022-04-25 | End: 2022-10-06

## 2022-04-25 NOTE — TELEPHONE ENCOUNTER
Rx Refill Note  Requested Prescriptions     Pending Prescriptions Disp Refills   • traZODone (DESYREL) 100 MG tablet [Pharmacy Med Name: TRAZODONE 100 MG TABLET] 30 tablet 5     Sig: TAKE 1 TABLET BY MOUTH EVERY DAY AT NIGHT      Last office visit with prescribing clinician: 11/2/2021      Next office visit with prescribing clinician: 4/26/2022            Tyree Leo MA  04/25/22, 14:17 EDT

## 2022-04-26 ENCOUNTER — LAB (OUTPATIENT)
Dept: LAB | Facility: HOSPITAL | Age: 62
End: 2022-04-26

## 2022-04-26 ENCOUNTER — OFFICE VISIT (OUTPATIENT)
Dept: FAMILY MEDICINE CLINIC | Facility: CLINIC | Age: 62
End: 2022-04-26

## 2022-04-26 VITALS
HEIGHT: 62 IN | RESPIRATION RATE: 18 BRPM | WEIGHT: 171.2 LBS | BODY MASS INDEX: 31.5 KG/M2 | SYSTOLIC BLOOD PRESSURE: 114 MMHG | OXYGEN SATURATION: 97 % | DIASTOLIC BLOOD PRESSURE: 70 MMHG | HEART RATE: 68 BPM

## 2022-04-26 DIAGNOSIS — R73.9 HYPERGLYCEMIA: ICD-10-CM

## 2022-04-26 DIAGNOSIS — R79.89 ELEVATED SERUM CREATININE: ICD-10-CM

## 2022-04-26 DIAGNOSIS — I10 ESSENTIAL HYPERTENSION: ICD-10-CM

## 2022-04-26 DIAGNOSIS — Z00.00 PREVENTATIVE HEALTH CARE: Primary | ICD-10-CM

## 2022-04-26 DIAGNOSIS — E55.9 VITAMIN D DEFICIENCY: ICD-10-CM

## 2022-04-26 DIAGNOSIS — E66.09 CLASS 1 OBESITY DUE TO EXCESS CALORIES WITH SERIOUS COMORBIDITY AND BODY MASS INDEX (BMI) OF 31.0 TO 31.9 IN ADULT: ICD-10-CM

## 2022-04-26 DIAGNOSIS — G25.0 TREMOR, ESSENTIAL: ICD-10-CM

## 2022-04-26 DIAGNOSIS — E78.2 MIXED HYPERLIPIDEMIA: ICD-10-CM

## 2022-04-26 DIAGNOSIS — H92.03 EAR PAIN, BILATERAL: ICD-10-CM

## 2022-04-26 PROBLEM — E66.811 CLASS 1 OBESITY DUE TO EXCESS CALORIES WITH SERIOUS COMORBIDITY AND BODY MASS INDEX (BMI) OF 31.0 TO 31.9 IN ADULT: Status: ACTIVE | Noted: 2022-04-26

## 2022-04-26 LAB
ANION GAP SERPL CALCULATED.3IONS-SCNC: 11.3 MMOL/L (ref 5–15)
BUN SERPL-MCNC: 10 MG/DL (ref 8–23)
BUN/CREAT SERPL: 9.1 (ref 7–25)
CALCIUM SPEC-SCNC: 9.6 MG/DL (ref 8.6–10.5)
CHLORIDE SERPL-SCNC: 106 MMOL/L (ref 98–107)
CO2 SERPL-SCNC: 24.7 MMOL/L (ref 22–29)
CREAT SERPL-MCNC: 1.1 MG/DL (ref 0.57–1)
EGFRCR SERPLBLD CKD-EPI 2021: 56.9 ML/MIN/1.73
GLUCOSE SERPL-MCNC: 97 MG/DL (ref 65–99)
HBA1C MFR BLD: 5.4 % (ref 4.8–5.6)
POTASSIUM SERPL-SCNC: 4.8 MMOL/L (ref 3.5–5.2)
SODIUM SERPL-SCNC: 142 MMOL/L (ref 136–145)

## 2022-04-26 PROCEDURE — 80048 BASIC METABOLIC PNL TOTAL CA: CPT

## 2022-04-26 PROCEDURE — 99396 PREV VISIT EST AGE 40-64: CPT | Performed by: FAMILY MEDICINE

## 2022-04-26 PROCEDURE — 83036 HEMOGLOBIN GLYCOSYLATED A1C: CPT

## 2022-04-26 RX ORDER — LOSARTAN POTASSIUM 25 MG/1
25 TABLET ORAL DAILY
Qty: 90 TABLET | Refills: 3 | Status: SHIPPED | OUTPATIENT
Start: 2022-04-26 | End: 2022-10-06

## 2022-04-26 RX ORDER — FLUTICASONE PROPIONATE 50 MCG
1 SPRAY, SUSPENSION (ML) NASAL DAILY
Qty: 16 G | Refills: 11 | Status: SHIPPED | OUTPATIENT
Start: 2022-04-26

## 2022-04-26 RX ORDER — PITAVASTATIN CALCIUM 4.18 MG/1
1 TABLET, FILM COATED ORAL
Qty: 90 TABLET | Refills: 3 | Status: SHIPPED | OUTPATIENT
Start: 2022-04-26 | End: 2022-10-06

## 2022-04-26 RX ORDER — SEMAGLUTIDE 0.25 MG/.5ML
0.25 INJECTION, SOLUTION SUBCUTANEOUS WEEKLY
Qty: 2 ML | Refills: 0 | Status: SHIPPED | OUTPATIENT
Start: 2022-04-26 | End: 2022-05-18

## 2022-04-26 RX ORDER — PROPRANOLOL HCL 60 MG
60 CAPSULE, EXTENDED RELEASE 24HR ORAL DAILY
Qty: 90 CAPSULE | Refills: 3 | Status: SHIPPED | OUTPATIENT
Start: 2022-04-26 | End: 2022-10-06

## 2022-04-26 RX ORDER — MULTIVIT-MIN/IRON/FOLIC ACID/K 18-600-40
2000 CAPSULE ORAL DAILY
Qty: 90 CAPSULE | Refills: 3 | Status: SHIPPED | OUTPATIENT
Start: 2022-04-26 | End: 2023-03-17 | Stop reason: SDUPTHER

## 2022-04-28 NOTE — PROGRESS NOTES
Your blood sugar has actually gotten quite a bit better over the last 3 months.  It is out of the prediabetic range and in the normal range.  Your kidney function has remained stable over the last 5 years.  It continues to be very slightly impaired, but consistent with age and health history.  Nothing to be acutely concerned about.

## 2022-06-09 ENCOUNTER — OFFICE VISIT (OUTPATIENT)
Dept: FAMILY MEDICINE CLINIC | Facility: CLINIC | Age: 62
End: 2022-06-09

## 2022-06-09 VITALS
DIASTOLIC BLOOD PRESSURE: 90 MMHG | HEART RATE: 57 BPM | HEIGHT: 62 IN | SYSTOLIC BLOOD PRESSURE: 132 MMHG | BODY MASS INDEX: 31.29 KG/M2 | OXYGEN SATURATION: 95 %

## 2022-06-09 DIAGNOSIS — J32.4 PANSINUSITIS, UNSPECIFIED CHRONICITY: Primary | ICD-10-CM

## 2022-06-09 DIAGNOSIS — J30.2 SEASONAL ALLERGIES: ICD-10-CM

## 2022-06-09 PROCEDURE — 99214 OFFICE O/P EST MOD 30 MIN: CPT | Performed by: INTERNAL MEDICINE

## 2022-06-09 RX ORDER — METHYLPREDNISOLONE 4 MG/1
TABLET ORAL
Qty: 21 TABLET | Refills: 0 | Status: SHIPPED | OUTPATIENT
Start: 2022-06-09 | End: 2022-12-30

## 2022-06-09 RX ORDER — AMOXICILLIN AND CLAVULANATE POTASSIUM 875; 125 MG/1; MG/1
1 TABLET, FILM COATED ORAL 2 TIMES DAILY
Qty: 14 TABLET | Refills: 0 | Status: SHIPPED | OUTPATIENT
Start: 2022-06-09 | End: 2022-06-16

## 2022-06-09 NOTE — PROGRESS NOTES
Chief Complaint   Patient presents with   • URI     Head congestion, sore throat, headache x a few weeks. Had negative test this morning. Lingering effects from covid 2 weeks ago        HPI:  Bianka Fisher is a 62 y.o. female who presents today for sinus congestion, headache, runny nose for the past few weeks.  Diagnosed with COVID last month.  Symptoms have been lingering on since then.  Continues to have loss of taste and smell.    ROS:  Constitutional: no fevers, night sweats or unexplained weight loss  Eyes: no vision changes  ENT: + runny nose, +ear pain, +sore throat  Cardio: no chest pain, palpitations  Pulm: no shortness of breath, wheezing, or cough  GI: no abdominal pain or changes in bowel movements  : no difficulty urinating  MSK: no difficulty ambulating, no joint pain  Neuro: no weakness, dizziness + headache  Psych: no trouble sleeping  Endo: no change in appetite      Past Medical History:   Diagnosis Date   • Anxiety    • Asthma    • GERD (gastroesophageal reflux disease)    • Heart murmur    • Hyperlipidemia    • Hypertension    • Insomnia    • Migraine    • Peripheral neuropathy    • Skin cancer    • Tremors of nervous system       Family History   Problem Relation Age of Onset   • Lung cancer Mother    • Cancer Mother    • Skin cancer Sister    • Alcohol abuse Sister    • Alcohol abuse Brother    • Dementia Maternal Aunt    • Alzheimer's disease Maternal Uncle    • Cancer Maternal Uncle    • Mental illness Maternal Uncle    • Alzheimer's disease Paternal Aunt    • Cancer Maternal Grandmother    • Heart disease Maternal Grandfather    • Breast cancer Neg Hx    • Endometrial cancer Neg Hx    • Ovarian cancer Neg Hx       Social History     Socioeconomic History   • Marital status:    Tobacco Use   • Smoking status: Never Smoker   • Smokeless tobacco: Never Used   Vaping Use   • Vaping Use: Never used   Substance and Sexual Activity   • Alcohol use: No   • Drug use: No     Comment: quit  when pt was in 20's   • Sexual activity: Defer     Partners: Male      Allergies   Allergen Reactions   • Crestor [Rosuvastatin Calcium] Hives      Immunization History   Administered Date(s) Administered   • Flu Vaccine Intradermal Quad 18-64YR 10/09/2015   • Flu Vaccine Quad PF >36MO 10/10/2016, 11/03/2017, 10/22/2018, 10/04/2019, 10/14/2020   • Flu Vaccine Split Quad 10/10/2016, 11/03/2017, 10/22/2018, 10/04/2019, 10/14/2020   • Influenza, Unspecified 09/24/2014, 10/09/2015   • Pneumococcal Polysaccharide (PPSV23) 10/05/2010   • Tdap 07/22/2010        PE:  Vitals:    06/09/22 1124   BP: 132/90   Pulse: 57   SpO2: 95%      Body mass index is 31.29 kg/m².    Gen Appearance: NAD  HEENT: Normocephalic, PERRLA, no thyromegaly, trache midline  Heart: RRR, normal S1 and S2, no murmur  Lungs: CTA b/l, no wheezing, no crackles  Abdomen: Soft, non-tender, non-distended, no guarding and BSx4  MSK: Moves all extremities well, normal gait, no peripheral edema  Pulses: Palpable and equal b/l  Lymph nodes: No palpable lymphadenopathy   Neuro: No focal deficits      Current Outpatient Medications   Medication Sig Dispense Refill   • buPROPion SR (Wellbutrin SR) 100 MG 12 hr tablet Take 1 tablet by mouth Daily. 90 tablet 0   • colestipol (COLESTID) 1 g tablet Take 1 g by mouth 2 (Two) Times a Day.     • ezetimibe (ZETIA) 10 MG tablet Take 1 tablet by mouth Daily. 90 tablet 3   • fluticasone (Flonase) 50 MCG/ACT nasal spray 1 spray into the nostril(s) as directed by provider Daily. 16 g 11   • losartan (COZAAR) 25 MG tablet Take 1 tablet by mouth Daily. 90 tablet 3   • naltrexone (DEPADE) 50 MG tablet Take 0.5 tablets by mouth Daily. 45 tablet 0   • nitroglycerin (NITROSTAT) 0.4 MG SL tablet 1 under the tongue as needed for angina, may repeat q5mins for up three doses 25 tablet 3   • Pitavastatin Calcium (Livalo) 4 MG tablet Take 1 tablet by mouth every night at bedtime. . 90 tablet 3   • propranolol LA (INDERAL LA) 60 MG 24 hr  capsule Take 1 capsule by mouth Daily. 90 capsule 3   • traZODone (DESYREL) 100 MG tablet TAKE 1 TABLET BY MOUTH EVERY DAY AT NIGHT 90 tablet 1   • Vitamin D, Cholecalciferol, 50 MCG (2000 UT) capsule Take 1 capsule by mouth Daily. 90 capsule 3   • amoxicillin-clavulanate (Augmentin) 875-125 MG per tablet Take 1 tablet by mouth 2 (Two) Times a Day for 7 days. 14 tablet 0   • methylPREDNISolone (MEDROL) 4 MG dose pack Take as directed on package instructions. 21 tablet 0     No current facility-administered medications for this visit.      Treat for sinusitis with Augmentin.  Medrol Dosepak for worsening allergies.  Continue Flonase daily.  May benefit from daily antihistamine as well.    Diagnoses and all orders for this visit:    1. Pansinusitis, unspecified chronicity (Primary)  -     amoxicillin-clavulanate (Augmentin) 875-125 MG per tablet; Take 1 tablet by mouth 2 (Two) Times a Day for 7 days.  Dispense: 14 tablet; Refill: 0  -     methylPREDNISolone (MEDROL) 4 MG dose pack; Take as directed on package instructions.  Dispense: 21 tablet; Refill: 0    2. Seasonal allergies         No follow-ups on file.     Dictated Utilizing Dragon Dictation    Please note that portions of this note were completed with a voice recognition program.    Part of this note may be an electronic transcription/translation of spoken language to printed text using the Dragon Dictation System.

## 2022-06-26 DIAGNOSIS — I10 ESSENTIAL HYPERTENSION: ICD-10-CM

## 2022-06-26 DIAGNOSIS — G25.0 TREMOR, ESSENTIAL: ICD-10-CM

## 2022-06-27 RX ORDER — PROPRANOLOL HCL 60 MG
CAPSULE, EXTENDED RELEASE 24HR ORAL
Qty: 90 CAPSULE | Refills: 3 | OUTPATIENT
Start: 2022-06-27

## 2022-06-27 RX ORDER — LOSARTAN POTASSIUM 25 MG/1
TABLET ORAL
Qty: 90 TABLET | Refills: 3 | OUTPATIENT
Start: 2022-06-27

## 2022-06-27 NOTE — TELEPHONE ENCOUNTER
Rx Refill Note  Requested Prescriptions     Pending Prescriptions Disp Refills   • losartan (COZAAR) 25 MG tablet [Pharmacy Med Name: LOSARTAN POTASSIUM 25 MG TAB] 90 tablet 3     Sig: TAKE 1 TABLET BY MOUTH EVERY DAY   • propranolol LA (INDERAL LA) 60 MG 24 hr capsule [Pharmacy Med Name: PROPRANOLOL ER 60 MG CAPSULE] 90 capsule 3     Sig: TAKE 1 CAPSULE BY MOUTH EVERY DAY      Last office visit with prescribing clinician: 4/26/2022      Next office visit with prescribing clinician: Visit date not found            Tyree Leo MA  06/27/22, 11:01 EDT     Refill sent 4/26/22 #90 with 3 refills please check profile

## 2022-07-27 DIAGNOSIS — E66.09 CLASS 1 OBESITY DUE TO EXCESS CALORIES WITH SERIOUS COMORBIDITY AND BODY MASS INDEX (BMI) OF 31.0 TO 31.9 IN ADULT: ICD-10-CM

## 2022-07-27 RX ORDER — BUPROPION HYDROCHLORIDE 100 MG/1
TABLET, EXTENDED RELEASE ORAL
Qty: 30 TABLET | Refills: 2 | OUTPATIENT
Start: 2022-07-27

## 2022-07-27 NOTE — TELEPHONE ENCOUNTER
Rx Refill Note  Requested Prescriptions     Pending Prescriptions Disp Refills   • buPROPion SR (WELLBUTRIN SR) 100 MG 12 hr tablet [Pharmacy Med Name: BUPROPION HCL  MG TABLET] 30 tablet 2     Sig: TAKE 1 TABLET BY MOUTH EVERY DAY      Last office visit with prescribing clinician: 4/26/2022      Next office visit with prescribing clinician: Visit date not found            Tyree Leo MA  07/27/22, 11:44 EDT     Spoke with patient she says she is no longer taking it she got sick with Covid and hasn't been able to take it

## 2022-08-09 ENCOUNTER — TELEPHONE (OUTPATIENT)
Dept: GASTROENTEROLOGY | Facility: CLINIC | Age: 62
End: 2022-08-09

## 2022-08-09 NOTE — TELEPHONE ENCOUNTER
I TRIED TO CALL MS HERNÁNDEZ BACK TO GIVE DR BECKER'S RECOMMENDATION. NO ANSWER; NO VOICEMAIL SETUP. I WILL SEND MESSAGE TO MY CHART.

## 2022-08-09 NOTE — TELEPHONE ENCOUNTER
Dr Bob,    Ms Fisher called  Crying this morning stating that she is having frequent diarrhea which she is having accidents on herself. Patient is wondering if you will give her a doctors excuse for the rest of the week. Please advise. Thanks

## 2022-08-09 NOTE — TELEPHONE ENCOUNTER
Ms Fisher called back. Gave her Dr Bob's recommendation.She stated she will not take the Imodium because it didn't work before. Her  employer doesn't have bathrooms where she can wash off and change clothes when she has an accident. She just need to take off a few days until she gets to feeling better.

## 2022-08-29 ENCOUNTER — TELEPHONE (OUTPATIENT)
Dept: GASTROENTEROLOGY | Facility: CLINIC | Age: 62
End: 2022-08-29

## 2022-08-29 NOTE — TELEPHONE ENCOUNTER
The patient dropped off [facility] paperwork and requested that Dr. Bob fill it out. He declined to fill out the paperwork, and suggested her PCP take care of that. I called the patient and let her know, she will  the paperwork in the office. Paperwork given to medical assistants.

## 2022-10-05 DIAGNOSIS — I10 ESSENTIAL HYPERTENSION: ICD-10-CM

## 2022-10-05 DIAGNOSIS — G25.0 TREMOR, ESSENTIAL: ICD-10-CM

## 2022-10-05 DIAGNOSIS — E78.2 MIXED HYPERLIPIDEMIA: ICD-10-CM

## 2022-10-05 DIAGNOSIS — F51.01 PRIMARY INSOMNIA: ICD-10-CM

## 2022-10-06 RX ORDER — PROPRANOLOL HCL 60 MG
60 CAPSULE, EXTENDED RELEASE 24HR ORAL DAILY
Qty: 90 CAPSULE | Refills: 3 | OUTPATIENT
Start: 2022-10-06

## 2022-10-06 RX ORDER — LOSARTAN POTASSIUM 25 MG/1
TABLET ORAL
Qty: 90 TABLET | Refills: 3 | Status: SHIPPED | OUTPATIENT
Start: 2022-10-06

## 2022-10-06 RX ORDER — PROPRANOLOL HCL 60 MG
CAPSULE, EXTENDED RELEASE 24HR ORAL
Qty: 90 CAPSULE | Refills: 3 | Status: SHIPPED | OUTPATIENT
Start: 2022-10-06

## 2022-10-06 RX ORDER — LOSARTAN POTASSIUM 25 MG/1
25 TABLET ORAL DAILY
Qty: 90 TABLET | Refills: 3 | OUTPATIENT
Start: 2022-10-06

## 2022-10-06 RX ORDER — PITAVASTATIN CALCIUM 4.18 MG/1
TABLET, FILM COATED ORAL
Qty: 90 TABLET | Refills: 3 | Status: SHIPPED | OUTPATIENT
Start: 2022-10-06

## 2022-10-06 RX ORDER — TRAZODONE HYDROCHLORIDE 100 MG/1
TABLET ORAL
Qty: 30 TABLET | Refills: 5 | Status: SHIPPED | OUTPATIENT
Start: 2022-10-06 | End: 2022-11-14 | Stop reason: SDUPTHER

## 2022-11-14 DIAGNOSIS — F51.01 PRIMARY INSOMNIA: ICD-10-CM

## 2022-11-14 RX ORDER — TRAZODONE HYDROCHLORIDE 100 MG/1
100 TABLET ORAL
Qty: 30 TABLET | Refills: 5 | Status: SHIPPED | OUTPATIENT
Start: 2022-11-14

## 2022-12-05 RX ORDER — MONTELUKAST SODIUM 4 MG/1
1 TABLET, CHEWABLE ORAL 2 TIMES DAILY
Qty: 180 TABLET | Refills: 3 | Status: SHIPPED | OUTPATIENT
Start: 2022-12-05

## 2022-12-30 ENCOUNTER — LAB (OUTPATIENT)
Dept: LAB | Facility: HOSPITAL | Age: 62
End: 2022-12-30
Payer: COMMERCIAL

## 2022-12-30 ENCOUNTER — OFFICE VISIT (OUTPATIENT)
Dept: CARDIOLOGY | Facility: CLINIC | Age: 62
End: 2022-12-30

## 2022-12-30 VITALS
HEART RATE: 58 BPM | BODY MASS INDEX: 30.91 KG/M2 | SYSTOLIC BLOOD PRESSURE: 118 MMHG | DIASTOLIC BLOOD PRESSURE: 78 MMHG | HEIGHT: 62 IN | OXYGEN SATURATION: 97 % | WEIGHT: 168 LBS

## 2022-12-30 DIAGNOSIS — I10 ESSENTIAL HYPERTENSION: ICD-10-CM

## 2022-12-30 DIAGNOSIS — I70.0 ATHEROSCLEROSIS OF AORTA: Primary | ICD-10-CM

## 2022-12-30 DIAGNOSIS — E78.2 MIXED HYPERLIPIDEMIA: ICD-10-CM

## 2022-12-30 LAB
ALBUMIN SERPL-MCNC: 4.4 G/DL (ref 3.5–5.2)
ALBUMIN/GLOB SERPL: 1.8 G/DL
ALP SERPL-CCNC: 78 U/L (ref 39–117)
ALT SERPL W P-5'-P-CCNC: 17 U/L (ref 1–33)
ANION GAP SERPL CALCULATED.3IONS-SCNC: 7 MMOL/L (ref 5–15)
AST SERPL-CCNC: 21 U/L (ref 1–32)
BASOPHILS # BLD AUTO: 0.07 10*3/MM3 (ref 0–0.2)
BASOPHILS NFR BLD AUTO: 0.9 % (ref 0–1.5)
BILIRUB SERPL-MCNC: 1.1 MG/DL (ref 0–1.2)
BUN SERPL-MCNC: 9 MG/DL (ref 8–23)
BUN/CREAT SERPL: 7.1 (ref 7–25)
CALCIUM SPEC-SCNC: 9.7 MG/DL (ref 8.6–10.5)
CHLORIDE SERPL-SCNC: 105 MMOL/L (ref 98–107)
CHOLEST SERPL-MCNC: 166 MG/DL (ref 0–200)
CO2 SERPL-SCNC: 27 MMOL/L (ref 22–29)
CREAT SERPL-MCNC: 1.26 MG/DL (ref 0.57–1)
DEPRECATED RDW RBC AUTO: 43.5 FL (ref 37–54)
EGFRCR SERPLBLD CKD-EPI 2021: 48.4 ML/MIN/1.73
EOSINOPHIL # BLD AUTO: 0.23 10*3/MM3 (ref 0–0.4)
EOSINOPHIL NFR BLD AUTO: 3 % (ref 0.3–6.2)
ERYTHROCYTE [DISTWIDTH] IN BLOOD BY AUTOMATED COUNT: 14.4 % (ref 12.3–15.4)
GLOBULIN UR ELPH-MCNC: 2.4 GM/DL
GLUCOSE SERPL-MCNC: 97 MG/DL (ref 65–99)
HCT VFR BLD AUTO: 38.9 % (ref 34–46.6)
HDLC SERPL-MCNC: 61 MG/DL (ref 40–60)
HGB BLD-MCNC: 12.4 G/DL (ref 12–15.9)
IMM GRANULOCYTES # BLD AUTO: 0.01 10*3/MM3 (ref 0–0.05)
IMM GRANULOCYTES NFR BLD AUTO: 0.1 % (ref 0–0.5)
LDLC SERPL CALC-MCNC: 91 MG/DL (ref 0–100)
LDLC/HDLC SERPL: 1.47 {RATIO}
LYMPHOCYTES # BLD AUTO: 2.07 10*3/MM3 (ref 0.7–3.1)
LYMPHOCYTES NFR BLD AUTO: 27.4 % (ref 19.6–45.3)
MAGNESIUM SERPL-MCNC: 2.1 MG/DL (ref 1.6–2.4)
MCH RBC QN AUTO: 26.1 PG (ref 26.6–33)
MCHC RBC AUTO-ENTMCNC: 31.9 G/DL (ref 31.5–35.7)
MCV RBC AUTO: 81.9 FL (ref 79–97)
MONOCYTES # BLD AUTO: 0.7 10*3/MM3 (ref 0.1–0.9)
MONOCYTES NFR BLD AUTO: 9.3 % (ref 5–12)
NEUTROPHILS NFR BLD AUTO: 4.48 10*3/MM3 (ref 1.7–7)
NEUTROPHILS NFR BLD AUTO: 59.3 % (ref 42.7–76)
NRBC BLD AUTO-RTO: 0 /100 WBC (ref 0–0.2)
PLATELET # BLD AUTO: 172 10*3/MM3 (ref 140–450)
PMV BLD AUTO: 12.3 FL (ref 6–12)
POTASSIUM SERPL-SCNC: 4.3 MMOL/L (ref 3.5–5.2)
PROT SERPL-MCNC: 6.8 G/DL (ref 6–8.5)
RBC # BLD AUTO: 4.75 10*6/MM3 (ref 3.77–5.28)
SODIUM SERPL-SCNC: 139 MMOL/L (ref 136–145)
TRIGL SERPL-MCNC: 76 MG/DL (ref 0–150)
TSH SERPL DL<=0.05 MIU/L-ACNC: 1.9 UIU/ML (ref 0.27–4.2)
VLDLC SERPL-MCNC: 14 MG/DL (ref 5–40)
WBC NRBC COR # BLD: 7.56 10*3/MM3 (ref 3.4–10.8)

## 2022-12-30 PROCEDURE — 80061 LIPID PANEL: CPT | Performed by: INTERNAL MEDICINE

## 2022-12-30 PROCEDURE — 93000 ELECTROCARDIOGRAM COMPLETE: CPT | Performed by: INTERNAL MEDICINE

## 2022-12-30 PROCEDURE — 36415 COLL VENOUS BLD VENIPUNCTURE: CPT | Performed by: INTERNAL MEDICINE

## 2022-12-30 PROCEDURE — 80050 GENERAL HEALTH PANEL: CPT | Performed by: INTERNAL MEDICINE

## 2022-12-30 PROCEDURE — 99214 OFFICE O/P EST MOD 30 MIN: CPT | Performed by: INTERNAL MEDICINE

## 2022-12-30 PROCEDURE — 83735 ASSAY OF MAGNESIUM: CPT | Performed by: INTERNAL MEDICINE

## 2022-12-30 RX ORDER — HYDROCHLOROTHIAZIDE 25 MG/1
12.5 TABLET ORAL DAILY
Qty: 45 TABLET | Refills: 1 | Status: SHIPPED | OUTPATIENT
Start: 2022-12-30 | End: 2023-02-24 | Stop reason: SINTOL

## 2022-12-30 NOTE — PROGRESS NOTES
"[facility] at [facility]  Office visit  Bianka Fisher  1960  [phone]    VISIT DATE:  12/30/2022      PCP: David Mckeon DO  [id number] [facility] [id number]    CC:  Chief Complaint   Patient presents with   • Atherosclerosis of aorta (HCC)     Previous cardiac studies and procedures:  August 2017  ABIs: Normal  Stress echocardiogram  · Pt. Describes chest discomfort as \" my chest feels tired\" resolved in recovery  · Preserved functional capacity  · Left ventricular systolic function is normal.  · Normal stress echo with no significant echocardiographic evidence for myocardial ischemia.      August 2020 exercise myocardial perfusion imaging  · Mild calcifications visualized in the proximal LAD. Moderate aortic arch calcification visualized.  · Pt denied chest pain, pressure. Reported slight SOA.  · Expected exercise time: 6:55, actual time: 5:21  · No ECG evidence of myocardial ischemia  · Left ventricular ejection fraction is normal (Calculated EF = 68%).  · Myocardial perfusion imaging indicates a normal myocardial perfusion study with no evidence of ischemia.  · Impressions are consistent with a low risk study.      ASSESSMENT:   Diagnosis Plan   1. Atherosclerosis of aorta (HCC)        2. Essential hypertension  CBC & Differential    Comprehensive Metabolic Panel    Lipid Panel    TSH    Magnesium      3. Mixed hyperlipidemia            PLAN:  Hyperlipidemia: Continue pitavastatin 4 mg p.o. daily, goal LDL less than 100, intolerance to multiple other statins.  Continue Zetia 10 mg p.o. daily.    Hypertension: Continue propranolol(predominantly being used for essential tremor). Continue losartan 25 mg p.o. daily, goal blood pressure less than 130/80.  Adding hydrochlorothiazide 12.5 mg p.o. daily.    Atypical angina: Negative ischemia evaluation, cannot exclude episodes of vasospasm for more severe episodes.  Continue sublingual nitroglycerin to use as " "needed.    Coronary and aortic calcifications: Continue aggressive risk factor modification.  Not recommending antiplatelet therapy at this time.  Continue afterload reduction, titrating pharmacologic therapy for hyperlipidemia.  Encouraged regular exercise and dietary modifications for healthy diet.  Discussed developing diabetes.    Laboratory evaluation pending today.    Subjective  Blood pressures probably running less than 140/90 mmHg.. She is compliant with medical therapy.  Reviewed recent lipid profile.  Persistent intermittent mild chest discomfort.  Rare episodes of brief tachypalpitations.  Describing generalized edema.  She has been hesitant to use nitroglycerin on an as-needed basis.    PHYSICAL EXAMINATION:  Vitals:    12/30/22 0951   BP: 118/78   BP Location: Right arm   Patient Position: Sitting   Cuff Size: Adult   Pulse: 58   SpO2: 97%   Weight: 76.2 kg (168 lb)   Height: 157.5 cm (62\")     General Appearance:    Alert, cooperative, no distress, appears stated age   Head:    Normocephalic, without obvious abnormality, atraumatic   Eyes:    conjunctiva/corneas clear   Nose:   Nares normal, septum midline, mucosa normal, no drainage   Throat:   Lips, teeth and gums normal   Neck:   Supple, symmetrical, trachea midline, no carotid    bruit or JVD   Lungs:     Clear to auscultation bilaterally, respirations unlabored   Chest Wall:    No tenderness or deformity    Heart:    Regular rate and rhythm, S1 and S2 normal, no murmur, rub   or gallop, normal carotid impulse bilaterally without bruit.   Abdomen:     Soft, non-tender   Extremities:   Extremities normal, atraumatic, no cyanosis or edema   Pulses:   2+ and symmetric all extremities   Skin:   Skin color, texture, turgor normal, no rashes or lesions       Diagnostic Data:    ECG 12 Lead    Date/Time: 12/30/2022 10:25 AM  Performed by: Valentino Bates III, MD  Authorized by: Valentino Bates III, MD   Comparison: compared with previous ECG from " 8/2/2017  Similar to previous ECG  Rhythm: sinus bradycardia    Clinical impression: normal ECG          Lab Results   Component Value Date    TRIG 96 01/24/2022    HDL 49 01/24/2022     Lab Results   Component Value Date    GLUCOSE 97 04/26/2022    BUN 10 04/26/2022    CREATININE 1.10 (H) 04/26/2022     04/26/2022    K 4.8 04/26/2022     04/26/2022    CO2 24.7 04/26/2022     Lab Results   Component Value Date    HGBA1C 5.40 04/26/2022     Lab Results   Component Value Date    WBC 7.67 01/24/2022    HGB 13.4 01/24/2022    HCT 41.3 01/24/2022     01/24/2022       Allergies  Allergies   Allergen Reactions   • Crestor [Rosuvastatin Calcium] Hives       Current Medications    Current Outpatient Medications:   •  colestipol (COLESTID) 1 g tablet, Take 1 tablet by mouth 2 (Two) Times a Day., Disp: 180 tablet, Rfl: 3  •  ezetimibe (ZETIA) 10 MG tablet, Take 1 tablet by mouth Daily., Disp: 90 tablet, Rfl: 3  •  fluticasone (Flonase) 50 MCG/ACT nasal spray, 1 spray into the nostril(s) as directed by provider Daily., Disp: 16 g, Rfl: 11  •  Livalo 4 MG tablet, TAKE 1 TABLET BY MOUTH EVERYDAY AT BEDTIME, Disp: 90 tablet, Rfl: 3  •  losartan (COZAAR) 25 MG tablet, TAKE 1 TABLET BY MOUTH EVERY DAY, Disp: 90 tablet, Rfl: 3  •  propranolol LA (INDERAL LA) 60 MG 24 hr capsule, TAKE 1 CAPSULE BY MOUTH EVERY DAY, Disp: 90 capsule, Rfl: 3  •  traZODone (DESYREL) 100 MG tablet, Take 1 tablet by mouth every night at bedtime., Disp: 30 tablet, Rfl: 5  •  Vitamin D, Cholecalciferol, 50 MCG (2000 UT) capsule, Take 1 capsule by mouth Daily., Disp: 90 capsule, Rfl: 3  •  hydroCHLOROthiazide (HYDRODIURIL) 25 MG tablet, Take 0.5 tablets by mouth Daily., Disp: 45 tablet, Rfl: 1  •  nitroglycerin (NITROSTAT) 0.4 MG SL tablet, 1 under the tongue as needed for angina, may repeat q5mins for up three doses, Disp: 25 tablet, Rfl: 3          ROS  Review of Systems   Cardiovascular: Positive for chest pain and leg swelling. Negative  for dyspnea on exertion and palpitations.   Respiratory: Negative for cough, shortness of breath, snoring and wheezing.    Neurological: Positive for dizziness.   Allergic/Immunologic: Positive for environmental allergies.       SOCIAL HX  Social History     Socioeconomic History   • Marital status:    Tobacco Use   • Smoking status: Never   • Smokeless tobacco: Never   Vaping Use   • Vaping Use: Never used   Substance and Sexual Activity   • Alcohol use: No   • Drug use: No     Comment: quit when pt was in 20's   • Sexual activity: Defer     Partners: Male       FAMILY HX  Family History   Problem Relation Age of Onset   • Lung cancer Mother    • Cancer Mother    • Skin cancer Sister    • Alcohol abuse Sister    • Alcohol abuse Brother    • Dementia Maternal Aunt    • Alzheimer's disease Maternal Uncle    • Cancer Maternal Uncle    • Mental illness Maternal Uncle    • Alzheimer's disease Paternal Aunt    • Cancer Maternal Grandmother    • Heart disease Maternal Grandfather    • Breast cancer Neg Hx    • Endometrial cancer Neg Hx    • Ovarian cancer Neg Hx              Valentino Bates III, MD, FACC

## 2023-02-21 ENCOUNTER — TRANSCRIBE ORDERS (OUTPATIENT)
Dept: OBSTETRICS AND GYNECOLOGY | Facility: CLINIC | Age: 63
End: 2023-02-21
Payer: COMMERCIAL

## 2023-02-21 DIAGNOSIS — Z12.31 ENCOUNTER FOR SCREENING MAMMOGRAM FOR BREAST CANCER: Primary | ICD-10-CM

## 2023-02-24 ENCOUNTER — LAB (OUTPATIENT)
Dept: LAB | Facility: HOSPITAL | Age: 63
End: 2023-02-24
Payer: COMMERCIAL

## 2023-02-24 ENCOUNTER — OFFICE VISIT (OUTPATIENT)
Dept: FAMILY MEDICINE CLINIC | Facility: CLINIC | Age: 63
End: 2023-02-24
Payer: COMMERCIAL

## 2023-02-24 ENCOUNTER — HOSPITAL ENCOUNTER (OUTPATIENT)
Dept: GENERAL RADIOLOGY | Facility: HOSPITAL | Age: 63
Discharge: HOME | End: 2023-02-24
Payer: COMMERCIAL

## 2023-02-24 VITALS
TEMPERATURE: 98 F | WEIGHT: 165.6 LBS | DIASTOLIC BLOOD PRESSURE: 70 MMHG | SYSTOLIC BLOOD PRESSURE: 118 MMHG | BODY MASS INDEX: 30.29 KG/M2

## 2023-02-24 DIAGNOSIS — M54.50 ACUTE BILATERAL LOW BACK PAIN WITHOUT SCIATICA: ICD-10-CM

## 2023-02-24 DIAGNOSIS — M54.50 ACUTE BILATERAL LOW BACK PAIN WITHOUT SCIATICA: Primary | ICD-10-CM

## 2023-02-24 LAB
ANION GAP SERPL CALCULATED.3IONS-SCNC: 6 MMOL/L (ref 5–15)
BUN SERPL-MCNC: 11 MG/DL (ref 8–23)
BUN/CREAT SERPL: 10.1 (ref 7–25)
CALCIUM SPEC-SCNC: 9.5 MG/DL (ref 8.6–10.5)
CHLORIDE SERPL-SCNC: 105 MMOL/L (ref 98–107)
CO2 SERPL-SCNC: 28 MMOL/L (ref 22–29)
CREAT SERPL-MCNC: 1.09 MG/DL (ref 0.57–1)
EGFRCR SERPLBLD CKD-EPI 2021: 57.2 ML/MIN/1.73
GLUCOSE SERPL-MCNC: 87 MG/DL (ref 65–99)
POTASSIUM SERPL-SCNC: 4.3 MMOL/L (ref 3.5–5.2)
SODIUM SERPL-SCNC: 139 MMOL/L (ref 136–145)

## 2023-02-24 PROCEDURE — 80048 BASIC METABOLIC PNL TOTAL CA: CPT

## 2023-02-24 PROCEDURE — 99213 OFFICE O/P EST LOW 20 MIN: CPT | Performed by: FAMILY MEDICINE

## 2023-02-24 PROCEDURE — 72100 X-RAY EXAM L-S SPINE 2/3 VWS: CPT

## 2023-02-24 RX ORDER — METAXALONE 800 MG/1
800 TABLET ORAL 3 TIMES DAILY PRN
Qty: 42 TABLET | Refills: 0 | Status: SHIPPED | OUTPATIENT
Start: 2023-02-24 | End: 2023-03-10

## 2023-02-24 NOTE — PROGRESS NOTES
Established Patient Office Visit      Patient Name: Bianka Fisher  : 1960   MRN: [id number]   Care Team: Patient Care Team:  David Mckeon DO as PCP - General (Family Medicine)  Valentino Bates III, MD as Cardiologist (Cardiology)    Chief Complaint:    Chief Complaint   Patient presents with   • Back Pain     Pt co low back pain for approx 1 month       History of Present Illness: Bianka Fisher is a 63 y.o. female who is here today for chief complaint.    Back Pain  This is a new problem. The current episode started more than 1 month ago. The problem occurs constantly. The problem has been rapidly worsening since onset. The pain is present in the lumbar spine. The quality of the pain is described as aching. The pain is at a severity of 10/10. The pain is the same all the time. The symptoms are aggravated by bending, position, lying down, sitting, standing and twisting. Stiffness is present all day. Associated symptoms include abdominal pain, headaches, pelvic pain and weakness. Risk factors include lack of exercise, menopause, obesity, poor posture and sedentary lifestyle.     No known injury. Started about a month ago, feels internal radiating from back (L>R) around to abdomen. Every now and then she has taken someone's hydrocodone which helped for a very little while. Has not taken NSAIDs due to history of stomach problems with them. Has not taken APAP. No ice or heat therapy specifically but the heated seats in her car seem to help.    She stopped the HCTZ Dr. Bates started in December due to headaches. It was prescribed for edema in ankles which it did help with.    This patient is accompanied by their self who contributes to the history of their care.    The following portions of the patient's history were reviewed and updated as appropriate: allergies, current medications, past family history, past medical history, past social history, past surgical history and problem list.    Subjective       Review of Systems:   Review of Systems   Gastrointestinal: Positive for abdominal pain.   Genitourinary: Positive for pelvic pain.   Musculoskeletal: Positive for back pain.   Neurological: Positive for weakness.    - See HPI    Past Medical History:   Past Medical History:   Diagnosis Date   • Allergic 1960   • Anxiety    • Asthma    • Cholelithiasis     Gallbladder diseased/removed   • Colon polyp    • Coronary artery disease     See dr nikhil fang   • Diverticulosis    • GERD (gastroesophageal reflux disease)    • Heart murmur    • Hyperlipidemia    • Hypertension    • Inflammatory bowel disease     Since gallbladder removed   • Insomnia    • Migraine    • Peptic ulceration    • Peripheral neuropathy    • Skin cancer    • Tremor    • Tremors of nervous system    • Visual impairment        Past Surgical History:   Past Surgical History:   Procedure Laterality Date   • BREAST BIOPSY Right 2017   •  SECTION     • CHOLECYSTECTOMY     • COLONOSCOPY     • SKIN CANCER EXCISION      face   • TONSILLECTOMY     • TONSILLECTOMY         Family History:   Family History   Problem Relation Age of Onset   • Lung cancer Mother    • Cancer Mother         Lung cancer   • Skin cancer Sister    • Alcohol abuse Sister    • Alcohol abuse Brother    • COPD Brother    • Dementia Maternal Aunt    • Alzheimer's disease Maternal Uncle    • Cancer Maternal Uncle    • Mental illness Maternal Uncle    • Alzheimer's disease Paternal Aunt    • Cancer Maternal Grandmother    • Heart disease Maternal Grandfather    • Breast cancer Neg Hx    • Endometrial cancer Neg Hx    • Ovarian cancer Neg Hx        Social History:   Social History     Socioeconomic History   • Marital status:    Tobacco Use   • Smoking status: Never   • Smokeless tobacco: Never   Vaping Use   • Vaping Use: Never used   Substance and Sexual Activity   • Alcohol use: No   • Drug use: No     Comment: quit when pt was in 20's   • Sexual  activity: Defer     Partners: Male       Tobacco History:   Social History     Tobacco Use   Smoking Status Never   Smokeless Tobacco Never       Medications:     Current Outpatient Medications:   •  colestipol (COLESTID) 1 g tablet, Take 1 tablet by mouth 2 (Two) Times a Day., Disp: 180 tablet, Rfl: 3  •  ezetimibe (ZETIA) 10 MG tablet, Take 1 tablet by mouth Daily., Disp: 90 tablet, Rfl: 3  •  fluticasone (Flonase) 50 MCG/ACT nasal spray, 1 spray into the nostril(s) as directed by provider Daily., Disp: 16 g, Rfl: 11  •  Livalo 4 MG tablet, TAKE 1 TABLET BY MOUTH EVERYDAY AT BEDTIME, Disp: 90 tablet, Rfl: 3  •  losartan (COZAAR) 25 MG tablet, TAKE 1 TABLET BY MOUTH EVERY DAY, Disp: 90 tablet, Rfl: 3  •  nitroglycerin (NITROSTAT) 0.4 MG SL tablet, 1 under the tongue as needed for angina, may repeat q5mins for up three doses, Disp: 25 tablet, Rfl: 3  •  propranolol LA (INDERAL LA) 60 MG 24 hr capsule, TAKE 1 CAPSULE BY MOUTH EVERY DAY, Disp: 90 capsule, Rfl: 3  •  traZODone (DESYREL) 100 MG tablet, Take 1 tablet by mouth every night at bedtime., Disp: 30 tablet, Rfl: 5  •  Vitamin D, Cholecalciferol, 50 MCG (2000 UT) capsule, Take 1 capsule by mouth Daily., Disp: 90 capsule, Rfl: 3  •  metaxalone (Skelaxin) 800 MG tablet, Take 1 tablet by mouth 3 (Three) Times a Day As Needed for Muscle Spasms for up to 14 days., Disp: 42 tablet, Rfl: 0    Allergies:   Allergies   Allergen Reactions   • Crestor [Rosuvastatin Calcium] Hives       Objective   Objective     Physical Exam:  Vital Signs:   Vitals:    02/24/23 0953   BP: 118/70   BP Location: Left arm   Patient Position: Sitting   Cuff Size: Adult   Temp: 98 °F (36.7 °C)   TempSrc: Infrared   Weight: 75.1 kg (165 lb 9.6 oz)     Body mass index is 30.29 kg/m².     Physical Exam  Nursing note reviewed  Const: NAD, A&Ox4, Pleasant, Cooperative  Eyes: EOMI, no conjunctivitis  ENT: No nasal discharge present, neck supple  Cardiac: Regular rate and rhythm, no cyanosis  Resp:  Respiratory rate within normal limits, no increased work of breathing, no audible wheezing or retractions noted  GI: No distention or ascites  MSK: Tissue texture changes in the paraspinal muscles in the upper lumbar spine.  Reflexes brisk and symmetric in bilateral lower remedies, strength 5/5 globally in the lower extremities, sensation normal  Procedures/Radiology     Procedures  No radiology results for the last 7 days     Assessment & Plan   Assessment / Plan      Assessment/Plan:   Problems Addressed This Visit  Diagnoses and all orders for this visit:    1. Acute bilateral low back pain without sciatica (Primary)  -     Basic Metabolic Panel; Future  -     Urinalysis With Microscopic If Indicated (No Culture) - Urine, Clean Catch; Future  -     Ambulatory Referral to Physical Therapy Evaluate and treat  -     metaxalone (Skelaxin) 800 MG tablet; Take 1 tablet by mouth 3 (Three) Times a Day As Needed for Muscle Spasms for up to 14 days.  Dispense: 42 tablet; Refill: 0  -     XR Spine Lumbar 2 or 3 View; Future      Problem List Items Addressed This Visit    None  Visit Diagnoses     Acute bilateral low back pain without sciatica    -  Primary    Relevant Medications    metaxalone (Skelaxin) 800 MG tablet    Other Relevant Orders    Basic Metabolic Panel    Urinalysis With Microscopic If Indicated (No Culture) - Urine, Clean Catch    Ambulatory Referral to Physical Therapy Evaluate and treat    XR Spine Lumbar 2 or 3 View        Given the duration of her pain commended x-ray today.  Her kidney function has been slightly worsening over the last year so recommended rechecking today with a urine.  Pain seems cer  Referral to physical therapy placed today.  I will see her back in about 6 weeks.tainly muscular, discussed importance of posture and core strengthening.    There are no Patient Instructions on file for this visit.    Follow Up:   Return in about 7 weeks (around 4/14/2023) for follow up ortho  issue.    MDM     DO KRISTEN Casey RD  [facility]  [address]  [facility] [address]  Fax [phone]  Phone [phone]

## 2023-02-27 ENCOUNTER — PATIENT MESSAGE (OUTPATIENT)
Dept: FAMILY MEDICINE CLINIC | Facility: CLINIC | Age: 63
End: 2023-02-27
Payer: COMMERCIAL

## 2023-03-08 DIAGNOSIS — F51.01 PRIMARY INSOMNIA: ICD-10-CM

## 2023-03-08 RX ORDER — TRAZODONE HYDROCHLORIDE 100 MG/1
100 TABLET ORAL
Qty: 30 TABLET | Refills: 5 | Status: CANCELLED | OUTPATIENT
Start: 2023-03-08

## 2023-03-08 NOTE — TELEPHONE ENCOUNTER
Rx Refill Note  Requested Prescriptions      No prescriptions requested or ordered in this encounter      Last office visit with prescribing clinician: 2/24/2023   Last telemedicine visit with prescribing clinician: 4/18/2023   Next office visit with prescribing clinician: 4/18/2023                         Would you like a call back once the refill request has been completed: [] Yes [] No    If the office needs to give you a call back, can they leave a voicemail: [] Yes [] No    Peg Lyles MA  03/08/23, 15:32 EST

## 2023-03-14 ENCOUNTER — TREATMENT (OUTPATIENT)
Dept: PHYSICAL THERAPY | Facility: CLINIC | Age: 63
End: 2023-03-14
Payer: COMMERCIAL

## 2023-03-14 ENCOUNTER — OFFICE VISIT (OUTPATIENT)
Dept: OBSTETRICS AND GYNECOLOGY | Facility: CLINIC | Age: 63
End: 2023-03-14
Payer: COMMERCIAL

## 2023-03-14 VITALS
HEIGHT: 62 IN | BODY MASS INDEX: 31.65 KG/M2 | SYSTOLIC BLOOD PRESSURE: 128 MMHG | WEIGHT: 172 LBS | DIASTOLIC BLOOD PRESSURE: 78 MMHG

## 2023-03-14 DIAGNOSIS — Z01.419 WELL WOMAN EXAM WITH ROUTINE GYNECOLOGICAL EXAM: Primary | ICD-10-CM

## 2023-03-14 DIAGNOSIS — Z12.31 BREAST CANCER SCREENING BY MAMMOGRAM: ICD-10-CM

## 2023-03-14 DIAGNOSIS — N95.2 VAGINAL ATROPHY: ICD-10-CM

## 2023-03-14 DIAGNOSIS — M54.50 ACUTE BILATERAL LOW BACK PAIN WITHOUT SCIATICA: Primary | ICD-10-CM

## 2023-03-14 DIAGNOSIS — N95.1 MENOPAUSAL STATE: ICD-10-CM

## 2023-03-14 DIAGNOSIS — N94.10 DYSPAREUNIA, FEMALE: ICD-10-CM

## 2023-03-14 PROCEDURE — 99386 PREV VISIT NEW AGE 40-64: CPT | Performed by: NURSE PRACTITIONER

## 2023-03-14 PROCEDURE — 97530 THERAPEUTIC ACTIVITIES: CPT | Performed by: PHYSICAL THERAPIST

## 2023-03-14 PROCEDURE — 97110 THERAPEUTIC EXERCISES: CPT | Performed by: PHYSICAL THERAPIST

## 2023-03-14 PROCEDURE — 97161 PT EVAL LOW COMPLEX 20 MIN: CPT | Performed by: PHYSICAL THERAPIST

## 2023-03-14 RX ORDER — ESTRADIOL 10 UG/1
INSERT VAGINAL
Qty: 18 TABLET | Refills: 12 | Status: SHIPPED | OUTPATIENT
Start: 2023-03-14

## 2023-03-14 NOTE — PROGRESS NOTES
Gynecologic Annual Exam Note        GYN Annual Exam     CC - Here for annual exam.        HPI  Bianka Fisher is a 63 y.o. female, , who presents for annual well woman exam as a previous patient not seen in the last three years.  She is postmenopausal. Denies vaginal bleeding.  Partner Status: Marital Status: .  She is is not currently sexually active. STD testing recommendations have been explained to the patient and she does not desire STD testing.    She has not been sexually active since she was in her 50s due to vaginal dryness and painful intercourse.  She would like help with this.    Additional OB/GYN History   On HRT? No    Last Pap : unknown. Results: unknown . HPV: unknown .     History of abnormal Pap smear: no  Family history of uterine, colon, breast, or ovarian cancer: yes - pt maternal uncle and 2 first mat cousins had colon cancer  Performs monthly Self-Breast Exam: sometimes  Last mammogram: 2018. Done at [facility] .    Last colonoscopy: has had a colonoscopy 3 year(s) ago.    Last Completed Colonoscopy          COLORECTAL CANCER SCREENING (COLONOSCOPY - Every 10 Years) Next due on 2020  SCANNED - COLONOSCOPY                  Last bone density scan (DEXA): None  Exercises Regularly: no  Feelings of Anxiety or Depression: no      Tobacco Usage?: No       Current Outpatient Medications:   •  colestipol (COLESTID) 1 g tablet, Take 1 tablet by mouth 2 (Two) Times a Day., Disp: 180 tablet, Rfl: 3  •  ezetimibe (ZETIA) 10 MG tablet, Take 1 tablet by mouth Daily., Disp: 90 tablet, Rfl: 3  •  fluticasone (Flonase) 50 MCG/ACT nasal spray, 1 spray into the nostril(s) as directed by provider Daily., Disp: 16 g, Rfl: 11  •  Livalo 4 MG tablet, TAKE 1 TABLET BY MOUTH EVERYDAY AT BEDTIME, Disp: 90 tablet, Rfl: 3  •  losartan (COZAAR) 25 MG tablet, TAKE 1 TABLET BY MOUTH EVERY DAY, Disp: 90 tablet, Rfl: 3  •  propranolol LA (INDERAL LA) 60 MG 24 hr capsule,  TAKE 1 CAPSULE BY MOUTH EVERY DAY, Disp: 90 capsule, Rfl: 3  •  traZODone (DESYREL) 100 MG tablet, Take 1 tablet by mouth every night at bedtime., Disp: 30 tablet, Rfl: 5  •  Vitamin D, Cholecalciferol, 50 MCG (2000) capsule, Take 1 capsule by mouth Daily., Disp: 90 capsule, Rfl: 3  •  estradiol (Vagifem) 10 MCG tablet vaginal tablet, 1 per vagina qhs x 2 wks then twice weekly, Disp: 18 tablet, Rfl: 12    Patient denies the need for medication refills today.    OB History        2    Para   2    Term   2            AB        Living           SAB        IAB        Ectopic        Molar        Multiple        Live Births                    Past Medical History:   Diagnosis Date   • Allergic    • Anxiety    • Asthma    • Cholelithiasis     Gallbladder diseased/removed   • Colon polyp    • Coronary artery disease     See dr nikhil fang   • Diverticulosis    • GERD (gastroesophageal reflux disease)    • Heart murmur    • Hyperlipidemia    • Hypertension    • Inflammatory bowel disease     Since gallbladder removed   • Insomnia    • Migraine    • Peptic ulceration    • Peripheral neuropathy    • Skin cancer    • Tremor    • Tremors of nervous system    • Visual impairment         Past Surgical History:   Procedure Laterality Date   • BREAST BIOPSY Right 2017    benign   •  SECTION     • CHOLECYSTECTOMY     • COLONOSCOPY     • SKIN CANCER EXCISION      face   • TONSILLECTOMY     • TONSILLECTOMY         Health Maintenance   Topic Date Due   • Annual Gynecologic Pelvic and Breast Exam  Never done   • ZOSTER VACCINE (1 of 2) Never done   • HEPATITIS C SCREENING  Never done   • PAP SMEAR  Never done   • TDAP/TD VACCINES (2 - Td or Tdap) 2020   • MAMMOGRAM  2020   • INFLUENZA VACCINE  2022   • COVID-19 Vaccine (1) 2023 (Originally 1960)   • ANNUAL PHYSICAL  2023   • LIPID PANEL  2023   • COLORECTAL CANCER SCREENING  2030   • Pneumococcal  "Vaccine 0-64  Aged Out       The additional following portions of the patient's history were reviewed and updated as appropriate: allergies, current medications, past family history, past medical history, past social history, past surgical history and problem list.    Review of Systems    I have reviewed and agree with the HPI, ROS, and historical information as entered above. Fabienne Camposf, APRN    Objective   /78   Ht 157.5 cm (62\")   Wt 78 kg (172 lb)   LMP 02/01/2016   BMI 31.46 kg/m²     Physical Exam  Vitals and nursing note reviewed. Exam conducted with a chaperone present.   Constitutional:       General: She is not in acute distress.     Appearance: Normal appearance. She is well-developed. She is not ill-appearing.   HENT:      Head: Normocephalic and atraumatic.   Neck:      Thyroid: No thyroid mass or thyromegaly.   Pulmonary:      Effort: Pulmonary effort is normal. No respiratory distress or retractions.   Chest:      Chest wall: No mass.   Breasts:     Right: Normal. No mass, nipple discharge, skin change or tenderness.      Left: Normal. No mass, nipple discharge, skin change or tenderness.   Abdominal:      Palpations: Abdomen is soft. Abdomen is not rigid. There is no mass.      Tenderness: There is no abdominal tenderness. There is no guarding.      Hernia: No hernia is present. There is no hernia in the left inguinal area.   Genitourinary:     General: Normal vulva.      Labia:         Right: No rash, tenderness or lesion.         Left: No rash, tenderness or lesion.       Vagina: Normal. No vaginal discharge or lesions.      Cervix: Normal.      Uterus: Normal. Not enlarged, not fixed and not tender.       Adnexa: Right adnexa normal and left adnexa normal.        Right: No mass or tenderness.          Left: No mass or tenderness.        Rectum: No external hemorrhoid.      Comments: Vulvovaginal atrophy  Musculoskeletal:      Cervical back: Normal range of motion. No muscular " tenderness.   Skin:     General: Skin is warm and dry.   Neurological:      Mental Status: She is alert and oriented to person, place, and time.   Psychiatric:         Mood and Affect: Mood normal.         Behavior: Behavior normal.            Assessment and Plan    Problem List Items Addressed This Visit    None  Visit Diagnoses     Well woman exam with routine gynecological exam    -  Primary    Relevant Orders    LIQUID-BASED PAP SMEAR WITH HPV GENOTYPING REGARDLESS OF INTERPRETATION (JANE,COR,MAD)    Menopausal state        Relevant Orders    DEXA Bone Density Axial    Breast cancer screening by mammogram        Relevant Orders    Mammo Screening Digital Tomosynthesis Bilateral With CAD    Vaginal atrophy        Relevant Medications    estradiol (Vagifem) 10 MCG tablet vaginal tablet    Dyspareunia, female        Relevant Medications    estradiol (Vagifem) 10 MCG tablet vaginal tablet          1. GYN annual well woman exam.   2. Reviewed monthly self breast exams.  Instructed to call with lumps, pain, or breast discharge.  Yearly mammograms ordered.  3. Ordered mammogram today.  4. Recommended use of Vitamin D and getting adequate calcium in her diet. (1500mg)  5. Osteoporosis screening ordered today.  6. Reviewed exercise as a preventative health measures.   7. Colonoscopy recommended.  8. Recommended Flu Vaccine in Fall of each year.  9. RTC in 1 year or PRN with problems.  10. Return in about 1 year (around 3/14/2024) for Annual physical.   11. D/w pt try Vagifem.  Let me know if she has no improvement or new problems.    Fabienne Cesar, APRN  03/14/2023

## 2023-03-14 NOTE — PROGRESS NOTES
Physical Therapy Initial Evaluation and Plan of Care     [facility], [address]  [place] [address]    Patient: Bianka Fisher   : 1960  Diagnosis/ICD-10 Code:  Acute bilateral low back pain without sciatica [M54.50]  Referring practitioner: David Mckeon DO  Date of Initial Visit: 3/14/2023  Today's Date: 3/14/2023  Patient seen for 1 sessions           Subjective Questionnaire: Oswestry:       Subjective Evaluation    History of Present Illness  Mechanism of injury: Low back pain mostly L upper lumbar. Patient woke up around 6-7 weeks ago and her low back was hurting. She thinks her pain is related to new work chairs and bad posture, as she is short and her feet would dangle from the chair. She reports it hurts worse in the mornings and with any activity or position for too long. She feels like it was improving before the past weekend but worsened since playing with her grandchild. Her symptoms have started to radiate around her torso into her lower abdomen.  Heat and pain medication provides relief.     CLOF: sitting limited to 1 hr, walking limited to 20-30 min, standing limited to 20-30 min, sleep limited to 5-6 hrs/night due to pain    Medical history: hypertension, cardiac disease      Patient Occupation: social work superviser Pain  Current pain ratin  At best pain ratin  At worst pain ratin    Diagnostic Tests  Abnormal x-ray: mild multilevel degenerative changes.    Patient Goals  Patient goals for therapy: decreased pain  Patient goal: improve posture           Objective          Postural Observations    Additional Postural Observation Details  Standing: mild hyperlordosis and hip flexion    Palpation   Left   Hypertonic in the erector spinae, iliopsoas and quadratus lumborum.   Tenderness of the erector spinae, iliopsoas and quadratus lumborum.     Right   Hypertonic in the erector spinae, iliopsoas and quadratus lumborum. Tenderness of the iliopsoas and quadratus  lumborum.     Cervical Spine Comments  Left erector spinae: replicates main c/o of pain at TL junction.     Additional Palpation Details  Joint mobility: increased general guarding noted at lumbar and thoracic segments,     Active Range of Motion   Cervical/Thoracic Spine     Thoracic   Flexion: WFL  Extension: WFL  Left lateral flexion: WFL  Right lateral flexion: WFL  Left rotation: Active left thoracic rotation: 50%   Right rotation: Active right thoracic rotation: 75%     Additional Active Range of Motion Details  Flexion: touches toes, curve reversal present  Extension: 50% mostly upper lumbar, pain, lacks hip extension  R SB: WNL  L SB: WNL    Passive Range of Motion   Cervical/Thoracic Spine     Thoracic   Flexion: WFL  Extension: WFL  Left lateral flexion: WFL  Right lateral flexion: WFL  Left rotation: WFL  Right rotation: WFL  Left Hip   Flexion: WFL  Extension: 0 degrees   External rotation (90/90): WFL  Internal rotation (90/90): WFL    Right Hip   Flexion: WFL  Extension: 0 degrees   External rotation (90/90): WFL  Internal rotation (90/90): WFL    Strength/Myotome Testing     Left Hip   Planes of Motion   Flexion: 4+  Extension: 4-  Abduction: 4-  External rotation: 4+    Right Hip   Planes of Motion   Flexion: 4+  Extension: 4  Abduction: 4  External rotation: 4+    Muscle Activation     Additional Muscle Activation Details  Decreased L gluteal activation during prone hip extension          Assessment & Plan     Assessment  Impairments: abnormal coordination, abnormal muscle firing, abnormal or restricted ROM, activity intolerance, impaired physical strength, lacks appropriate home exercise program and pain with function  Functional Limitations: carrying objects, lifting, walking, uncomfortable because of pain, moving in bed, sitting and standing  Assessment details: Patient presents with L-sided thoracolumbar pain consistent with chronic paraspinal muscle strain following a change in her work chair  that reduced her LE support and impaired her posture. Increased local muscle guarding noted, with palpation of L erectors around TL junction replicating her main complaint of symptoms.     Barriers to therapy: work schedule  Prognosis: good    Goals  Plan Goals: Short Term Goals (4 weeks)  1. Patient to be compliant with initial HEP  2.  Patient to report walking/standing 45 minutes without increased pain.  3.  Patient to improve Modified Oswestry score to < 16/50.    Long Term Goals (8 weeks)  1. Patient to sit without limitation from back pain.  2. Patient to improve Modified Oswestry score to < 10/50.  3. Patient to demonstrate independence with home exercises.  4. Patient to walk without limitation from back pain.  5. Patient to no longer awaken from sleep due to back pain.    Plan  Therapy options: will be seen for skilled therapy services  Planned modality interventions: cryotherapy, TENS, thermotherapy (hydrocollator packs), dry needling and electrical stimulation/Russian stimulation  Planned therapy interventions: abdominal trunk stabilization, body mechanics training, functional ROM exercises, home exercise program, manual therapy, motor coordination training, neuromuscular re-education, soft tissue mobilization, strengthening, therapeutic activities, joint mobilization, ADL retraining, balance/weight-bearing training, spinal/joint mobilization, stretching, gait training and flexibility  Frequency: 2x week  Duration in weeks: 8  Treatment plan discussed with: patient  Plan details: 2x/week for 8 weeks        Timed:  Manual Therapy:    0     mins  01289;  Therapeutic Exercise:    10     mins  61127;     Neuromuscular Marci:    0    mins  29932;    Therapeutic Activity:     10     mins  98898;     Gait Trainin     mins  19792;     Ultrasound:     0     mins  99220;    Electrical Stimulation:    0     mins  71309 ( );    Untimed:  Electrical Stimulation:    0     mins  37721 (  );  Mechanical Traction:    0     mins  92420;     Timed Treatment:   20   mins   Total Treatment:     53   mins    PT SIGNATURE: Usman DOLLY Fox   License Number: [id number]  DATE TREATMENT INITIATED: 3/14/2023    Initial Certification  Certification Period: 6/12/2023  I certify that the therapy services are furnished while this patient is under my care.  The services outlined above are required by this patient, and will be reviewed every 90 days.     PHYSICIAN: David Mckeon, DO      DATE:     Please sign and return via fax to [phone].. Thank you, [facility].

## 2023-03-15 ENCOUNTER — TELEPHONE (OUTPATIENT)
Dept: OBSTETRICS AND GYNECOLOGY | Facility: CLINIC | Age: 63
End: 2023-03-15
Payer: COMMERCIAL

## 2023-03-15 NOTE — TELEPHONE ENCOUNTER
[facility] sent Fabienne Cesar a fax that Estradiol 10 MGC vaginal insert need PA for dosage. The pharmacist tried to rerun it several ways and it would not show a paid claim. To initiate an authorization request for this medication, please contact Chun MANZANO at [phone].The loading dose of #18 vag inserts is more than the 8 for 28 that is covered and it still needs a Prior Auth. I am waiting on the forms they are faxing to us.

## 2023-03-16 DIAGNOSIS — E55.9 VITAMIN D DEFICIENCY: ICD-10-CM

## 2023-03-16 LAB — REF LAB TEST METHOD: NORMAL

## 2023-03-16 NOTE — TELEPHONE ENCOUNTER
Rx Refill Note  Requested Prescriptions     Pending Prescriptions Disp Refills   • vitamin D (ERGOCALCIFEROL) 1.25 MG (42779 UT) capsule capsule [Pharmacy Med Name: VITAMIN D2 1.25MG(50,000 UNIT)] 4 capsule 2     Sig: TAKE 1 CAPSULE BY MOUTH 1 (ONE) TIME PER WEEK FOR 12 DOSES. CALL TO RECHECK VITAMIN D LEVEL WHEN FINISHED.      Last office visit with prescribing clinician: 2/24/2023   Last telemedicine visit with prescribing clinician: 4/18/2023   Next office visit with prescribing clinician: 4/18/2023                         Would you like a call back once the refill request has been completed: [] Yes [] No    If the office needs to give you a call back, can they leave a voicemail: [] Yes [] No    April CHAVA Chun  03/16/23, 16:49 EDT

## 2023-03-17 DIAGNOSIS — E55.9 VITAMIN D DEFICIENCY: ICD-10-CM

## 2023-03-17 RX ORDER — MULTIVIT-MIN/IRON/FOLIC ACID/K 18-600-40
2000 CAPSULE ORAL DAILY
Qty: 90 CAPSULE | Refills: 3 | Status: SHIPPED | OUTPATIENT
Start: 2023-03-17

## 2023-03-17 RX ORDER — ERGOCALCIFEROL 1.25 MG/1
50000 CAPSULE ORAL WEEKLY
Qty: 4 CAPSULE | Refills: 2 | Status: SHIPPED | OUTPATIENT
Start: 2023-03-17 | End: 2023-06-03

## 2023-03-17 NOTE — TELEPHONE ENCOUNTER
Rx Refill Note  Requested Prescriptions     Pending Prescriptions Disp Refills   • Vitamin D, Cholecalciferol, 50 MCG (2000 UT) capsule 90 capsule 3     Sig: Take 1 capsule by mouth Daily.      Last office visit with prescribing clinician: 2/24/2023   Last telemedicine visit with prescribing clinician: 4/18/2023   Next office visit with prescribing clinician: 4/18/2023                         Would you like a call back once the refill request has been completed: [] Yes [] No    If the office needs to give you a call back, can they leave a voicemail: [] Yes [] No    Inge Hernandez MA  03/17/23, 10:51 EDT

## 2023-03-17 NOTE — TELEPHONE ENCOUNTER
Should patient still be taking this?    Rx Refill Note  Requested Prescriptions     Pending Prescriptions Disp Refills   • vitamin D (ERGOCALCIFEROL) 1.25 MG (99146 UT) capsule capsule [Pharmacy Med Name: VITAMIN D2 1.25MG(50,000 UNIT)] 4 capsule 2     Sig: TAKE 1 CAPSULE BY MOUTH 1 (ONE) TIME PER WEEK FOR 12 DOSES. CALL TO RECHECK VITAMIN D LEVEL WHEN FINISHED.      Last office visit with prescribing clinician: 2/24/2023   Last telemedicine visit with prescribing clinician: 4/18/2023   Next office visit with prescribing clinician: 4/18/2023                         Would you like a call back once the refill request has been completed: [] Yes [] No    If the office needs to give you a call back, can they leave a voicemail: [] Yes [] No    Tyree Leo MA  03/17/23, 09:19 EDT

## 2023-03-20 ENCOUNTER — TELEPHONE (OUTPATIENT)
Dept: OBSTETRICS AND GYNECOLOGY | Facility: CLINIC | Age: 63
End: 2023-03-20
Payer: COMMERCIAL

## 2023-03-20 NOTE — TELEPHONE ENCOUNTER
The PA for Estradiol vaginal tablets was approved. Cost 33 dollars the pharmacy is waiting on the inventory to come in.Pt notified.

## 2023-03-21 ENCOUNTER — HOSPITAL ENCOUNTER (OUTPATIENT)
Dept: MAMMOGRAPHY | Facility: HOSPITAL | Age: 63
Discharge: HOME | End: 2023-03-21
Admitting: NURSE PRACTITIONER
Payer: COMMERCIAL

## 2023-03-21 ENCOUNTER — TREATMENT (OUTPATIENT)
Dept: PHYSICAL THERAPY | Facility: CLINIC | Age: 63
End: 2023-03-21
Payer: COMMERCIAL

## 2023-03-21 DIAGNOSIS — Z12.31 ENCOUNTER FOR SCREENING MAMMOGRAM FOR BREAST CANCER: ICD-10-CM

## 2023-03-21 DIAGNOSIS — M54.50 ACUTE BILATERAL LOW BACK PAIN WITHOUT SCIATICA: Primary | ICD-10-CM

## 2023-03-21 PROCEDURE — 77063 BREAST TOMOSYNTHESIS BI: CPT | Performed by: RADIOLOGY

## 2023-03-21 PROCEDURE — 77067 SCR MAMMO BI INCL CAD: CPT

## 2023-03-21 PROCEDURE — 97110 THERAPEUTIC EXERCISES: CPT | Performed by: PHYSICAL THERAPIST

## 2023-03-21 PROCEDURE — 77067 SCR MAMMO BI INCL CAD: CPT | Performed by: RADIOLOGY

## 2023-03-21 PROCEDURE — 77063 BREAST TOMOSYNTHESIS BI: CPT

## 2023-03-21 PROCEDURE — 97112 NEUROMUSCULAR REEDUCATION: CPT | Performed by: PHYSICAL THERAPIST

## 2023-03-21 NOTE — PROGRESS NOTES
Physical Therapy Daily Progress Note    [address] [facility], [address]  [place] [address]      Patient: Bianka Fisher   : 1960  Diagnosis/ICD-10 Code:  Acute bilateral low back pain without sciatica [M54.50]  Referring practitioner: David Mckeon DO  Date of Initial Visit: Type: THERAPY  Noted: 3/14/2023  Today's Date: 3/21/2023  Patient seen for 2 sessions           Patient reports: doing one of the exercises but forgot LTRs. She reports hurting pretty bad today.      Objective   See Exercise, Manual, and Modality Logs for complete treatment.       Assessment/Plan  Progressed trunk mobility while adding posterior chain strengthening with good tolerance. Patient demonstrated low energy throughout the session, but reported abolition of symptoms after treatment and reported gradual improvement throughout the visit.            Timed:  Manual Therapy:    0     mins  74749;  Therapeutic Exercise:    30     mins  39282;     Neuromuscular Marci:   10    mins  32641;    Therapeutic Activity:     0     mins  88970;     Gait Trainin     mins  34017;     Ultrasound:     0     mins  41398;    Electrical Stimulation:    0     mins  91318 ( );    Untimed:  Electrical Stimulation:    0     mins  43221 ( );  Mechanical Traction:    0     mins  10874;     Timed Treatment:   40   mins   Total Treatment:     40   mins    Usman Fox PT  Physical Therapist

## 2023-03-22 ENCOUNTER — TELEPHONE (OUTPATIENT)
Dept: PHYSICAL THERAPY | Facility: CLINIC | Age: 63
End: 2023-03-22

## 2023-03-24 RX ORDER — EZETIMIBE 10 MG/1
TABLET ORAL
Qty: 30 TABLET | Refills: 4 | Status: SHIPPED | OUTPATIENT
Start: 2023-03-24

## 2023-04-18 ENCOUNTER — LAB (OUTPATIENT)
Dept: LAB | Facility: HOSPITAL | Age: 63
End: 2023-04-18
Payer: COMMERCIAL

## 2023-04-18 ENCOUNTER — OFFICE VISIT (OUTPATIENT)
Dept: FAMILY MEDICINE CLINIC | Facility: CLINIC | Age: 63
End: 2023-04-18
Payer: COMMERCIAL

## 2023-04-18 VITALS
HEART RATE: 60 BPM | HEIGHT: 62 IN | BODY MASS INDEX: 30.95 KG/M2 | WEIGHT: 168.2 LBS | SYSTOLIC BLOOD PRESSURE: 130 MMHG | DIASTOLIC BLOOD PRESSURE: 90 MMHG | OXYGEN SATURATION: 98 % | TEMPERATURE: 97.8 F

## 2023-04-18 DIAGNOSIS — R41.3 MEMORY IMPAIRMENT: ICD-10-CM

## 2023-04-18 DIAGNOSIS — M54.50 ACUTE BILATERAL LOW BACK PAIN WITHOUT SCIATICA: Primary | ICD-10-CM

## 2023-04-18 PROCEDURE — 82746 ASSAY OF FOLIC ACID SERUM: CPT

## 2023-04-18 PROCEDURE — 99214 OFFICE O/P EST MOD 30 MIN: CPT | Performed by: FAMILY MEDICINE

## 2023-04-18 PROCEDURE — 82607 VITAMIN B-12: CPT
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
## 2023-04-18 NOTE — ASSESSMENT & PLAN NOTE
Multifactorial, impacted by medications, sleep, anxiety. Likely some vascular changes given history of known atherosclerosis in aorta. Update MRI (last 2018). Check B12, folate. Wean trazodone.

## 2023-04-19 LAB
FOLATE SERPL-MCNC: 12.9 NG/ML (ref 4.78–24.2)
VIT B12 BLD-MCNC: 275 PG/ML (ref 211–946)

## 2023-04-24 ENCOUNTER — PATIENT MESSAGE (OUTPATIENT)
Dept: FAMILY MEDICINE CLINIC | Facility: CLINIC | Age: 63
End: 2023-04-24
Payer: COMMERCIAL

## 2023-04-25 ENCOUNTER — TELEPHONE (OUTPATIENT)
Dept: FAMILY MEDICINE CLINIC | Facility: CLINIC | Age: 63
End: 2023-04-25
Payer: COMMERCIAL

## 2023-04-25 NOTE — TELEPHONE ENCOUNTER
Patient called and stated that she would like her Brain MRI without contrast to be sent to [facility] instead of [facility]. She has already scheduled an appointment with [facility], but will be cancelling it today due to the cost difference between locations. Please let the patient know when this has been done.

## 2023-04-27 ENCOUNTER — TELEPHONE (OUTPATIENT)
Dept: NEUROLOGY | Facility: CLINIC | Age: 63
End: 2023-04-27

## 2023-04-27 ENCOUNTER — OFFICE VISIT (OUTPATIENT)
Dept: NEUROLOGY | Facility: CLINIC | Age: 63
End: 2023-04-27
Payer: COMMERCIAL

## 2023-04-27 VITALS
HEART RATE: 66 BPM | OXYGEN SATURATION: 94 % | WEIGHT: 171 LBS | HEIGHT: 62 IN | DIASTOLIC BLOOD PRESSURE: 68 MMHG | SYSTOLIC BLOOD PRESSURE: 118 MMHG | BODY MASS INDEX: 31.47 KG/M2

## 2023-04-27 DIAGNOSIS — G25.0 TREMOR, ESSENTIAL: ICD-10-CM

## 2023-04-27 DIAGNOSIS — R41.3 MEMORY LOSS: Primary | ICD-10-CM

## 2023-04-27 DIAGNOSIS — R51.9 NONINTRACTABLE HEADACHE, UNSPECIFIED CHRONICITY PATTERN, UNSPECIFIED HEADACHE TYPE: ICD-10-CM

## 2023-04-27 PROCEDURE — 99214 OFFICE O/P EST MOD 30 MIN: CPT | Performed by: NURSE PRACTITIONER

## 2023-04-27 RX ORDER — AMITRIPTYLINE HYDROCHLORIDE 10 MG/1
10-20 TABLET, FILM COATED ORAL NIGHTLY
Qty: 60 TABLET | Refills: 3 | Status: SHIPPED | OUTPATIENT
Start: 2023-04-27
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
## 2023-04-28 ENCOUNTER — PATIENT MESSAGE (OUTPATIENT)
Dept: FAMILY MEDICINE CLINIC | Facility: CLINIC | Age: 63
End: 2023-04-28
Payer: COMMERCIAL

## 2023-05-03 ENCOUNTER — PATIENT MESSAGE (OUTPATIENT)
Dept: FAMILY MEDICINE CLINIC | Facility: CLINIC | Age: 63
End: 2023-05-03
Payer: COMMERCIAL

## 2023-05-05 ENCOUNTER — HOSPITAL ENCOUNTER (OUTPATIENT)
Dept: ULTRASOUND IMAGING | Facility: HOSPITAL | Age: 63
Discharge: HOME | End: 2023-05-05
Payer: COMMERCIAL

## 2023-05-05 ENCOUNTER — HOSPITAL ENCOUNTER (OUTPATIENT)
Dept: MAMMOGRAPHY | Facility: HOSPITAL | Age: 63
Discharge: HOME | End: 2023-05-05
Payer: COMMERCIAL

## 2023-05-05 DIAGNOSIS — R92.8 ABNORMAL MAMMOGRAM: ICD-10-CM

## 2023-05-05 PROCEDURE — 77065 DX MAMMO INCL CAD UNI: CPT

## 2023-05-05 PROCEDURE — 76642 ULTRASOUND BREAST LIMITED: CPT

## 2023-05-05 PROCEDURE — G0279 TOMOSYNTHESIS, MAMMO: HCPCS

## 2023-05-09 ENCOUNTER — TELEPHONE (OUTPATIENT)
Dept: NEUROLOGY | Facility: CLINIC | Age: 63
End: 2023-05-09
Payer: COMMERCIAL

## 2023-05-09 NOTE — TELEPHONE ENCOUNTER
Provider: HARRY HERNANDEZ APRN    Caller: MARISELA    Relationship to Patient: SELF    Phone Number: [phone]    Reason for Call: CALLING TO SEE IF PROVIDER CAN SEND A DOCTOR ORDER FOR PATIENT TO BE OFF WORK.  STATED SHE WOULD LIKE TO HAVE THE REST OF THE WEEK AND NEXT WEEK OFF.  STATED SHE IS NOT DOING WELL RIGHT NOW.  STATED SHE IS LOOKING INTO DOING FMLA.  STATED AT THE 4-27-23 PROVIDER AND PT TALKED ABOUT THIS.      When was the patient last seen: 4-27-23    PLEASE ADVISE

## 2023-05-18 ENCOUNTER — TELEPHONE (OUTPATIENT)
Dept: FAMILY MEDICINE CLINIC | Facility: CLINIC | Age: 63
End: 2023-05-18
Payer: COMMERCIAL

## 2023-05-18 NOTE — TELEPHONE ENCOUNTER
Patient called asking for an update about her Brain MRI referral. Right now we are waiting on authorization from her insurance before that can get sent over to the [facility]. I relayed that message to the patient.

## 2023-05-19 ENCOUNTER — TELEPHONE (OUTPATIENT)
Dept: NEUROLOGY | Facility: CLINIC | Age: 63
End: 2023-05-19
Payer: COMMERCIAL

## 2023-05-23 ENCOUNTER — OFFICE VISIT (OUTPATIENT)
Dept: PHYSICAL THERAPY | Facility: CLINIC | Age: 63
End: 2023-05-23
Payer: COMMERCIAL

## 2023-05-23 DIAGNOSIS — R41.841 COGNITIVE COMMUNICATION DEFICIT: Primary | ICD-10-CM

## 2023-05-23 PROCEDURE — 96125 COGNITIVE TEST BY HC PRO: CPT | Performed by: SPEECH-LANGUAGE PATHOLOGIST
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
## 2023-06-02 ENCOUNTER — PATIENT MESSAGE (OUTPATIENT)
Dept: NEUROLOGY | Facility: CLINIC | Age: 63
End: 2023-06-02

## 2023-06-02 NOTE — TELEPHONE ENCOUNTER
From: Bianka Fisher  To: Teresa Rodas  Sent: 6/2/2023 2:00 PM EDT  Subject: Medication Side Effects    I have buzzing noises in my ears that are very loud sound like the insect Cicadas. I read this is called Tinnitus. This has been going on for two weeks non stop. I read that my new medication you prescribed can cause this. Should I stop the medication because this in the ears is really loud and will not let up. Thanks

## 2023-06-02 NOTE — TELEPHONE ENCOUNTER
Please call Bianka and tell her to stop taking the amitriptyline and see if the tinnitus resolved.  Thanks, BJ

## 2023-07-26 ENCOUNTER — OFFICE VISIT (OUTPATIENT)
Dept: CARDIOLOGY | Facility: CLINIC | Age: 63
End: 2023-07-26
Payer: COMMERCIAL

## 2023-07-26 VITALS
BODY MASS INDEX: 31.1 KG/M2 | HEIGHT: 62 IN | HEART RATE: 90 BPM | OXYGEN SATURATION: 98 % | WEIGHT: 169 LBS | SYSTOLIC BLOOD PRESSURE: 122 MMHG | DIASTOLIC BLOOD PRESSURE: 76 MMHG

## 2023-07-26 DIAGNOSIS — I10 ESSENTIAL HYPERTENSION: ICD-10-CM

## 2023-07-26 DIAGNOSIS — E78.2 MIXED HYPERLIPIDEMIA: ICD-10-CM

## 2023-07-26 DIAGNOSIS — I70.0 ATHEROSCLEROSIS OF AORTA: Primary | ICD-10-CM

## 2023-07-26 PROCEDURE — 99214 OFFICE O/P EST MOD 30 MIN: CPT | Performed by: INTERNAL MEDICINE

## 2023-07-26 NOTE — PROGRESS NOTES
"[facility]  Office visit  Bianka Fisher  1960  [phone]    VISIT DATE:  7/26/2023      PCP: David Mckeon DO  [id number] [facility] [address]    CC:  Chief Complaint   Patient presents with    Hypertension    Hyperlipidemia    ASHD     Previous cardiac studies and procedures:  August 2017  ABIs: Normal  Stress echocardiogram  Pt. Describes chest discomfort as \" my chest feels tired\" resolved in recovery  Preserved functional capacity  Left ventricular systolic function is normal.  Normal stress echo with no significant echocardiographic evidence for myocardial ischemia.      August 2020 exercise myocardial perfusion imaging  Mild calcifications visualized in the proximal LAD. Moderate aortic arch calcification visualized.  Pt denied chest pain, pressure. Reported slight SOA.  Expected exercise time: 6:55, actual time: 5:21  No ECG evidence of myocardial ischemia  Left ventricular ejection fraction is normal (Calculated EF = 68%).  Myocardial perfusion imaging indicates a normal myocardial perfusion study with no evidence of ischemia.  Impressions are consistent with a low risk study.      ASSESSMENT:   Diagnosis Plan   1. Atherosclerosis of aorta        2. Essential hypertension        3. Mixed hyperlipidemia            PLAN:  Hyperlipidemia: Goal LDL less than 100, ideally less than 70.  Continue pitavastatin 4 mg p.o. daily, goal LDL less than 100, intolerance to multiple other statins.  Continue Zetia 10 mg p.o. daily.    Hypertension: Goal less than 130/80 mmHg.  Currently well controlled.  Continue current medical therapy.    Atypical angina: Negative ischemia evaluation, cannot exclude episodes of vasospasm for more severe episodes.  Currently asymptomatic.    Coronary and aortic calcifications: Continue aggressive risk factor modification.  Not recommending antiplatelet therapy at this time.  Continue afterload reduction, titrating pharmacologic " "therapy for hyperlipidemia.  Encouraged regular exercise and dietary modifications for healthy diet.  Discussed developing diabetes.      Subjective  Blood pressures running less than 140/90 mmHg.. She is compliant with medical therapy.  Reviewed recent lipid profile.  Describes some shortness of breath while she was trying to mow the yard in the heat humidity earlier this week, otherwise no change in baseline functional capacity.    PHYSICAL EXAMINATION:  Vitals:    07/26/23 1137   BP: 122/76   BP Location: Left arm   Patient Position: Sitting   Pulse: 90   SpO2: 98%   Weight: 76.7 kg (169 lb)   Height: 157.5 cm (62.01\")     General Appearance:    Alert, cooperative, no distress, appears stated age   Head:    Normocephalic, without obvious abnormality, atraumatic   Eyes:    conjunctiva/corneas clear   Nose:   Nares normal, septum midline, mucosa normal, no drainage   Throat:   Lips, teeth and gums normal   Neck:   Supple, symmetrical, trachea midline, no carotid    bruit or JVD   Lungs:     Clear to auscultation bilaterally, respirations unlabored   Chest Wall:    No tenderness or deformity    Heart:    Regular rate and rhythm, S1 and S2 normal, no murmur, rub   or gallop, normal carotid impulse bilaterally without bruit.   Abdomen:     Soft, non-tender   Extremities:   Extremities normal, atraumatic, no cyanosis or edema   Pulses:   2+ and symmetric all extremities   Skin:   Skin color, texture, turgor normal, no rashes or lesions       Diagnostic Data:  Procedures  Lab Results   Component Value Date    TRIG 76 12/30/2022    HDL 61 (H) 12/30/2022     Lab Results   Component Value Date    GLUCOSE 87 02/24/2023    BUN 11 02/24/2023    CREATININE 1.09 (H) 02/24/2023     02/24/2023    K 4.3 02/24/2023     02/24/2023    CO2 28.0 02/24/2023     Lab Results   Component Value Date    HGBA1C 5.40 04/26/2022     Lab Results   Component Value Date    WBC 7.56 12/30/2022    HGB 12.4 12/30/2022    HCT 38.9 " 12/30/2022     12/30/2022       Allergies  Allergies   Allergen Reactions    Crestor [Rosuvastatin Calcium] Hives       Current Medications    Current Outpatient Medications:     amitriptyline (ELAVIL) 10 MG tablet, Take 1-2 tablets by mouth Every Night., Disp: 60 tablet, Rfl: 3    ciprofloxacin-dexamethasone (CIPRODEX) 0.3-0.1 % otic suspension, Administer 4 drops to the right ear 2 (Two) Times a Day., Disp: 7.5 mL, Rfl: 0    colestipol (COLESTID) 1 g tablet, Take 1 tablet by mouth 2 (Two) Times a Day., Disp: 180 tablet, Rfl: 3    estradiol (Vagifem) 10 MCG tablet vaginal tablet, 1 per vagina qhs x 2 wks then twice weekly, Disp: 18 tablet, Rfl: 12    ezetimibe (ZETIA) 10 MG tablet, TAKE 1 TABLET BY MOUTH EVERY DAY, Disp: 30 tablet, Rfl: 4    fluticasone (Flonase) 50 MCG/ACT nasal spray, 1 spray into the nostril(s) as directed by provider Daily., Disp: 16 g, Rfl: 11    Livalo 4 MG tablet, TAKE 1 TABLET BY MOUTH EVERYDAY AT BEDTIME, Disp: 90 tablet, Rfl: 3    losartan (COZAAR) 25 MG tablet, TAKE 1 TABLET BY MOUTH EVERY DAY, Disp: 90 tablet, Rfl: 3    propranolol LA (INDERAL LA) 60 MG 24 hr capsule, TAKE 1 CAPSULE BY MOUTH EVERY DAY, Disp: 90 capsule, Rfl: 3    Vitamin D, Cholecalciferol, 50 MCG (2000 UT) capsule, Take 1 capsule by mouth Daily., Disp: 90 capsule, Rfl: 3          ROS  Review of Systems   Cardiovascular:  Positive for chest pain and leg swelling. Negative for dyspnea on exertion and palpitations.   Respiratory:  Negative for cough, shortness of breath, snoring and wheezing.    Neurological:  Positive for dizziness.   Allergic/Immunologic: Positive for environmental allergies.     SOCIAL HX  Social History     Socioeconomic History    Marital status:    Tobacco Use    Smoking status: Never    Smokeless tobacco: Never   Vaping Use    Vaping Use: Never used   Substance and Sexual Activity    Alcohol use: No    Drug use: No     Comment: quit when pt was in 20's    Sexual activity: Yes      Partners: Male     Birth control/protection: Vasectomy       FAMILY HX  Family History   Problem Relation Age of Onset    Lung cancer Mother     Cancer Mother         Lung cancer    Skin cancer Sister     Alcohol abuse Sister     Alcohol abuse Brother     COPD Brother     Dementia Maternal Aunt          with alzheimers    Alzheimer's disease Maternal Uncle     Cancer Maternal Uncle     Mental illness Maternal Uncle     Alzheimer's disease Paternal Aunt     Cancer Maternal Grandmother     Heart disease Maternal Grandfather     Breast cancer Neg Hx     Endometrial cancer Neg Hx     Ovarian cancer Neg Hx              Valentino Bates III, MD, FACC

## 2023-08-07 RX ORDER — EZETIMIBE 10 MG/1
TABLET ORAL
Qty: 30 TABLET | Refills: 4 | Status: SHIPPED | OUTPATIENT
Start: 2023-08-07

## 2023-08-07 RX ORDER — AMITRIPTYLINE HYDROCHLORIDE 10 MG/1
10-20 TABLET, FILM COATED ORAL NIGHTLY
Qty: 60 TABLET | Refills: 3 | Status: SHIPPED | OUTPATIENT
Start: 2023-08-07

## 2023-08-07 NOTE — TELEPHONE ENCOUNTER
Rx Refill Note  Requested Prescriptions     Pending Prescriptions Disp Refills    amitriptyline (ELAVIL) 10 MG tablet [Pharmacy Med Name: AMITRIPTYLINE HCL 10 MG TAB] 60 tablet 3     Sig: TAKE 1-2 TABLETS BY MOUTH EVERY NIGHT      Last filled: 04/27/2023, 60 with 3 Refills.   Last office visit with prescribing clinician: 4/27/2023      Next office visit with prescribing clinician: Visit date not found     Esmer Castaneda MA  08/07/23, 08:54 EDT

## 2023-08-08 ENCOUNTER — OFFICE VISIT (OUTPATIENT)
Dept: GASTROENTEROLOGY | Facility: CLINIC | Age: 63
End: 2023-08-08
Payer: COMMERCIAL

## 2023-08-08 VITALS
DIASTOLIC BLOOD PRESSURE: 78 MMHG | WEIGHT: 169.8 LBS | BODY MASS INDEX: 31.25 KG/M2 | HEIGHT: 62 IN | OXYGEN SATURATION: 97 % | HEART RATE: 68 BPM | TEMPERATURE: 97.3 F | SYSTOLIC BLOOD PRESSURE: 122 MMHG

## 2023-08-08 DIAGNOSIS — R10.9 ABDOMINAL PAIN, UNSPECIFIED ABDOMINAL LOCATION: Primary | ICD-10-CM

## 2023-08-08 DIAGNOSIS — R19.7 DIARRHEA, UNSPECIFIED TYPE: ICD-10-CM

## 2023-08-08 DIAGNOSIS — K90.89 BILE SALT-INDUCED DIARRHEA: ICD-10-CM

## 2023-08-08 DIAGNOSIS — K29.60 BILE SALT GASTRITIS: ICD-10-CM

## 2023-08-08 DIAGNOSIS — R10.9 CHRONIC ABDOMINAL PAIN: Primary | ICD-10-CM

## 2023-08-08 DIAGNOSIS — G89.29 CHRONIC ABDOMINAL PAIN: Primary | ICD-10-CM

## 2023-08-08 PROCEDURE — 99214 OFFICE O/P EST MOD 30 MIN: CPT | Performed by: INTERNAL MEDICINE

## 2023-08-08 RX ORDER — SUCRALFATE 1 G/1
1 TABLET ORAL
Qty: 120 TABLET | Refills: 0 | Status: SHIPPED | OUTPATIENT
Start: 2023-08-08 | End: 2023-09-07

## 2023-08-08 RX ORDER — MONTELUKAST SODIUM 4 MG/1
1 TABLET, CHEWABLE ORAL 2 TIMES DAILY
Qty: 60 TABLET | Refills: 12 | Status: SHIPPED | OUTPATIENT
Start: 2023-08-08 | End: 2023-09-07
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
## 2023-09-03 DIAGNOSIS — R10.9 ABDOMINAL PAIN, UNSPECIFIED ABDOMINAL LOCATION: ICD-10-CM

## 2023-09-06 RX ORDER — SUCRALFATE 1 G/1
1 TABLET ORAL
Qty: 120 TABLET | Refills: 0 | Status: SHIPPED | OUTPATIENT
Start: 2023-09-06 | End: 2023-10-06

## 2023-09-06 NOTE — TELEPHONE ENCOUNTER
Rx Refill Note  Requested Prescriptions     Pending Prescriptions Disp Refills    sucralfate (CARAFATE) 1 g tablet [Pharmacy Med Name: SUCRALFATE 1 GM TABLET] 120 tablet 0     Sig: TAKE 1 TABLET BY MOUTH 4 (FOUR) TIMES A DAY BEFORE MEALS & AT BEDTIME FOR 30 DAYS.      Last office visit with prescribing clinician: 8/8/2023   Last telemedicine visit with prescribing clinician: Visit date not found   Next office visit with prescribing clinician: Visit date not found                             EULA Baca  09/06/23, 08:54 EDT

## 2023-09-28 DIAGNOSIS — R10.9 ABDOMINAL PAIN, UNSPECIFIED ABDOMINAL LOCATION: ICD-10-CM

## 2023-09-28 RX ORDER — SUCRALFATE 1 G/1
1 TABLET ORAL
Qty: 120 TABLET | Refills: 0 | Status: SHIPPED | OUTPATIENT
Start: 2023-09-28 | End: 2023-10-28

## 2023-09-28 NOTE — TELEPHONE ENCOUNTER
Rx Refill Note  Requested Prescriptions     Pending Prescriptions Disp Refills    sucralfate (CARAFATE) 1 g tablet [Pharmacy Med Name: SUCRALFATE 1 GM TABLET] 120 tablet 0     Sig: TAKE 1 TABLET BY MOUTH 4 (FOUR) TIMES A DAY BEFORE MEALS & AT BEDTIME FOR 30 DAYS.      Last office visit with prescribing clinician: Visit date not found   Last telemedicine visit with prescribing clinician: Visit date not found   Next office visit with prescribing clinician: Visit date not found                           EULA Baca  09/28/23, 14:43 EDT

## 2023-10-07 DIAGNOSIS — I10 ESSENTIAL HYPERTENSION: ICD-10-CM

## 2023-10-09 RX ORDER — LOSARTAN POTASSIUM 25 MG/1
TABLET ORAL
Qty: 90 TABLET | Refills: 3 | Status: SHIPPED | OUTPATIENT
Start: 2023-10-09

## 2023-10-28 ENCOUNTER — LAB (OUTPATIENT)
Dept: LAB | Facility: HOSPITAL | Age: 63
End: 2023-10-28
Payer: COMMERCIAL

## 2023-10-28 ENCOUNTER — OFFICE VISIT (OUTPATIENT)
Dept: FAMILY MEDICINE CLINIC | Facility: CLINIC | Age: 63
End: 2023-10-28
Payer: COMMERCIAL

## 2023-10-28 VITALS
WEIGHT: 178 LBS | OXYGEN SATURATION: 98 % | BODY MASS INDEX: 32.76 KG/M2 | DIASTOLIC BLOOD PRESSURE: 80 MMHG | SYSTOLIC BLOOD PRESSURE: 114 MMHG | HEART RATE: 74 BPM | HEIGHT: 62 IN

## 2023-10-28 DIAGNOSIS — G25.0 TREMOR, ESSENTIAL: ICD-10-CM

## 2023-10-28 DIAGNOSIS — E66.09 CLASS 1 OBESITY DUE TO EXCESS CALORIES WITH SERIOUS COMORBIDITY AND BODY MASS INDEX (BMI) OF 32.0 TO 32.9 IN ADULT: ICD-10-CM

## 2023-10-28 DIAGNOSIS — E78.2 MIXED HYPERLIPIDEMIA: ICD-10-CM

## 2023-10-28 DIAGNOSIS — Z13.29 SCREENING FOR ENDOCRINE DISORDER: ICD-10-CM

## 2023-10-28 DIAGNOSIS — E53.8 B12 DEFICIENCY: ICD-10-CM

## 2023-10-28 DIAGNOSIS — I10 ESSENTIAL HYPERTENSION: ICD-10-CM

## 2023-10-28 DIAGNOSIS — R41.3 MEMORY IMPAIRMENT: ICD-10-CM

## 2023-10-28 DIAGNOSIS — Z00.00 WELL ADULT EXAM: ICD-10-CM

## 2023-10-28 DIAGNOSIS — E55.9 VITAMIN D DEFICIENCY: ICD-10-CM

## 2023-10-28 DIAGNOSIS — Z00.00 WELL ADULT EXAM: Primary | ICD-10-CM

## 2023-10-28 DIAGNOSIS — T50.905A MEDICATION SIDE EFFECT, INITIAL ENCOUNTER: ICD-10-CM

## 2023-10-28 LAB
25(OH)D3 SERPL-MCNC: 33.4 NG/ML (ref 30–100)
ALBUMIN SERPL-MCNC: 4.4 G/DL (ref 3.5–5.2)
ALBUMIN/GLOB SERPL: 1.7 G/DL
ALP SERPL-CCNC: 85 U/L (ref 39–117)
ALT SERPL W P-5'-P-CCNC: 18 U/L (ref 1–33)
ANION GAP SERPL CALCULATED.3IONS-SCNC: 8.3 MMOL/L (ref 5–15)
AST SERPL-CCNC: 24 U/L (ref 1–32)
BACTERIA UR QL AUTO: ABNORMAL /HPF
BILIRUB SERPL-MCNC: 0.9 MG/DL (ref 0–1.2)
BILIRUB UR QL STRIP: NEGATIVE
BUN SERPL-MCNC: 9 MG/DL (ref 8–23)
BUN/CREAT SERPL: 7.4 (ref 7–25)
CALCIUM SPEC-SCNC: 9.7 MG/DL (ref 8.6–10.5)
CHLORIDE SERPL-SCNC: 108 MMOL/L (ref 98–107)
CHOLEST SERPL-MCNC: 195 MG/DL (ref 0–200)
CLARITY UR: ABNORMAL
CO2 SERPL-SCNC: 24.7 MMOL/L (ref 22–29)
COLOR UR: YELLOW
CREAT SERPL-MCNC: 1.22 MG/DL (ref 0.57–1)
DEPRECATED RDW RBC AUTO: 42.9 FL (ref 37–54)
EGFRCR SERPLBLD CKD-EPI 2021: 50 ML/MIN/1.73
ERYTHROCYTE [DISTWIDTH] IN BLOOD BY AUTOMATED COUNT: 14.1 % (ref 12.3–15.4)
GLOBULIN UR ELPH-MCNC: 2.6 GM/DL
GLUCOSE SERPL-MCNC: 106 MG/DL (ref 65–99)
GLUCOSE UR STRIP-MCNC: NEGATIVE MG/DL
HBA1C MFR BLD: 6.1 % (ref 4.8–5.6)
HCT VFR BLD AUTO: 38.8 % (ref 34–46.6)
HCV AB SER DONR QL: NORMAL
HDLC SERPL-MCNC: 54 MG/DL (ref 40–60)
HGB BLD-MCNC: 12.3 G/DL (ref 12–15.9)
HGB UR QL STRIP.AUTO: NEGATIVE
HYALINE CASTS UR QL AUTO: ABNORMAL /LPF
KETONES UR QL STRIP: ABNORMAL
LDLC SERPL CALC-MCNC: 117 MG/DL (ref 0–100)
LDLC/HDLC SERPL: 2.1 {RATIO}
LEUKOCYTE ESTERASE UR QL STRIP.AUTO: ABNORMAL
MCH RBC QN AUTO: 26.3 PG (ref 26.6–33)
MCHC RBC AUTO-ENTMCNC: 31.7 G/DL (ref 31.5–35.7)
MCV RBC AUTO: 82.9 FL (ref 79–97)
NITRITE UR QL STRIP: NEGATIVE
PH UR STRIP.AUTO: 5.5 [PH] (ref 5–8)
PLATELET # BLD AUTO: 204 10*3/MM3 (ref 140–450)
PMV BLD AUTO: 11.7 FL (ref 6–12)
POTASSIUM SERPL-SCNC: 4.8 MMOL/L (ref 3.5–5.2)
PROT SERPL-MCNC: 7 G/DL (ref 6–8.5)
PROT UR QL STRIP: ABNORMAL
RBC # BLD AUTO: 4.68 10*6/MM3 (ref 3.77–5.28)
RBC # UR STRIP: ABNORMAL /HPF
REF LAB TEST METHOD: ABNORMAL
SODIUM SERPL-SCNC: 141 MMOL/L (ref 136–145)
SP GR UR STRIP: 1.02 (ref 1–1.03)
SQUAMOUS #/AREA URNS HPF: ABNORMAL /HPF
TRANS CELLS #/AREA URNS HPF: ABNORMAL /HPF
TRIGL SERPL-MCNC: 137 MG/DL (ref 0–150)
TSH SERPL DL<=0.05 MIU/L-ACNC: 3.38 UIU/ML (ref 0.27–4.2)
UROBILINOGEN UR QL STRIP: ABNORMAL
VIT B12 BLD-MCNC: 270 PG/ML (ref 211–946)
VLDLC SERPL-MCNC: 24 MG/DL (ref 5–40)
WBC # UR STRIP: ABNORMAL /HPF
WBC CLUMPS # UR AUTO: ABNORMAL /HPF
WBC NRBC COR # BLD: 9.86 10*3/MM3 (ref 3.4–10.8)

## 2023-10-28 PROCEDURE — 80050 GENERAL HEALTH PANEL: CPT

## 2023-10-28 PROCEDURE — 83036 HEMOGLOBIN GLYCOSYLATED A1C: CPT

## 2023-10-28 PROCEDURE — 81001 URINALYSIS AUTO W/SCOPE: CPT

## 2023-10-28 PROCEDURE — 82607 VITAMIN B-12: CPT

## 2023-10-28 PROCEDURE — 82306 VITAMIN D 25 HYDROXY: CPT

## 2023-10-28 PROCEDURE — 86803 HEPATITIS C AB TEST: CPT

## 2023-10-28 PROCEDURE — 80061 LIPID PANEL: CPT

## 2023-10-28 RX ORDER — LANOLIN ALCOHOL/MO/W.PET/CERES
1000 CREAM (GRAM) TOPICAL DAILY
Qty: 90 TABLET | Refills: 3 | Status: SHIPPED | OUTPATIENT
Start: 2023-10-28

## 2023-10-28 RX ORDER — TOPIRAMATE 25 MG/1
25 TABLET ORAL
Qty: 30 TABLET | Refills: 0 | Status: SHIPPED | OUTPATIENT
Start: 2023-10-28

## 2023-10-28 RX ORDER — AMITRIPTYLINE HYDROCHLORIDE 10 MG/1
10-20 TABLET, FILM COATED ORAL NIGHTLY
Qty: 60 TABLET | Refills: 3 | Status: SHIPPED | OUTPATIENT
Start: 2023-10-28
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
## 2023-10-28 NOTE — PATIENT INSTRUCTIONS
Try stopping amitriptyline at night for the next 2 weeks. It may be that your mental state improved with group home, and the amitriptyline isn't needed.  If you notice it worsen again, go back on amitriptyline at night.  If you do go back on amitriptyline, start topiramate in the afternoon to help with appetite (and tremor)

## 2023-10-28 NOTE — ASSESSMENT & PLAN NOTE
Continue pitava 4mg, Zetia 10mg. Goal LDL <70, acceptable <100. She has come off colestipol over the last few months, so I will be curious to see what her lipids look like. Fasting today for labs.

## 2023-11-04 DIAGNOSIS — E78.2 MIXED HYPERLIPIDEMIA: ICD-10-CM

## 2023-11-05 DIAGNOSIS — G25.0 TREMOR, ESSENTIAL: ICD-10-CM

## 2023-11-06 RX ORDER — PITAVASTATIN CALCIUM 4.18 MG/1
TABLET, FILM COATED ORAL
Qty: 30 TABLET | Refills: 11 | Status: SHIPPED | OUTPATIENT
Start: 2023-11-06

## 2023-11-06 RX ORDER — PROPRANOLOL HCL 60 MG
CAPSULE, EXTENDED RELEASE 24HR ORAL
Qty: 30 CAPSULE | Refills: 11 | Status: SHIPPED | OUTPATIENT
Start: 2023-11-06

## 2023-11-27 DIAGNOSIS — G25.0 TREMOR, ESSENTIAL: ICD-10-CM

## 2023-11-27 RX ORDER — PROPRANOLOL HCL 60 MG
60 CAPSULE, EXTENDED RELEASE 24HR ORAL DAILY
Qty: 30 CAPSULE | Refills: 3 | Status: SHIPPED | OUTPATIENT
Start: 2023-11-27

## 2023-11-30 DIAGNOSIS — G25.0 TREMOR, ESSENTIAL: ICD-10-CM

## 2023-11-30 DIAGNOSIS — T50.905A MEDICATION SIDE EFFECT, INITIAL ENCOUNTER: ICD-10-CM

## 2023-11-30 RX ORDER — TOPIRAMATE 25 MG/1
25 TABLET ORAL
Qty: 30 TABLET | Refills: 0 | Status: SHIPPED | OUTPATIENT
Start: 2023-11-30

## 2023-12-14 DIAGNOSIS — T50.905A MEDICATION SIDE EFFECT, INITIAL ENCOUNTER: ICD-10-CM

## 2023-12-14 DIAGNOSIS — G25.0 TREMOR, ESSENTIAL: ICD-10-CM

## 2023-12-14 RX ORDER — TOPIRAMATE 25 MG/1
25 TABLET ORAL
Qty: 90 TABLET | Refills: 0 | Status: SHIPPED | OUTPATIENT
Start: 2023-12-14

## 2024-01-11 DIAGNOSIS — R41.3 MEMORY IMPAIRMENT: ICD-10-CM

## 2024-01-11 RX ORDER — AMITRIPTYLINE HYDROCHLORIDE 10 MG/1
10-20 TABLET, FILM COATED ORAL NIGHTLY
Qty: 180 TABLET | Refills: 1 | Status: SHIPPED | OUTPATIENT
Start: 2024-01-11

## 2024-03-06 RX ORDER — EZETIMIBE 10 MG/1
TABLET ORAL
Qty: 90 TABLET | Refills: 0 | Status: SHIPPED | OUTPATIENT
Start: 2024-03-06

## 2024-03-25 ENCOUNTER — OFFICE VISIT (OUTPATIENT)
Dept: CARDIOLOGY | Facility: CLINIC | Age: 64
End: 2024-03-25
Payer: COMMERCIAL

## 2024-03-25 VITALS
HEART RATE: 72 BPM | DIASTOLIC BLOOD PRESSURE: 64 MMHG | HEIGHT: 62 IN | SYSTOLIC BLOOD PRESSURE: 122 MMHG | WEIGHT: 180.6 LBS | BODY MASS INDEX: 33.23 KG/M2 | OXYGEN SATURATION: 98 %

## 2024-03-25 DIAGNOSIS — E78.2 MIXED HYPERLIPIDEMIA: ICD-10-CM

## 2024-03-25 DIAGNOSIS — I10 ESSENTIAL HYPERTENSION: Primary | ICD-10-CM

## 2024-03-25 PROCEDURE — 99214 OFFICE O/P EST MOD 30 MIN: CPT | Performed by: INTERNAL MEDICINE

## 2024-03-25 PROCEDURE — G2211 COMPLEX E/M VISIT ADD ON: HCPCS | Performed by: INTERNAL MEDICINE

## 2024-03-25 RX ORDER — PITAVASTATIN CALCIUM 4.18 MG/1
1 TABLET, FILM COATED ORAL
Qty: 30 TABLET | Refills: 11 | Status: SHIPPED | OUTPATIENT
Start: 2024-03-25

## 2024-03-25 RX ORDER — MONTELUKAST SODIUM 4 MG/1
1 TABLET, CHEWABLE ORAL 2 TIMES DAILY
COMMUNITY

## 2024-03-25 NOTE — PROGRESS NOTES
"[facility] at [facility]  Office visit  Bianka Fisher  1960  [phone]    VISIT DATE:  3/25/2024      PCP: David Mckeon DO  [id number] [facility] [address]    CC:  Chief Complaint   Patient presents with    Atherosclerosis of aorta    Edema     Previous cardiac studies and procedures:  August 2017  ABIs: Normal  Stress echocardiogram  Pt. Describes chest discomfort as \" my chest feels tired\" resolved in recovery  Preserved functional capacity  Left ventricular systolic function is normal.  Normal stress echo with no significant echocardiographic evidence for myocardial ischemia.      August 2020 exercise myocardial perfusion imaging  Mild calcifications visualized in the proximal LAD. Moderate aortic arch calcification visualized.  Pt denied chest pain, pressure. Reported slight SOA.  Expected exercise time: 6:55, actual time: 5:21  No ECG evidence of myocardial ischemia  Left ventricular ejection fraction is normal (Calculated EF = 68%).  Myocardial perfusion imaging indicates a normal myocardial perfusion study with no evidence of ischemia.  Impressions are consistent with a low risk study.      ASSESSMENT:   Diagnosis Plan   1. Essential hypertension        2. Mixed hyperlipidemia            PLAN:  Hyperlipidemia: Goal LDL less than 100, ideally less than 70.  Restarting pitavastatin 4 mg p.o. daily, goal LDL less than 100, intolerance to multiple other statins.  Continue Zetia 10 mg p.o. daily.  Will consider switching to either PCSK9 inhibitor or Leqvio if Livalo becomes cost prohibitive.    Hypertension: Goal less than 130/80 mmHg.  Currently well controlled.  Continue current medical therapy.    Atypical angina: Negative ischemia evaluation, cannot exclude episodes of vasospasm for more severe episodes.  Currently asymptomatic.    Coronary and aortic calcifications: Continue aggressive risk factor modification.  Not recommending antiplatelet therapy at this time.  " "Continue afterload reduction, titrating pharmacologic therapy for hyperlipidemia.  Encouraged regular exercise and dietary modifications for healthy diet.       Subjective  Prediabetic.  Blood pressures running less than 140/90 mmHg.. She is compliant with medical therapy.  Reviewed recent lipid profile.  Has not been on Livalo for approximately 3 months due to increase in price.  Currently only walking about 2 times per month.  Please scanned about 10 pounds since her last visit.    PHYSICAL EXAMINATION:  Vitals:    03/25/24 1340   BP: 122/64   BP Location: Right arm   Patient Position: Sitting   Pulse: 72   SpO2: 98%   Weight: 81.9 kg (180 lb 9.6 oz)   Height: 157.5 cm (62\")       General Appearance:    Alert, cooperative, no distress, appears stated age   Head:    Normocephalic, without obvious abnormality, atraumatic   Eyes:    conjunctiva/corneas clear   Nose:   Nares normal, septum midline, mucosa normal, no drainage   Throat:   Lips, teeth and gums normal   Neck:   Supple, symmetrical, trachea midline, no carotid    bruit or JVD   Lungs:     Clear to auscultation bilaterally, respirations unlabored   Chest Wall:    No tenderness or deformity    Heart:    Regular rate and rhythm, S1 and S2 normal, no murmur, rub   or gallop, normal carotid impulse bilaterally without bruit.   Abdomen:     Soft, non-tender   Extremities:   Extremities normal, atraumatic, no cyanosis or edema   Pulses:   2+ and symmetric all extremities   Skin:   Skin color, texture, turgor normal, no rashes or lesions       Diagnostic Data:  Procedures  Lab Results   Component Value Date    TRIG 137 10/28/2023    HDL 54 10/28/2023     Lab Results   Component Value Date    GLUCOSE 106 (H) 10/28/2023    BUN 9 10/28/2023    CREATININE 1.22 (H) 10/28/2023     10/28/2023    K 4.8 10/28/2023     (H) 10/28/2023    CO2 24.7 10/28/2023     Lab Results   Component Value Date    HGBA1C 6.10 (H) 10/28/2023     Lab Results   Component Value Date "    WBC 9.86 10/28/2023    HGB 12.3 10/28/2023    HCT 38.8 10/28/2023     10/28/2023       Allergies  Allergies   Allergen Reactions    Crestor [Rosuvastatin Calcium] Hives       Current Medications    Current Outpatient Medications:     amitriptyline (ELAVIL) 10 MG tablet, Take 1-2 tablets by mouth Every Night., Disp: 180 tablet, Rfl: 1    colestipol (COLESTID) 1 g tablet, Take 1 tablet by mouth 2 (Two) Times a Day., Disp: , Rfl:     estradiol (Vagifem) 10 MCG tablet vaginal tablet, 1 per vagina qhs x 2 wks then twice weekly, Disp: 18 tablet, Rfl: 12    ezetimibe (ZETIA) 10 MG tablet, TAKE 1 TABLET BY MOUTH EVERY DAY, Disp: 90 tablet, Rfl: 0    losartan (COZAAR) 25 MG tablet, TAKE 1 TABLET BY MOUTH EVERY DAY, Disp: 90 tablet, Rfl: 3    propranolol LA (INDERAL LA) 60 MG 24 hr capsule, Take 1 capsule by mouth Daily., Disp: 30 capsule, Rfl: 3    vitamin B-12 (CYANOCOBALAMIN) 1000 MCG tablet, Take 1 tablet by mouth Daily., Disp: 90 tablet, Rfl: 3    Vitamin D, Cholecalciferol, 50 MCG (2000 UT) capsule, Take 1 capsule by mouth Daily., Disp: 90 capsule, Rfl: 3    Livalo 4 MG tablet, TAKE 1 TABLET BY MOUTH EVERYDAY AT BEDTIME (Patient not taking: Reported on 3/25/2024), Disp: 30 tablet, Rfl: 11    topiramate (Topamax) 25 MG tablet, Take 1 tablet by mouth Every Afternoon. (Patient not taking: Reported on 3/25/2024), Disp: 90 tablet, Rfl: 0          ROS  Review of Systems   Cardiovascular:  Positive for chest pain and leg swelling. Negative for dyspnea on exertion and palpitations.   Respiratory:  Negative for cough, shortness of breath, snoring and wheezing.    Neurological:  Positive for dizziness.   Allergic/Immunologic: Positive for environmental allergies.       SOCIAL HX  Social History     Socioeconomic History    Marital status:    Tobacco Use    Smoking status: Never     Passive exposure: Past    Smokeless tobacco: Never   Vaping Use    Vaping status: Never Used   Substance and Sexual Activity     Alcohol use: No    Drug use: No     Comment: quit when pt was in 20's    Sexual activity: Yes     Partners: Male     Birth control/protection: Vasectomy       FAMILY HX  Family History   Problem Relation Age of Onset    Lung cancer Mother     Cancer Mother         Lung cancer    Skin cancer Sister     Alcohol abuse Sister     Alcohol abuse Brother     COPD Brother     Dementia Maternal Aunt          with alzheimers    Alzheimer's disease Maternal Uncle     Cancer Maternal Uncle     Mental illness Maternal Uncle     Alzheimer's disease Paternal Aunt     Cancer Maternal Grandmother     Heart disease Maternal Grandfather     Breast cancer Neg Hx     Endometrial cancer Neg Hx     Ovarian cancer Neg Hx              Valentino Bates III, MD, FACC

## 2024-04-08 DIAGNOSIS — E55.9 VITAMIN D DEFICIENCY: ICD-10-CM

## 2024-04-08 DIAGNOSIS — N95.2 VAGINAL ATROPHY: ICD-10-CM

## 2024-04-08 DIAGNOSIS — N94.10 DYSPAREUNIA, FEMALE: ICD-10-CM

## 2024-04-08 RX ORDER — ESTRADIOL 10 UG/1
INSERT VAGINAL
Qty: 18 TABLET | Refills: 0 | Status: SHIPPED | OUTPATIENT
Start: 2024-04-08 | End: 2024-04-09

## 2024-04-08 RX ORDER — ACETAMINOPHEN 160 MG
2000 TABLET,DISINTEGRATING ORAL DAILY
Qty: 90 CAPSULE | Refills: 3 | Status: SHIPPED | OUTPATIENT
Start: 2024-04-08

## 2024-04-09 ENCOUNTER — TELEPHONE (OUTPATIENT)
Dept: OBSTETRICS AND GYNECOLOGY | Facility: CLINIC | Age: 64
End: 2024-04-09
Payer: COMMERCIAL

## 2024-04-09 DIAGNOSIS — N95.2 VAGINAL ATROPHY: ICD-10-CM

## 2024-04-09 DIAGNOSIS — N94.10 DYSPAREUNIA, FEMALE: ICD-10-CM

## 2024-04-09 RX ORDER — ESTRADIOL 10 UG/1
INSERT VAGINAL
Qty: 8 TABLET | Refills: 2 | Status: SHIPPED | OUTPATIENT
Start: 2024-04-09

## 2024-04-09 NOTE — TELEPHONE ENCOUNTER
[facility] is closed for lunch. They sent a fax yesterday that a PA was needed for QTY and directions of Vagifem. Max of 8 per 28 days per ins. This was a refill of Vagifem not a new start. New refill sent to [facility]

## 2024-04-20 DIAGNOSIS — E53.8 B12 DEFICIENCY: ICD-10-CM

## 2024-04-22 RX ORDER — LANOLIN ALCOHOL/MO/W.PET/CERES
1000 CREAM (GRAM) TOPICAL DAILY
Qty: 90 TABLET | Refills: 3 | Status: SHIPPED | OUTPATIENT
Start: 2024-04-22

## 2024-05-09 ENCOUNTER — TELEPHONE (OUTPATIENT)
Dept: CARDIOLOGY | Facility: CLINIC | Age: 64
End: 2024-05-09
Payer: COMMERCIAL

## 2024-06-05 RX ORDER — EZETIMIBE 10 MG/1
TABLET ORAL
Qty: 30 TABLET | Refills: 2 | Status: SHIPPED | OUTPATIENT
Start: 2024-06-05

## 2024-09-03 RX ORDER — EZETIMIBE 10 MG/1
TABLET ORAL
Qty: 30 TABLET | Refills: 1 | Status: SHIPPED | OUTPATIENT
Start: 2024-09-03

## 2024-09-09 DIAGNOSIS — I10 ESSENTIAL HYPERTENSION: ICD-10-CM

## 2024-09-09 RX ORDER — LOSARTAN POTASSIUM 25 MG/1
TABLET ORAL
Qty: 90 TABLET | Refills: 0 | Status: SHIPPED | OUTPATIENT
Start: 2024-09-09

## 2024-10-03 ENCOUNTER — OFFICE VISIT (OUTPATIENT)
Dept: FAMILY MEDICINE CLINIC | Facility: CLINIC | Age: 64
End: 2024-10-03
Payer: COMMERCIAL

## 2024-10-03 ENCOUNTER — LAB (OUTPATIENT)
Dept: LAB | Facility: HOSPITAL | Age: 64
End: 2024-10-03
Payer: COMMERCIAL

## 2024-10-03 VITALS
BODY MASS INDEX: 31.83 KG/M2 | HEART RATE: 66 BPM | WEIGHT: 173 LBS | OXYGEN SATURATION: 97 % | SYSTOLIC BLOOD PRESSURE: 128 MMHG | HEIGHT: 62 IN | DIASTOLIC BLOOD PRESSURE: 84 MMHG | RESPIRATION RATE: 20 BRPM

## 2024-10-03 DIAGNOSIS — M79.605 LEFT LEG PAIN: ICD-10-CM

## 2024-10-03 DIAGNOSIS — Z13.29 SCREENING FOR ENDOCRINE DISORDER: ICD-10-CM

## 2024-10-03 DIAGNOSIS — M77.8 RIGHT ELBOW TENDINITIS: Primary | ICD-10-CM

## 2024-10-03 DIAGNOSIS — Z13.89 SCREENING FOR BLOOD OR PROTEIN IN URINE: ICD-10-CM

## 2024-10-03 DIAGNOSIS — F51.01 PRIMARY INSOMNIA: ICD-10-CM

## 2024-10-03 DIAGNOSIS — Z13.0 SCREENING FOR DEFICIENCY ANEMIA: ICD-10-CM

## 2024-10-03 DIAGNOSIS — Z13.220 SCREENING FOR HYPERLIPIDEMIA: ICD-10-CM

## 2024-10-03 DIAGNOSIS — H93.13 TINNITUS OF BOTH EARS: ICD-10-CM

## 2024-10-03 LAB
BASOPHILS # BLD AUTO: 0.08 10*3/MM3 (ref 0–0.2)
BASOPHILS NFR BLD AUTO: 0.8 % (ref 0–1.5)
D DIMER PPP FEU-MCNC: <0.27 MCGFEU/ML (ref 0–0.64)
DEPRECATED RDW RBC AUTO: 39.6 FL (ref 37–54)
EOSINOPHIL # BLD AUTO: 0.23 10*3/MM3 (ref 0–0.4)
EOSINOPHIL NFR BLD AUTO: 2.4 % (ref 0.3–6.2)
ERYTHROCYTE [DISTWIDTH] IN BLOOD BY AUTOMATED COUNT: 12.9 % (ref 12.3–15.4)
HCT VFR BLD AUTO: 41.5 % (ref 34–46.6)
HGB BLD-MCNC: 14 G/DL (ref 12–15.9)
IMM GRANULOCYTES # BLD AUTO: 0.04 10*3/MM3 (ref 0–0.05)
IMM GRANULOCYTES NFR BLD AUTO: 0.4 % (ref 0–0.5)
LYMPHOCYTES # BLD AUTO: 2.9 10*3/MM3 (ref 0.7–3.1)
LYMPHOCYTES NFR BLD AUTO: 30.3 % (ref 19.6–45.3)
MCH RBC QN AUTO: 29.2 PG (ref 26.6–33)
MCHC RBC AUTO-ENTMCNC: 33.7 G/DL (ref 31.5–35.7)
MCV RBC AUTO: 86.5 FL (ref 79–97)
MONOCYTES # BLD AUTO: 0.85 10*3/MM3 (ref 0.1–0.9)
MONOCYTES NFR BLD AUTO: 8.9 % (ref 5–12)
NEUTROPHILS NFR BLD AUTO: 5.47 10*3/MM3 (ref 1.7–7)
NEUTROPHILS NFR BLD AUTO: 57.2 % (ref 42.7–76)
NRBC BLD AUTO-RTO: 0 /100 WBC (ref 0–0.2)
PLATELET # BLD AUTO: 214 10*3/MM3 (ref 140–450)
PMV BLD AUTO: 11.9 FL (ref 6–12)
RBC # BLD AUTO: 4.8 10*6/MM3 (ref 3.77–5.28)
WBC NRBC COR # BLD AUTO: 9.57 10*3/MM3 (ref 3.4–10.8)

## 2024-10-03 PROCEDURE — 80061 LIPID PANEL: CPT | Performed by: FAMILY MEDICINE

## 2024-10-03 PROCEDURE — 81001 URINALYSIS AUTO W/SCOPE: CPT | Performed by: FAMILY MEDICINE

## 2024-10-03 PROCEDURE — 80050 GENERAL HEALTH PANEL: CPT | Performed by: FAMILY MEDICINE

## 2024-10-03 PROCEDURE — 85379 FIBRIN DEGRADATION QUANT: CPT | Performed by: FAMILY MEDICINE

## 2024-10-03 PROCEDURE — 99214 OFFICE O/P EST MOD 30 MIN: CPT | Performed by: FAMILY MEDICINE

## 2024-10-03 PROCEDURE — 83036 HEMOGLOBIN GLYCOSYLATED A1C: CPT | Performed by: FAMILY MEDICINE

## 2024-10-03 NOTE — PATIENT INSTRUCTIONS
Ultrasound of your left leg will be ordered, you will get a call to schedule  White noise machine plug-in on [employer]
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
## 2024-10-04 ENCOUNTER — TELEPHONE (OUTPATIENT)
Dept: FAMILY MEDICINE CLINIC | Facility: CLINIC | Age: 64
End: 2024-10-04

## 2024-10-04 LAB
ALBUMIN SERPL-MCNC: 4.3 G/DL (ref 3.5–5.2)
ALBUMIN/GLOB SERPL: 1.7 G/DL
ALP SERPL-CCNC: 89 U/L (ref 39–117)
ALT SERPL W P-5'-P-CCNC: 13 U/L (ref 1–33)
AMORPH URATE CRY URNS QL MICRO: ABNORMAL /HPF
ANION GAP SERPL CALCULATED.3IONS-SCNC: 11.3 MMOL/L (ref 5–15)
AST SERPL-CCNC: 20 U/L (ref 1–32)
BACTERIA UR QL AUTO: ABNORMAL /HPF
BILIRUB SERPL-MCNC: 1.5 MG/DL (ref 0–1.2)
BILIRUB UR QL STRIP: NEGATIVE
BUN SERPL-MCNC: 13 MG/DL (ref 8–23)
BUN/CREAT SERPL: 11.4 (ref 7–25)
CALCIUM SPEC-SCNC: 10.1 MG/DL (ref 8.6–10.5)
CHLORIDE SERPL-SCNC: 107 MMOL/L (ref 98–107)
CHOLEST SERPL-MCNC: 184 MG/DL (ref 0–200)
CLARITY UR: ABNORMAL
CO2 SERPL-SCNC: 22.7 MMOL/L (ref 22–29)
COD CRY URNS QL: PRESENT /HPF
COLOR UR: ABNORMAL
CREAT SERPL-MCNC: 1.14 MG/DL (ref 0.57–1)
EGFRCR SERPLBLD CKD-EPI 2021: 53.9 ML/MIN/1.73
GLOBULIN UR ELPH-MCNC: 2.6 GM/DL
GLUCOSE SERPL-MCNC: 85 MG/DL (ref 65–99)
GLUCOSE UR STRIP-MCNC: NEGATIVE MG/DL
HBA1C MFR BLD: 6 % (ref 4.8–5.6)
HDLC SERPL-MCNC: 52 MG/DL (ref 40–60)
HGB UR QL STRIP.AUTO: NEGATIVE
HYALINE CASTS UR QL AUTO: ABNORMAL /LPF
KETONES UR QL STRIP: ABNORMAL
LDLC SERPL CALC-MCNC: 109 MG/DL (ref 0–100)
LDLC/HDLC SERPL: 2.05 {RATIO}
LEUKOCYTE ESTERASE UR QL STRIP.AUTO: ABNORMAL
NITRITE UR QL STRIP: NEGATIVE
PH UR STRIP.AUTO: <=5 [PH] (ref 5–8)
POTASSIUM SERPL-SCNC: 4.4 MMOL/L (ref 3.5–5.2)
PROT SERPL-MCNC: 6.9 G/DL (ref 6–8.5)
PROT UR QL STRIP: ABNORMAL
RBC # UR STRIP: ABNORMAL /HPF
REF LAB TEST METHOD: ABNORMAL
SODIUM SERPL-SCNC: 141 MMOL/L (ref 136–145)
SP GR UR STRIP: >1.03 (ref 1–1.03)
SQUAMOUS #/AREA URNS HPF: ABNORMAL /HPF
TRIGL SERPL-MCNC: 127 MG/DL (ref 0–150)
TSH SERPL DL<=0.05 MIU/L-ACNC: 1.33 UIU/ML (ref 0.27–4.2)
UROBILINOGEN UR QL STRIP: ABNORMAL
VLDLC SERPL-MCNC: 23 MG/DL (ref 5–40)
WBC # UR STRIP: ABNORMAL /HPF

## 2024-10-04 NOTE — TELEPHONE ENCOUNTER
Caller: Bianka Fisher    Relationship: Self    Best call back number: [phone]     What are your concerns: PATIENT WAS SUPPOSE TO HAVE SOMETHING FOR SLEEP SENT TO [facility]. PATIENT WAS SEEN BY DR. AHMADI ON 10/3/24.   PLEASE ADVISE ONCE THIS MEDICATION HAS BEEN CALLED IN.     [facility]/pharmacy #[id number] - [place], [place] - [address] - [phone]  - [phone] FX

## 2024-10-09 ENCOUNTER — TELEPHONE (OUTPATIENT)
Dept: FAMILY MEDICINE CLINIC | Facility: CLINIC | Age: 64
End: 2024-10-09

## 2024-10-09 RX ORDER — SUVOREXANT 10 MG/1
10 TABLET, FILM COATED ORAL NIGHTLY
Qty: 30 TABLET | Refills: 0 | Status: SHIPPED | OUTPATIENT
Start: 2024-10-09

## 2024-10-11 ENCOUNTER — PRIOR AUTHORIZATION (OUTPATIENT)
Dept: FAMILY MEDICINE CLINIC | Facility: CLINIC | Age: 64
End: 2024-10-11
Payer: COMMERCIAL

## 2024-10-11 NOTE — TELEPHONE ENCOUNTER
Drug  Belsomra 10MG tablets    Patients insurance requires it to be called in.   Form  [employer] Call-In Form  Phone: [phone]    Could not get in touch with an agent. Will try again later.

## 2024-10-18 NOTE — TELEPHONE ENCOUNTER
Patient states she has not taken any past medications for insomnia.     Started PA through [employer] via phone.    They started the PA process  Awaiting to hear back from insurance on approval.       Ticket # [id number]

## 2024-10-22 ENCOUNTER — TELEPHONE (OUTPATIENT)
Dept: FAMILY MEDICINE CLINIC | Facility: CLINIC | Age: 64
End: 2024-10-22
Payer: COMMERCIAL

## 2024-10-22 NOTE — TELEPHONE ENCOUNTER
Patient no longer wants this. She is requesting lunesta to be sent to a different pharmacy. I have sent this request in a different encounter to PCP.

## 2024-10-22 NOTE — TELEPHONE ENCOUNTER
Caller: [facility], PHARMACY     Relationship:     Best call back number: [phone]     What is the best time to reach you: ANY    Who are you requesting to speak with (clinical staff, provider,  specific staff member): NURSE    Do you know the name of the person who called: SHALINI ZALDIVAR    What was the call regarding:  BELSOMRA REJECTING AND NEEDS CLINICAL INFORMATION.  REJECTING FOR QUANTITY 30 FOR 30.  NEEDS TO BE 10 FOR 10 DAYS TO GO THROUGH.  THEY DID NOT RECEIVE PAPERWORK FOR PRIOR AUTH THE 30 FOR 30, PLEASE REFAX [phone].  PLEASE REACH OUT TO PATIENT FOR FURTHER INSTRUCTIONS AND WORK ON PRIOR AUTHORIZATION.  WITH THE QUANTITY LIMITATIONS, THIS MEDICATION INDICATED FOR SHORT TERM TREATMENT, NOT LONG TERM.      Is it okay if the provider responds through [employer]: CALL IF NEEDED

## 2024-10-30 ENCOUNTER — OFFICE VISIT (OUTPATIENT)
Dept: CARDIOLOGY | Facility: CLINIC | Age: 64
End: 2024-10-30
Payer: COMMERCIAL

## 2024-10-30 VITALS
WEIGHT: 170.4 LBS | OXYGEN SATURATION: 98 % | HEART RATE: 63 BPM | HEIGHT: 62 IN | BODY MASS INDEX: 31.36 KG/M2 | SYSTOLIC BLOOD PRESSURE: 130 MMHG | DIASTOLIC BLOOD PRESSURE: 67 MMHG

## 2024-10-30 DIAGNOSIS — I70.0 ATHEROSCLEROSIS OF AORTA: ICD-10-CM

## 2024-10-30 DIAGNOSIS — E78.2 MIXED HYPERLIPIDEMIA: Primary | ICD-10-CM

## 2024-10-30 DIAGNOSIS — I10 ESSENTIAL HYPERTENSION: ICD-10-CM

## 2024-10-30 PROCEDURE — 99214 OFFICE O/P EST MOD 30 MIN: CPT | Performed by: INTERNAL MEDICINE

## 2024-10-30 NOTE — PROGRESS NOTES
"[facility]  Office visit  Bianka Fisher  1960  [phone]    VISIT DATE:  10/30/2024      PCP: David Mckeon DO  [id number] [address]    CC:  Chief Complaint   Patient presents with    Essential hypertension     Previous cardiac studies and procedures:  August 2017  ABIs: Normal  Stress echocardiogram  Pt. Describes chest discomfort as \" my chest feels tired\" resolved in recovery  Preserved functional capacity  Left ventricular systolic function is normal.  Normal stress echo with no significant echocardiographic evidence for myocardial ischemia.      August 2020 exercise myocardial perfusion imaging  Mild calcifications visualized in the proximal LAD. Moderate aortic arch calcification visualized.  Pt denied chest pain, pressure. Reported slight SOA.  Expected exercise time: 6:55, actual time: 5:21  No ECG evidence of myocardial ischemia  Left ventricular ejection fraction is normal (Calculated EF = 68%).  Myocardial perfusion imaging indicates a normal myocardial perfusion study with no evidence of ischemia.  Impressions are consistent with a low risk study.      ASSESSMENT:   Diagnosis Plan   1. Mixed hyperlipidemia        2. Essential hypertension        3. Atherosclerosis of aorta            PLAN:  Hyperlipidemia: Goal LDL less than 100, ideally less than 70. continue pitavastatin 4 mg p.o. daily, goal LDL less than 100, intolerance to multiple other statins.  Continue Zetia 10 mg p.o. daily.  Will consider switching to either PCSK9 inhibitor or Leqvio if Livalo becomes cost prohibitive.    Hypertension: Goal less than 130/80 mmHg.  Currently well controlled.  Continue current medical therapy.    Atypical angina: Negative ischemia evaluation, cannot exclude episodes of vasospasm for more severe episodes.  Currently asymptomatic.    Coronary and aortic calcifications: Continue aggressive risk factor modification.  Not recommending antiplatelet " "therapy at this time.  Continue afterload reduction, titrating pharmacologic therapy for hyperlipidemia.  Encouraged regular exercise and dietary modifications for healthy diet.     Palpitations: No high risk clinical features.  Will arrange for ambulatory ECG monitor if increases in frequency or duration.    Subjective  Prediabetic.  Blood pressures running less than 140/90 mmHg.. She is compliant with medical therapy.  Reviewed recent lipid profile.  Rare brief palpitations lasting for few seconds.  Onset at rest.  No obvious triggers.    PHYSICAL EXAMINATION:  Vitals:    10/30/24 1459   BP: 130/67   BP Location: Left arm   Patient Position: Sitting   Pulse: 63   SpO2: 98%   Weight: 77.3 kg (170 lb 6.4 oz)   Height: 157.5 cm (62\")         General Appearance:    Alert, cooperative, no distress, appears stated age   Head:    Normocephalic, without obvious abnormality, atraumatic   Eyes:    conjunctiva/corneas clear   Nose:   Nares normal, septum midline, mucosa normal, no drainage   Throat:   Lips, teeth and gums normal   Neck:   Supple, symmetrical, trachea midline, no carotid    bruit or JVD   Lungs:     Clear to auscultation bilaterally, respirations unlabored   Chest Wall:    No tenderness or deformity    Heart:    Regular rate and rhythm, S1 and S2 normal, no murmur, rub   or gallop, normal carotid impulse bilaterally without bruit.   Abdomen:     Soft, non-tender   Extremities:   Extremities normal, atraumatic, no cyanosis or edema   Pulses:   2+ and symmetric all extremities   Skin:   Skin color, texture, turgor normal, no rashes or lesions       Diagnostic Data:  Procedures  Lab Results   Component Value Date    TRIG 127 10/03/2024    HDL 52 10/03/2024     Lab Results   Component Value Date    GLUCOSE 85 10/03/2024    BUN 13 10/03/2024    CREATININE 1.14 (H) 10/03/2024     10/03/2024    K 4.4 10/03/2024     10/03/2024    CO2 22.7 10/03/2024     Lab Results   Component Value Date    HGBA1C 6.00 " (H) 10/03/2024     Lab Results   Component Value Date    WBC 9.57 10/03/2024    HGB 14.0 10/03/2024    HCT 41.5 10/03/2024     10/03/2024       Allergies  Allergies   Allergen Reactions    Crestor [Rosuvastatin Calcium] Hives       Current Medications    Current Outpatient Medications:     colestipol (COLESTID) 1 g tablet, Take 1 tablet by mouth 2 (Two) Times a Day., Disp: , Rfl:     eszopiclone (Lunesta) 2 MG tablet, Take 1 tablet by mouth Every Night. Take immediately before bedtime, Disp: 30 tablet, Rfl: 0    ezetimibe (ZETIA) 10 MG tablet, TAKE 1 TABLET BY MOUTH EVERY DAY, Disp: 30 tablet, Rfl: 1    losartan (COZAAR) 25 MG tablet, TAKE 1 TABLET BY MOUTH EVERY DAY, Disp: 90 tablet, Rfl: 0    Pitavastatin Calcium (Livalo) 4 MG tablet, Take 1 tablet by mouth every night at bedtime., Disp: 30 tablet, Rfl: 11    propranolol LA (INDERAL LA) 60 MG 24 hr capsule, Take 1 capsule by mouth Daily., Disp: 30 capsule, Rfl: 3    Vitamin D3 50 MCG (2000 UT) capsule, TAKE 1 CAPSULE BY MOUTH EVERY DAY, Disp: 90 capsule, Rfl: 3    estradiol (Vagifem) 10 MCG tablet vaginal tablet, 1 per Vagina qhs twice weekly (Patient not taking: Reported on 10/30/2024), Disp: 8 tablet, Rfl: 2    vitamin B-12 (CYANOCOBALAMIN) 1000 MCG tablet, Take 1 tablet by mouth Daily. (Patient not taking: Reported on 10/30/2024), Disp: 90 tablet, Rfl: 3          ROS  Review of Systems   Cardiovascular:  Positive for chest pain and leg swelling. Negative for dyspnea on exertion and palpitations.   Respiratory:  Negative for cough, shortness of breath, snoring and wheezing.    Neurological:  Positive for dizziness.   Allergic/Immunologic: Positive for environmental allergies.       SOCIAL HX  Social History     Socioeconomic History    Marital status:    Tobacco Use    Smoking status: Never     Passive exposure: Past    Smokeless tobacco: Never   Vaping Use    Vaping status: Every Day    Substances: THC    Devices: Bonobosble tank   Substance and  Sexual Activity    Alcohol use: No    Drug use: No    Sexual activity: Yes     Partners: Male     Birth control/protection: Vasectomy       FAMILY HX  Family History   Problem Relation Age of Onset    Lung cancer Mother     Cancer Mother         Lung cancer    Skin cancer Sister     Alcohol abuse Sister     Alcohol abuse Brother     COPD Brother     Dementia Maternal Aunt          with alzheimers    Alzheimer's disease Maternal Uncle     Cancer Maternal Uncle     Mental illness Maternal Uncle     Alzheimer's disease Paternal Aunt     Cancer Maternal Grandmother     Heart disease Maternal Grandfather     Breast cancer Neg Hx     Endometrial cancer Neg Hx     Ovarian cancer Neg Hx              Valentino Bates III, MD, FACC

## 2024-11-08 RX ORDER — EZETIMIBE 10 MG/1
TABLET ORAL
Qty: 30 TABLET | Refills: 2 | Status: SHIPPED | OUTPATIENT
Start: 2024-11-08

## 2024-11-25 DIAGNOSIS — I10 ESSENTIAL HYPERTENSION: ICD-10-CM

## 2024-11-25 DIAGNOSIS — F51.01 PRIMARY INSOMNIA: ICD-10-CM

## 2024-11-25 DIAGNOSIS — G25.0 TREMOR, ESSENTIAL: ICD-10-CM

## 2024-11-25 RX ORDER — PROPRANOLOL HYDROCHLORIDE 60 MG/1
CAPSULE, EXTENDED RELEASE ORAL
Qty: 90 CAPSULE | Refills: 1 | Status: SHIPPED | OUTPATIENT
Start: 2024-11-25

## 2024-11-25 RX ORDER — LOSARTAN POTASSIUM 25 MG/1
TABLET ORAL
Qty: 90 TABLET | Refills: 0 | Status: SHIPPED | OUTPATIENT
Start: 2024-11-25

## 2024-11-25 RX ORDER — EZETIMIBE 10 MG/1
TABLET ORAL
Qty: 90 TABLET | Refills: 1 | Status: SHIPPED | OUTPATIENT
Start: 2024-11-25

## 2024-11-25 NOTE — TELEPHONE ENCOUNTER
Caller: Bianka Fisher    Relationship: Self    Best call back number:      [phone] (Mobile)     Requested Prescriptions:   Requested Prescriptions     Pending Prescriptions Disp Refills    eszopiclone (Lunesta) 2 MG tablet 30 tablet 0     Sig: Take 1 tablet by mouth Every Night. Take immediately before bedtime      Pharmacy where request should be sent: [facility] [id number] [phone] [facility] [phone]    Last office visit with prescribing clinician: 10/3/2024   Last telemedicine visit with prescribing clinician: Visit date not found   Next office visit with prescribing clinician: 11/25/2024     Additional details provided by patient:  PATIENT IS OUT OF THE MEDICATION     Does the patient have less than a 3 day supply:  [x] Yes  [] No    Would you like a call back once the refill request has been completed: [] Yes [x] No    If the office needs to give you a call back, can they leave a voicemail: [] Yes [x] No

## 2024-11-25 NOTE — TELEPHONE ENCOUNTER
Rx Refill Note  Requested Prescriptions     Pending Prescriptions Disp Refills    propranolol LA (INDERAL LA) 60 MG 24 hr capsule [Pharmacy Med Name: PROPRANOLOL ER 60 MG CAPSULE] 90 capsule 1     Sig: TAKE 1 CAPSULE BY MOUTH EVERY DAY-CAN'T FILL AT 6941    losartan (COZAAR) 25 MG tablet [Pharmacy Med Name: LOSARTAN POTASSIUM 25 MG TAB] 90 tablet 0     Sig: TAKE 1 TABLET BY MOUTH EVERY DAY      Last office visit with prescribing clinician: 10/3/2024   Last telemedicine visit with prescribing clinician: Visit date not found   Next office visit with prescribing clinician: Visit date not found                         Would you like a call back once the refill request has been completed: [] Yes [] No    If the office needs to give you a call back, can they leave a voicemail: [] Yes [] No    Sena Masters MA  11/25/24, 14:02 EST

## 2024-11-25 NOTE — TELEPHONE ENCOUNTER
Caller: Bianka Fisher    Relationship: Self    Best call back number: [phone]    Requested Prescriptions:   Requested Prescriptions     Pending Prescriptions Disp Refills    eszopiclone (Lunesta) 2 MG tablet 30 tablet 0     Sig: Take 1 tablet by mouth Every Night. Take immediately before bedtime        Pharmacy where request should be sent: [facility] [id number] [address] [phone] [facility] [phone]      Last office visit with prescribing clinician: 10/3/2024   Last telemedicine visit with prescribing clinician: Visit date not found   Next office visit with prescribing clinician: Visit date not found     Additional details provided by patient: PATIENT NEEDS REFILL. PATIENT STATES SHE DOESN'T HAVE INSURANCE WANTS TO MAKE SURE SHE GETS 30 DAYS OR 90 DAYS SINCE SHE USES A DISCOUNT CARD.     Does the patient have less than a 3 day supply:  [x] Yes  [] No    Would you like a call back once the refill request has been completed: [] Yes [x] No    If the office needs to give you a call back, can they leave a voicemail: [] Yes [x] No    Nina Lange Rep   11/25/24 09:08 EST

## 2024-11-26 RX ORDER — ESZOPICLONE 2 MG/1
2 TABLET, FILM COATED ORAL NIGHTLY
Qty: 30 TABLET | Refills: 0 | Status: SHIPPED | OUTPATIENT
Start: 2024-11-26

## 2025-02-17 ENCOUNTER — TELEPHONE (OUTPATIENT)
Dept: FAMILY MEDICINE CLINIC | Facility: CLINIC | Age: 65
End: 2025-02-17

## 2025-02-17 ENCOUNTER — TELEPHONE (OUTPATIENT)
Dept: CARDIOLOGY | Facility: CLINIC | Age: 65
End: 2025-02-17
Payer: COMMERCIAL

## 2025-02-17 NOTE — TELEPHONE ENCOUNTER
PT stated she would like to wait and start the repatha after she switches her insurance.  She  currently has another month left of her livalo.  She will call back when her new insurance is in place.

## 2025-02-17 NOTE — TELEPHONE ENCOUNTER
Patient called and stated she wanted to transfer care to [facility] because she does not want to see torres anymore for personal reasons. I did inform her that she would have to come in and fill out a form for that. She hung up so I called back thinking maybe we got disconnected she stated that we did not, she did hang up because this office puts her through nonsense.

## 2025-02-17 NOTE — TELEPHONE ENCOUNTER
Pt called stating that she would be getting medicare advantage and she was told that it would not cover the livalo.  She is asking for an alternate medication.    Please advise

## 2025-03-12 ENCOUNTER — SPECIALTY PHARMACY (OUTPATIENT)
Dept: CARDIOLOGY | Facility: CLINIC | Age: 65
End: 2025-03-12
Payer: COMMERCIAL

## 2025-03-18 ENCOUNTER — SPECIALTY PHARMACY (OUTPATIENT)
Facility: HOSPITAL | Age: 65
End: 2025-03-18
Payer: COMMERCIAL
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
## 2025-03-25 ENCOUNTER — LAB (OUTPATIENT)
Age: 65
End: 2025-03-25
Payer: MEDICARE

## 2025-03-25 ENCOUNTER — OFFICE VISIT (OUTPATIENT)
Age: 65
End: 2025-03-25
Payer: MEDICARE

## 2025-03-25 VITALS
DIASTOLIC BLOOD PRESSURE: 78 MMHG | HEIGHT: 62 IN | BODY MASS INDEX: 30.8 KG/M2 | WEIGHT: 167.38 LBS | HEART RATE: 65 BPM | OXYGEN SATURATION: 98 % | SYSTOLIC BLOOD PRESSURE: 128 MMHG

## 2025-03-25 DIAGNOSIS — N18.31 STAGE 3A CHRONIC KIDNEY DISEASE: ICD-10-CM

## 2025-03-25 DIAGNOSIS — E78.2 MIXED HYPERLIPIDEMIA: ICD-10-CM

## 2025-03-25 DIAGNOSIS — I73.9 CLAUDICATION: Primary | ICD-10-CM

## 2025-03-25 DIAGNOSIS — R60.0 PERIPHERAL EDEMA: ICD-10-CM

## 2025-03-25 DIAGNOSIS — I73.9 CLAUDICATION: ICD-10-CM

## 2025-03-25 DIAGNOSIS — H93.13 TINNITUS OF BOTH EARS: Primary | ICD-10-CM

## 2025-03-25 DIAGNOSIS — G25.0 TREMOR, ESSENTIAL: ICD-10-CM

## 2025-03-25 DIAGNOSIS — Z79.899 CONTROLLED SUBSTANCE AGREEMENT SIGNED: ICD-10-CM

## 2025-03-25 DIAGNOSIS — I10 ESSENTIAL HYPERTENSION: ICD-10-CM

## 2025-03-25 DIAGNOSIS — E55.9 VITAMIN D DEFICIENCY: ICD-10-CM

## 2025-03-25 DIAGNOSIS — F51.01 PRIMARY INSOMNIA: ICD-10-CM

## 2025-03-25 DIAGNOSIS — Z51.81 THERAPEUTIC DRUG MONITORING: ICD-10-CM

## 2025-03-25 PROCEDURE — 80048 BASIC METABOLIC PNL TOTAL CA: CPT | Performed by: FAMILY MEDICINE

## 2025-03-25 PROCEDURE — 36415 COLL VENOUS BLD VENIPUNCTURE: CPT | Performed by: FAMILY MEDICINE

## 2025-03-25 PROCEDURE — 82570 ASSAY OF URINE CREATININE: CPT | Performed by: FAMILY MEDICINE

## 2025-03-25 PROCEDURE — 84156 ASSAY OF PROTEIN URINE: CPT | Performed by: FAMILY MEDICINE

## 2025-03-25 RX ORDER — LOSARTAN POTASSIUM 25 MG/1
25 TABLET ORAL DAILY
Qty: 90 TABLET | Refills: 1 | Status: SHIPPED | OUTPATIENT
Start: 2025-03-25

## 2025-03-25 RX ORDER — EZETIMIBE 10 MG/1
10 TABLET ORAL DAILY
Qty: 90 TABLET | Refills: 1 | Status: SHIPPED | OUTPATIENT
Start: 2025-03-25

## 2025-03-25 RX ORDER — ZOLPIDEM TARTRATE 5 MG/1
5 TABLET ORAL NIGHTLY
Qty: 30 TABLET | Refills: 2 | Status: SHIPPED | OUTPATIENT
Start: 2025-03-25

## 2025-03-25 RX ORDER — PROPRANOLOL HYDROCHLORIDE 60 MG/1
60 CAPSULE, EXTENDED RELEASE ORAL NIGHTLY
Qty: 90 CAPSULE | Refills: 3 | Status: SHIPPED | OUTPATIENT
Start: 2025-03-25

## 2025-03-25 RX ORDER — SUCRALFATE 1 G/1
1 TABLET ORAL 4 TIMES DAILY
COMMUNITY

## 2025-03-25 RX ORDER — ACETAMINOPHEN 160 MG
2000 TABLET,DISINTEGRATING ORAL DAILY
Qty: 90 CAPSULE | Refills: 3 | Status: SHIPPED | OUTPATIENT
Start: 2025-03-25

## 2025-03-26 ENCOUNTER — RESULTS FOLLOW-UP (OUTPATIENT)
Age: 65
End: 2025-03-26
Payer: MEDICARE

## 2025-03-26 LAB
ANION GAP SERPL CALCULATED.3IONS-SCNC: 14.8 MMOL/L (ref 5–15)
BUN SERPL-MCNC: 12 MG/DL (ref 8–23)
BUN/CREAT SERPL: 11.3 (ref 7–25)
CALCIUM SPEC-SCNC: 10.3 MG/DL (ref 8.6–10.5)
CHLORIDE SERPL-SCNC: 101 MMOL/L (ref 98–107)
CO2 SERPL-SCNC: 24.2 MMOL/L (ref 22–29)
CREAT SERPL-MCNC: 1.06 MG/DL (ref 0.57–1)
CREAT UR-MCNC: 277.3 MG/DL
EGFRCR SERPLBLD CKD-EPI 2021: 58.4 ML/MIN/1.73
GLUCOSE SERPL-MCNC: 100 MG/DL (ref 65–99)
POTASSIUM SERPL-SCNC: 4 MMOL/L (ref 3.5–5.2)
PROT ?TM UR-MCNC: 11.6 MG/DL
PROT/CREAT UR: 41.8 MG/G CREA (ref 0–200)
SODIUM SERPL-SCNC: 140 MMOL/L (ref 136–145)

## 2025-03-27 ENCOUNTER — PATIENT MESSAGE (OUTPATIENT)
Age: 65
End: 2025-03-27
Payer: MEDICARE

## 2025-03-27 ENCOUNTER — PATIENT ROUNDING (BHMG ONLY) (OUTPATIENT)
Age: 65
End: 2025-03-27
Payer: MEDICARE

## 2025-03-28 LAB
1OH-MIDAZOLAM UR QL SCN: NOT DETECTED NG/MG CREAT
6MAM UR QL SCN: NEGATIVE NG/ML
7AMINOCLONAZEPAM/CREAT UR: NOT DETECTED NG/MG CREAT
A-OH ALPRAZ/CREAT UR: NOT DETECTED NG/MG CREAT
A-OH-TRIAZOLAM/CREAT UR CFM: NOT DETECTED NG/MG CREAT
ACP UR QL CFM: NOT DETECTED
ALPRAZ/CREAT UR CFM: NOT DETECTED NG/MG CREAT
AMPHETAMINES UR QL SCN: NEGATIVE NG/ML
APAP UR QL SCN: NEGATIVE UG/ML
BARBITURATES UR QL SCN: NEGATIVE NG/ML
BENZODIAZ SCN METH UR: NEGATIVE
BUPRENORPHINE UR QL SCN: NEGATIVE
BUPRENORPHINE/CREAT UR: NOT DETECTED NG/MG CREAT
CANNABINOIDS UR QL CFM: NORMAL
CANNABINOIDS UR QL SCN: NORMAL NG/ML
CARBOXYTHC UR CFM-MCNC: 196 NG/MG CREAT
CARISOPRODOL UR QL: NEGATIVE NG/ML
CLONAZEPAM/CREAT UR CFM: NOT DETECTED NG/MG CREAT
COCAINE+BZE UR QL SCN: NEGATIVE NG/ML
CREAT UR-MCNC: 284 MG/DL
D-METHORPHAN UR-MCNC: NOT DETECTED NG/ML
D-METHORPHAN+LEVORPHANOL UR QL: NOT DETECTED
DESALKYLFLURAZ/CREAT UR: NOT DETECTED NG/MG CREAT
DIAZEPAM/CREAT UR: NOT DETECTED NG/MG CREAT
ETHANOL UR QL SCN: NEGATIVE G/DL
ETHANOL UR QL SCN: NEGATIVE NG/ML
FENTANYL CTO UR SCN-MCNC: NEGATIVE NG/ML
FENTANYL/CREAT UR: NOT DETECTED NG/MG CREAT
FLUNITRAZEPAM UR QL SCN: NOT DETECTED NG/MG CREAT
GABAPENTIN UR-MCNC: NEGATIVE UG/ML
HALLUCINOGENS UR: NEGATIVE
HYPNOTICS UR QL SCN: NEGATIVE
KETAMINE UR QL: NOT DETECTED
LORAZEPAM/CREAT UR: NOT DETECTED NG/MG CREAT
MEPERIDINE UR QL SCN: NEGATIVE NG/ML
METHADONE UR QL SCN: NEGATIVE NG/ML
METHADONE+METAB UR QL SCN: NEGATIVE NG/ML
MIDAZOLAM/CREAT UR CFM: NOT DETECTED NG/MG CREAT
MISCELLANEOUS, UR: NEGATIVE
NORBUPRENORPHINE/CREAT UR: NOT DETECTED NG/MG CREAT
NORDIAZEPAM/CREAT UR: NOT DETECTED NG/MG CREAT
NORFENTANYL/CREAT UR: NOT DETECTED NG/MG CREAT
NORFLUNITRAZEPAM UR-MCNC: NOT DETECTED NG/MG CREAT
NORKETAMINE UR-MCNC: NOT DETECTED UG/ML
OPIATES UR SCN-MCNC: NEGATIVE NG/ML
OXAZEPAM/CREAT UR: NOT DETECTED NG/MG CREAT
OXYCODONE CTO UR SCN-MCNC: NEGATIVE NG/ML
PCP UR QL SCN: NEGATIVE NG/ML
PRESCRIBED MEDICATIONS: NORMAL
PROPOXYPH UR QL SCN: NEGATIVE NG/ML
TAPENTADOL CTO UR SCN-MCNC: NEGATIVE NG/ML
TEMAZEPAM/CREAT UR: NOT DETECTED NG/MG CREAT
TRAMADOL UR QL SCN: NEGATIVE NG/ML
ZALEPLON UR-MCNC: NOT DETECTED NG/ML
ZOLPIDEM PHENYL-4-CARB UR QL SCN: NOT DETECTED
ZOLPIDEM UR QL SCN: NOT DETECTED
ZOPICLONE-N-OXIDE UR-MCNC: NOT DETECTED NG/ML

## 2025-04-25 ENCOUNTER — LAB (OUTPATIENT)
Age: 65
End: 2025-04-25
Payer: MEDICARE

## 2025-04-25 ENCOUNTER — OFFICE VISIT (OUTPATIENT)
Age: 65
End: 2025-04-25
Payer: MEDICARE

## 2025-04-25 VITALS
HEIGHT: 62 IN | OXYGEN SATURATION: 98 % | BODY MASS INDEX: 30.42 KG/M2 | SYSTOLIC BLOOD PRESSURE: 126 MMHG | WEIGHT: 165.31 LBS | HEART RATE: 76 BPM | DIASTOLIC BLOOD PRESSURE: 74 MMHG

## 2025-04-25 DIAGNOSIS — N18.31 STAGE 3A CHRONIC KIDNEY DISEASE: ICD-10-CM

## 2025-04-25 DIAGNOSIS — R73.03 PREDIABETES: ICD-10-CM

## 2025-04-25 DIAGNOSIS — Z00.00 MEDICARE WELCOME EXAM: Primary | ICD-10-CM

## 2025-04-25 DIAGNOSIS — E55.9 VITAMIN D DEFICIENCY: ICD-10-CM

## 2025-04-25 DIAGNOSIS — F51.01 PRIMARY INSOMNIA: ICD-10-CM

## 2025-04-25 DIAGNOSIS — Z78.0 MENOPAUSE: ICD-10-CM

## 2025-04-25 DIAGNOSIS — Z13.6 ENCOUNTER FOR SCREENING FOR CARDIOVASCULAR DISORDERS: ICD-10-CM

## 2025-04-25 DIAGNOSIS — Z12.11 SCREENING FOR MALIGNANT NEOPLASM OF COLON: ICD-10-CM

## 2025-04-25 DIAGNOSIS — Z12.31 ENCOUNTER FOR SCREENING MAMMOGRAM FOR BREAST CANCER: ICD-10-CM

## 2025-04-25 DIAGNOSIS — Z23 IMMUNIZATION DUE: ICD-10-CM

## 2025-04-25 DIAGNOSIS — Z13.820 ENCOUNTER FOR SCREENING FOR OSTEOPOROSIS: ICD-10-CM

## 2025-04-25 PROBLEM — H93.13 TINNITUS OF BOTH EARS: Status: ACTIVE | Noted: 2025-04-25

## 2025-04-25 LAB
25(OH)D3 SERPL-MCNC: 36.6 NG/ML (ref 30–100)
ANION GAP SERPL CALCULATED.3IONS-SCNC: 10 MMOL/L (ref 5–15)
BUN SERPL-MCNC: 10 MG/DL (ref 8–23)
BUN/CREAT SERPL: 7.8 (ref 7–25)
CALCIUM SPEC-SCNC: 10.1 MG/DL (ref 8.6–10.5)
CHLORIDE SERPL-SCNC: 108 MMOL/L (ref 98–107)
CO2 SERPL-SCNC: 24 MMOL/L (ref 22–29)
CREAT SERPL-MCNC: 1.28 MG/DL (ref 0.57–1)
EGFRCR SERPLBLD CKD-EPI 2021: 46.6 ML/MIN/1.73
GLUCOSE SERPL-MCNC: 104 MG/DL (ref 65–99)
POTASSIUM SERPL-SCNC: 4.2 MMOL/L (ref 3.5–5.2)
SODIUM SERPL-SCNC: 142 MMOL/L (ref 136–145)

## 2025-04-25 PROCEDURE — 83036 HEMOGLOBIN GLYCOSYLATED A1C: CPT | Performed by: FAMILY MEDICINE

## 2025-04-25 PROCEDURE — 82306 VITAMIN D 25 HYDROXY: CPT | Performed by: FAMILY MEDICINE

## 2025-04-25 PROCEDURE — 36415 COLL VENOUS BLD VENIPUNCTURE: CPT | Performed by: FAMILY MEDICINE

## 2025-04-25 PROCEDURE — 80048 BASIC METABOLIC PNL TOTAL CA: CPT | Performed by: FAMILY MEDICINE

## 2025-04-25 NOTE — PROGRESS NOTES
Subjective   The ABCs of the Annual Wellness Visit  Medicare Wellness Visit      Bianka Fisher is a 65 y.o. patient who presents for a Medicare Wellness Visit.    The following portions of the patient's history were reviewed and   updated as appropriate: allergies, current medications, past family history, past medical history, past social history, past surgical history, and problem list.    Compared to one year ago, the patient's physical   health is worse.  Compared to one year ago, the patient's mental   health is the same.    Recent Hospitalizations:  She was not admitted to the hospital during the last year.     Current Medical Providers:  Patient Care Team:  Karin Marsh MD as PCP - General (Family Medicine)  Valentino Bates III, MD as Cardiologist (Cardiology)  Trav Bob MD as Consulting Physician (Gastroenterology)  Abdirizak Bowman MD as Consulting Physician (Otolaryngology)    Outpatient Medications Prior to Visit   Medication Sig Dispense Refill    Cholecalciferol (Vitamin D3) 50 MCG (2000 UT) capsule Take 1 capsule by mouth Daily. 90 capsule 3    colestipol (COLESTID) 1 g tablet Take 1 tablet by mouth 2 (Two) Times a Day.      Evolocumab (REPATHA) solution auto-injector SureClick injection Inject 1 mL under the skin into the appropriate area as directed Every 14 (Fourteen) Days. 6 mL 3    ezetimibe (ZETIA) 10 MG tablet Take 1 tablet by mouth Daily. 90 tablet 1    losartan (COZAAR) 25 MG tablet Take 1 tablet by mouth Daily. 90 tablet 1    propranolol LA (INDERAL LA) 60 MG 24 hr capsule Take 1 capsule by mouth Every Night. 90 capsule 3    sucralfate (CARAFATE) 1 g tablet Take 1 tablet by mouth 4 (Four) Times a Day.      zolpidem (AMBIEN) 5 MG tablet Take 1 tablet by mouth Every Night. 30 tablet 2     No facility-administered medications prior to visit.     No opioid medication identified on active medication list. I have reviewed chart for other potential  high risk medication/s and  "harmful drug interactions in the elderly.      Aspirin is not on active medication list.  Aspirin use is not indicated based on review of current medical condition/s. Risk of harm outweighs potential benefits.  .    Patient Active Problem List   Diagnosis    Precordial pain    Mixed hyperlipidemia    Claudication    Atherosclerosis of aorta    Essential hypertension    GERD (gastroesophageal reflux disease)    Tremor, essential    Memory impairment    Class 1 obesity due to excess calories with serious comorbidity and body mass index (BMI) of 32.0 to 32.9 in adult    Controlled substance agreement signed    Primary insomnia    Stage 3a chronic kidney disease    Prediabetes    Tinnitus of both ears     Advance Care Planning Advance Directive is not on file.  ACP discussion was held with the patient during this visit. Patient does not have an advance directive, information provided.            Objective   Vitals:    04/25/25 1353   BP: 126/74   BP Location: Left arm   Patient Position: Sitting   Cuff Size: Adult   Pulse: 76   SpO2: 98%   Weight: 75 kg (165 lb 5 oz)   Height: 157 cm (61.81\")   PainSc: 0-No pain       Estimated body mass index is 30.42 kg/m² as calculated from the following:    Height as of this encounter: 157 cm (61.81\").    Weight as of this encounter: 75 kg (165 lb 5 oz).           Gait and Balance Evaluation:  Normal    Does the patient have evidence of cognitive impairment? No       ECG 12 Lead    Date/Time: 4/25/2025 2:39 PM  Performed by: Karin Marsh MD    Authorized by: Karin Marsh MD  Comparison: compared with previous ECG from 7/26/2023  Similar to previous ECG  Rhythm: sinus rhythm  Rate: normal  BPM: 62  Conduction: conduction normal  ST Segments: ST segments normal  T Waves: T waves normal  QRS axis: normal    Clinical impression: normal ECG                                                                                                Health  Risk Assessment    Smoking " Status:  Social History     Tobacco Use   Smoking Status Never    Passive exposure: Past   Smokeless Tobacco Never     Alcohol Consumption:  Social History     Substance and Sexual Activity   Alcohol Use No       Fall Risk Screen  STEADI Fall Risk Assessment was completed, and patient is at LOW risk for falls.Assessment completed on:2025    Depression Screening   Little interest or pleasure in doing things? Not at all   Feeling down, depressed, or hopeless? Not at all   PHQ-2 Total Score 0      Health Habits and Functional and Cognitive Screenin/25/2025     1:57 PM   Functional & Cognitive Status   Do you have difficulty preparing food and eating? No   Do you have difficulty bathing yourself, getting dressed or grooming yourself? No   Do you have difficulty using the toilet? No   Do you have difficulty moving around from place to place? No   Do you have trouble with steps or getting out of a bed or a chair? No   Current Diet Unhealthy Diet   Dental Exam Not up to date   Eye Exam Up to date   Exercise (times per week) 0 times per week   Current Exercises Include No Regular Exercise   Do you need help using the phone?  No   Are you deaf or do you have serious difficulty hearing?  No   Do you need help to go to places out of walking distance? No   Do you need help shopping? No   Do you need help preparing meals?  No   Do you need help with housework?  No   Do you need help with laundry? No   Do you need help taking your medications? No   Do you need help managing money? No   Do you ever drive or ride in a car without wearing a seat belt? No   Have you felt unusual stress, anger or loneliness in the last month? No   Who do you live with? Spouse   If you need help, do you have trouble finding someone available to you? No   Have you been bothered in the last four weeks by sexual problems? No   Do you have difficulty concentrating, remembering or making decisions? No   Visual Acuity:  Vision Screening     Right eye Left eye Both eyes   Without correction      With correction 20/20 20/40 20/20     Age-appropriate Screening Schedule:  Refer to the list below for future screening recommendations based on patient's age, sex and/or medical conditions. Orders for these recommended tests are listed in the plan section. The patient has been provided with a written plan.    Health Maintenance List  Health Maintenance   Topic Date Due    DXA SCAN  Never done    Pneumococcal Vaccine 50+ (1 of 1 - PCV) Never done    ZOSTER VACCINE (1 of 2) Never done    MAMMOGRAM  05/05/2024    COVID-19 Vaccine (1 - 2024-25 season) Never done    COLORECTAL CANCER SCREENING  07/06/2025    TDAP/TD VACCINES (3 - Td or Tdap) 05/07/2025    INFLUENZA VACCINE  07/01/2025    LIPID PANEL  10/03/2025    ANNUAL WELLNESS VISIT  04/25/2026    HEPATITIS C SCREENING  Completed                                                                                                                                              Immunization History   Administered Date(s) Administered    Tdap 07/22/2010, 05/07/2015       CMS Preventative Services Quick Reference  Risk Factors Identified During Encounter  Immunizations Discussed/Encouraged: Tdap, Prevnar 20 (Pneumococcal 20-valent conjugate), and Shingrix    The above risks/problems have been discussed with the patient.  Pertinent information has been shared with the patient in the After Visit Summary.  An After Visit Summary and PPPS were made available to the patient.    Follow Up:   Next Medicare Wellness visit to be scheduled in 1 year.         Additional E&M Note during same encounter follows:  Patient has additional, significant, and separately identifiable condition(s)/problem(s) that require work above and beyond the Medicare Wellness Visit     Chief Complaint  Welcome To Medicare    Subjective    HPI  Bianka is also being seen today for an annual adult preventative physical exam.  and Bianka is also being seen today  "for additional medical problem/s.       The patient is a 65-year-old female presenting for a [place] to Medicare visit and follow-up on insomnia.    A decline in physical health over the past year is reported, while mental health remains stable. No hospitalizations have occurred within the last year, and aspirin therapy is not utilized. Interest in establishing a living will is expressed. Prediabetes has been identified, with curiosity about the potential progression to diabetes. The relationship between prediabetes and kidney disease is also a concern. Colonoscopy screenings are managed by Dr. Bob. No breast pain or lumps are reported. A bone density test has never been performed. The shingles vaccine, pneumonia vaccine, and influenza vaccine have not been received. Uncertainty exists regarding hepatitis vaccination status. Previous tetanus vaccines have been administered following dog bites, with the most recent one in 2015.    Ambien has been prescribed for sleep but has only been used 3 or 4 times due to adverse effects experienced after the first 2 uses, including irritability and grogginess for several days. Upon resuming the medication, no similar side effects were reported, and 6 to 7 hours of sleep were achieved.    Kidney disease has progressed from stage 1 to stage 3 despite long-term use of losartan 25 mg. Interest in dietary modifications specific to the condition is expressed.    Tinnitus in both ears has been experienced, and a hearing test revealed hearing loss.    Compression socks are used for leg swelling, providing comfort but not alleviating the swelling. The swelling is constant, with a slight increase towards the end of the day. Compression socks have been effective in managing leg pain.            Objective   Vital Signs:  /74 (BP Location: Left arm, Patient Position: Sitting, Cuff Size: Adult)   Pulse 76   Ht 157 cm (61.81\")   Wt 75 kg (165 lb 5 oz)   SpO2 98%   BMI 30.42 kg/m² "   Physical Exam  Constitutional:       General: She is not in acute distress.     Appearance: She is obese. She is not ill-appearing, toxic-appearing or diaphoretic.   Neck:      Thyroid: No thyromegaly.   Cardiovascular:      Rate and Rhythm: Normal rate and regular rhythm.   Pulmonary:      Effort: Pulmonary effort is normal.      Breath sounds: Normal breath sounds.   Abdominal:      General: There is no distension.      Palpations: Abdomen is soft. There is no hepatomegaly.      Tenderness: There is no abdominal tenderness.   Musculoskeletal:      Cervical back: Neck supple.      Right lower leg: No edema.      Left lower leg: No edema.   Lymphadenopathy:      Head:      Right side of head: No submandibular, preauricular or posterior auricular adenopathy.      Left side of head: No submandibular, preauricular or posterior auricular adenopathy.      Cervical: No cervical adenopathy.   Skin:     General: Skin is warm.   Neurological:      Mental Status: She is alert and oriented to person, place, and time.      Gait: Gait normal.   Psychiatric:         Mood and Affect: Mood normal.         Behavior: Behavior normal.         Thought Content: Thought content normal.         Judgment: Judgment normal.                 The following data was reviewed by: Karin Marsh MD on 04/25/2025:    Common labs          10/3/2024    14:42 3/25/2025    15:28   Common Labs   Glucose 85  100    BUN 13  12    Creatinine 1.14  1.06    Sodium 141  140    Potassium 4.4  4.0    Chloride 107  101    Calcium 10.1  10.3    Albumin 4.3     Total Bilirubin 1.5     Alkaline Phosphatase 89     AST (SGOT) 20     ALT (SGPT) 13     WBC 9.57     Hemoglobin 14.0     Hematocrit 41.5     Platelets 214     Total Cholesterol 184     Triglycerides 127     HDL Cholesterol 52     LDL Cholesterol  109     Hemoglobin A1C 6.00         Results  Labs   - A1c: 10/2024, 6           Assessment and Plan Additional age appropriate preventative wellness advice  topics were discussed during today's preventative wellness exam(some topics already addressed during AWV portion of the note above):   Nutrition: Discussed nutrition plan with patient. Information shared in after visit summary. Goal is for a well balanced diet to enhance overall health.  Diagnoses and all orders for this visit:    1. Medicare welcome exam (Primary)    2. Encounter for screening for osteoporosis  -     DEXA Bone Density Axial; Future    3. Menopause  -     DEXA Bone Density Axial; Future    4. Encounter for screening mammogram for breast cancer  -     Mammo Screening Digital Tomosynthesis Bilateral With CAD; Future    5. Encounter for screening for cardiovascular disorders  -     ECG 12 Lead    6. Immunization due    7. Stage 3a chronic kidney disease  -     Basic Metabolic Panel; Future  -     empagliflozin (Jardiance) 10 MG tablet tablet; Take 1 tablet by mouth Daily.  Dispense: 30 tablet; Refill: 2    8. Primary insomnia    9. Prediabetes  -     Basic Metabolic Panel; Future  -     Hemoglobin A1c; Future    10. Vitamin D deficiency  -     Vitamin D,25-Hydroxy; Future    11. Screening for malignant neoplasm of colon  -     Ambulatory Referral For Screening Colonoscopy           1. Health maintenance.  Her last colonoscopy was conducted on 07/06/2020, with a subsequent one due in the next 3 months. The most recent mammogram was performed in 2023, followed by diagnostic testing and an ultrasound, after which she was cleared for routine screening. She is not due for another diagnostic unless symptoms arise. She has never undergone a bone density test. Her last tetanus vaccine was administered on 05/07/2015, and she is due for another in the coming month. She has not received the shingles vaccine, pneumonia vaccine, or influenza vaccine. She is uncertain about her hepatitis vaccination status. She has previously received tetanus vaccines following dog bites, with the most recent one administered in  2015. A basic metabolic panel and A1c test will be ordered today. A colonoscopy will be scheduled for this summer. A bone density test will be arranged, and if her bones are normal, a repeat test will be conducted in 5 years. If osteopenia is detected, a repeat test will be scheduled in 2 years. If she experiences a fall resulting in a wrist or hip fracture, an earlier bone density test will be considered. She is advised to receive the tetanus vaccine at any pharmacy that offers it. The Shingrix vaccine, a two-dose shingles vaccine administered between 2 and 6 months apart, is recommended. The pneumonia vaccine is also recommended, but she has declined it at this time.    2. Insomnia.  She has been prescribed Ambien for sleep but has only used it 3 or 4 times due to adverse effects experienced after the first 2 uses. She felt irritable and groggy for several days following the initial doses, leading her to discontinue its use temporarily. Upon resuming the medication, she reported no similar side effects and was able to achieve 6 to 7 hours of sleep. If she prefers the brand name Ambien, there may be a higher outpatient co-pay, but the prescription can be changed accordingly.  She chose to continue current generic zolpidem.  Recheck in 3 months.  The treatment plan includes a controlled substance.  Controlled Substance Medication Agreement signed and on file. Reviewed UDS.  Risks, benefits, and alternative treatments reviewed.  [facility] report 04/25/25 has been reviewed.      3. Chronic kidney disease stage 3.  She has been diagnosed with kidney disease. She has been on long-term use of losartan 25 mg. She is considering the initiation of Jardiance or Farxiga and is interested in dietary modifications specific to her condition. The side effects of these medications include increased urination, urinary tract infections, and yeast infections. She is advised to report any symptoms of a urinary tract infection for further  evaluation and potential treatment initiation.  Check labs today and again at follow-up.    4. Prediabetes  Her A1c levels have been consistently elevated since 2020, indicating a prediabetic state.     5. Leg swelling.  She has been utilizing compression socks for leg swelling, which provide comfort but do not alleviate the swelling. The swelling is constant, with a slight increase towards the end of the day. The compression socks have been effective in managing leg pain. An ultrasound is scheduled in approximately 2 weeks to evaluate the severity of her leg pain in regards to claudication. Once the results are available, they will be shared with her via [employer], and coordination with Dr. Bates will be initiated if further action is required.        Follow-up  The patient will follow up in 3 months for insomnia and wellness visit again in a year.         Follow Up   Return in about 3 months (around 7/25/2025) for Office visit insomnia AND , Initial MWV 1 year.  Patient was given instructions and counseling regarding her condition or for health maintenance advice. Please see specific information pulled into the AVS if appropriate.  Patient or patient representative verbalized consent for the use of Ambient Listening during the visit with  Karin Marsh MD for chart documentation. 4/25/2025  14:33 EDT    Electronically signed by Karin Marsh MD, 04/25/25, 2:41 PM EDT.

## 2025-04-25 NOTE — PATIENT INSTRUCTIONS
Advance Care Planning and Advance Directives     You make decisions on a daily basis - decisions about where you want to live, your career, your home, your life. Perhaps one of the most important decisions you face is your choice for future medical care. Take time to talk with your family and your healthcare team and start planning today.  Advance Care Planning is a process that can help you:  Understand possible future healthcare decisions in light of your own experiences  Reflect on those decision in light of your goals and values  Discuss your decisions with those closest to you and the healthcare professionals that care for you  Make a plan by creating a document that reflects your wishes    Surrogate Decision Maker  In the event of a medical emergency, which has left you unable to communicate or to make your own decisions, you would need someone to make decisions for you.  It is important to discuss your preferences for medical treatment with this person while you are in good health.     Qualities of a surrogate decision maker:  Willing to take on this role and responsibility  Knows what you want for future medical care  Willing to follow your wishes even if they don't agree with them  Able to make difficult medical decisions under stressful circumstances    Advance Directives  These are legal documents you can create that will guide your healthcare team and decision maker(s) when needed. These documents can be stored in the electronic medical record.    Living Will - a legal document to guide your care if you have a terminal condition or a serious illness and are unable to communicate. States vary by statute in document names/types, but most forms may include one or more of the following:        -  Directions regarding life-prolonging treatments        -  Directions regarding artificially provided nutrition/hydration        -  Choosing a healthcare decision maker        -  Direction regarding organ/tissue  donation    Durable Power of  for Healthcare - this document names an -in-fact to make medical decisions for you, but it may also allow this person to make personal and financial decisions for you. Please seek the advice of an  if you need this type of document.    **Advance Directives are not required and no one may discriminate against you if you do not sign one.    Medical Orders  Many states allow specific forms/orders signed by your physician to record your wishes for medical treatment in your current state of health. This form, signed in personal communication with your physician, addresses resuscitation and other medical interventions that you may or may not want.      For more information or to schedule a time with a [facility] Advance Care Planning Facilitator contact: [facility] or call [phone] and someone will contact you directly.  You are due for Shingrix vaccination series ( the newest shingles vaccine).  It is a two shot series spaced 2-6 months apart. Please get this vaccine series started at your earliest convenience at your local pharmacy to help avoid shingles outbreak. It is more effective than the old Zostavax vaccine and is recommended even if you have had the Zostavax vaccine in the past.  Once the Shingrix series is completed, it does not need to be repeated.   For more information, please look at the website below:  https://www.cdc.gov/vaccines/vpd/shingles/public/shingrix/index.html    You are due for a Covid 19 vaccination. (provides protection against Covid 19 Viral Infection) Please  talk to your pharmacist and get the immunization at your local pharmacy at your earliest convenience. Please click on the link for more information about this vaccine.   https://www.cdc.gov/coronavirus/2019-ncov/vaccines/stay-up-to-date.html        Medicare Wellness  Personal Prevention Plan of Service     Date of Office Visit:    Encounter Provider:  Karin THOMSON  MD Maximo  Place of Service:  [facility]IMARY [facility]  Patient Name: Bianka Fisher  :  1960    As part of the Medicare Wellness portion of your visit today, we are providing you with this personalized preventive plan of services (PPPS). This plan is based upon recommendations of the [place] Preventive Services Task Force (USPSTF) and the Advisory Committee on Immunization Practices (ACIP).    This lists the preventive care services that should be considered, and provides dates of when you are due. Items listed as completed are up-to-date and do not require any further intervention.    Health Maintenance   Topic Date Due   • DXA SCAN  Never done   • Pneumococcal Vaccine 50+ (2 of 2 - PCV) 10/05/2011   • ANNUAL WELLNESS VISIT  Never done   • ZOSTER VACCINE (2 of 2) 2022   • COVID-19 Vaccine (2024- season) 2024   • MAMMOGRAM  2025   • TDAP/TD VACCINES (3 - Td or Tdap) 2025   • INFLUENZA VACCINE  2025   • LIPID PANEL  10/03/2025   • COLORECTAL CANCER SCREENING  2030   • HEPATITIS C SCREENING  Completed       Orders Placed This Encounter   Procedures   • ECG 12 Lead     Reason for Exam::   Screen for Ischemic Heart Disease, Welcome to Medicare Exam     Release to patient:   Routine Release [[id number]]       No follow-ups on file.

## 2025-04-26 ENCOUNTER — PATIENT MESSAGE (OUTPATIENT)
Age: 65
End: 2025-04-26
Payer: MEDICARE

## 2025-04-26 LAB — HBA1C MFR BLD: 5.7 % (ref 4.8–5.6)

## 2025-05-15 ENCOUNTER — HOSPITAL ENCOUNTER (OUTPATIENT)
Dept: CARDIOLOGY | Facility: HOSPITAL | Age: 65
Discharge: HOME | End: 2025-05-15
Admitting: FAMILY MEDICINE
Payer: MEDICARE

## 2025-05-15 DIAGNOSIS — I73.9 CLAUDICATION: ICD-10-CM

## 2025-05-15 LAB
BH CV LOWER ARTERIAL LEFT ABI RATIO: 1.13
BH CV LOWER ARTERIAL LEFT DORSALIS PEDIS SYS MAX: 148
BH CV LOWER ARTERIAL LEFT GREAT TOE SYS MAX: 0.84
BH CV LOWER ARTERIAL LEFT POST TIBIAL SYS MAX: 152
BH CV LOWER ARTERIAL LEFT TBI RATIO: 0.84
BH CV LOWER ARTERIAL RIGHT ABI RATIO: 1.13
BH CV LOWER ARTERIAL RIGHT DORSALIS PEDIS SYS MAX: 148
BH CV LOWER ARTERIAL RIGHT GREAT TOE SYS MAX: 0.79
BH CV LOWER ARTERIAL RIGHT POST TIBIAL SYS MAX: 152
BH CV LOWER ARTERIAL RIGHT TBI RATIO: 0.79
UPPER ARTERIAL LEFT ARM BRACHIAL SYS MAX: 135
UPPER ARTERIAL RIGHT ARM BRACHIAL SYS MAX: 135

## 2025-05-15 PROCEDURE — 93922 UPR/L XTREMITY ART 2 LEVELS: CPT

## 2025-06-09 ENCOUNTER — SPECIALTY PHARMACY (OUTPATIENT)
Dept: CARDIOLOGY | Facility: CLINIC | Age: 65
End: 2025-06-09
Payer: MEDICARE

## 2025-06-09 NOTE — PROGRESS NOTES
Specialty Pharmacy Patient Management Program  Cardiology Specialty Pharmacy Refill Outreach      Bianka is a 65 y.o. female contacted today regarding refills of her medication(s).    Specialty medication(s) and dose(s) confirmed: Repatha   Other medications being refilled: N/A    Refill Questions      Flowsheet Row Most Recent Value   Changes to allergies? No   Changes to medications? No   New conditions or infections since last clinic visit No   Unplanned office visit, urgent care, ED, or hospital admission in the last 4 weeks  No   How does patient/caregiver feel medication is working? Very good   Financial problems or insurance changes  No   Since the previous refill, were any specialty medication doses or scheduled injections missed or delayed?  No   Does this patient require a clinical escalation to a pharmacist? No            Delivery Questions      Flowsheet Row Most Recent Value   Delivery method [employer]   Delivery address verified with patient/caregiver? Yes   Delivery address Home   Number of medications in delivery 1   Medication(s) being filled and delivered Evolocumab (REPATHA)   Doses left of specialty medications 0   Copay verified? Yes   Copay amount $0 Repatha / 3 months supply   Copay form of payment No copayment ($0)   Delivery Date Selection 06/10/25   Signature Required No   Do you consent to receive electronic handouts?  No                   Follow-up: 84 days       Antonietta Butler CPhT  Pharmacy Care Coordinator  6/9/2025  13:58 EDT

## 2025-06-11 ENCOUNTER — PATIENT MESSAGE (OUTPATIENT)
Age: 65
End: 2025-06-11
Payer: MEDICARE

## 2025-06-13 NOTE — PROGRESS NOTES
Chief Complaint  Establish Care, Insomnia, pain behind R knee , and Tinnitus    Subjective        Bianka Fisher presents to [facility]  History of Present Illness    History of Present Illness  The patient is a 65-year-old female presenting today to establish care, insomnia, knee pain, and tinnitus.    She has been experiencing tinnitus for the past 2 years, which began after starting amitriptyline in 06/2023. The tinnitus is bilateral, loud, and occasionally disrupts her sleep. It has also led to a decline in her hearing. A previous ENT consultation suggested that her tinnitus might be due to frequent gum chewing, but cessation of this habit did not alleviate the symptoms. She is seeking a second opinion regarding her tinnitus.    She has been suffering from left knee pain for approximately 1 year, which is more pronounced in the posterior calf region. Her previous physician suspected a Baker's cyst and recommended an MRI, but she was unable to undergo the procedure due to lack of insurance. She reports no history of falls or knee injuries. She has not had any radiographic evaluation of her knee and does not use any over-the-counter pain medications or apply ice or heat to the area.    She has been experiencing sleep disturbances since her intermediate in 09/2023. Prior to this, she had no issues with sleep. Currently, she struggles to fall asleep independently and often remains awake until 2:00 AM. When she does sleep, it is only for a few hours, and she does not experience REM sleep or dreams, which she finds distressing. If she manages to sleep for 3 to 5 hours, she wakes up extremely early and is unable to return to sleep. She has tried various medications for her sleep issues, including amitriptyline, Lunesta, Ambien, Tylenol PM, CBD vape pens, and marijuana. She found Ambien effective many years ago and is currently seeking a medication to aid her sleep.    She experiences  "daily leg and ankle swelling, which her cardiologist attributes to her heart disease. She is uncertain if she has ever been prescribed diuretics for this condition.    She is under the care of Dr. Bates in cardiology for heart disease. She sees Dr. Paulino Ravi for gastroenterology issues because she does not have a gallbladder. She takes 2 different medications as needed for that. Her colonoscopy is due this year too. She has not been to see her gynecologist in a couple of years but does see somebody through [facility]. She has not been back for her regular exam or mammogram. She also sees a dermatologist at [facility].    She had gone to see a neurologist because she was having severe memory issues when she was working as a . She was having really bad memory issues and stress. She was put on amitriptyline because she was having trouble sleeping then too. She takes propranolol for essential tremors and all her medications at night. She takes colestipol and sucralfate.    SOCIAL HISTORY  She uses vape pens with CBD products and smokes marijuana at night.    MEDICATIONS  Current: propranolol, losartan, vitamin D3, colestipol, sucralfate  Past: amitriptyline, Ambien, Lunesta        Objective   Vital Signs:  /78 (BP Location: Left arm, Patient Position: Sitting, Cuff Size: Adult)   Pulse 65   Ht 157 cm (61.81\")   Wt 75.9 kg (167 lb 6 oz)   SpO2 98%   BMI 30.80 kg/m²   Estimated body mass index is 30.8 kg/m² as calculated from the following:    Height as of this encounter: 157 cm (61.81\").    Weight as of this encounter: 75.9 kg (167 lb 6 oz).            The following portions of the patient's history were reviewed and updated as appropriate: allergies, current medications, past family history, past medical history, past social history, past surgical history, and problem list.       Physical Exam  Vitals reviewed.   Constitutional:       General: She is not in acute distress.     " Appearance: She is obese. She is not ill-appearing.   HENT:      Right Ear: Tympanic membrane and ear canal normal. There is no impacted cerumen.      Left Ear: Tympanic membrane and ear canal normal. There is no impacted cerumen.   Cardiovascular:      Rate and Rhythm: Normal rate and regular rhythm.   Pulmonary:      Effort: Pulmonary effort is normal.      Breath sounds: Normal breath sounds.   Musculoskeletal:      Left knee: No swelling, deformity, effusion, erythema, ecchymosis or bony tenderness. No tenderness.      Right lower leg: Edema (trace) present.      Left lower leg: Edema (trace) present.   Neurological:      Mental Status: She is alert.   Psychiatric:         Mood and Affect: Mood normal.        Result Review :                Assessment and Plan   Diagnoses and all orders for this visit:    1. Tinnitus of both ears (Primary)  -     Ambulatory Referral to ENT (Otolaryngology)    2. Peripheral edema    3. Claudication    4. Stage 3a chronic kidney disease  -     Basic Metabolic Panel; Future  -     Protein / Creatinine Ratio, Urine - Urine, Clean Catch; Future    5. Primary insomnia  -     zolpidem (AMBIEN) 5 MG tablet; Take 1 tablet by mouth Every Night.  Dispense: 30 tablet; Refill: 2    6. Tremor, essential  -     propranolol LA (INDERAL LA) 60 MG 24 hr capsule; Take 1 capsule by mouth Every Night.  Dispense: 90 capsule; Refill: 3    7. Essential hypertension  -     losartan (COZAAR) 25 MG tablet; Take 1 tablet by mouth Daily.  Dispense: 90 tablet; Refill: 1    8. Vitamin D deficiency  Comments:  Her vitamin D level is well controlled.  Orders:  -     Cholecalciferol (Vitamin D3) 50 MCG (2000 UT) capsule; Take 1 capsule by mouth Daily.  Dispense: 90 capsule; Refill: 3    9. Mixed hyperlipidemia  -     ezetimibe (ZETIA) 10 MG tablet; Take 1 tablet by mouth Daily.  Dispense: 90 tablet; Refill: 1    10. Controlled substance agreement signed             Assessment & Plan  1. Tinnitus.  A referral to  an Ear, Nose, and Throat specialist at [facility] will be arranged for a second opinion on her tinnitus.    2.  Claudication  The pain appears to be localized in the calf muscle rather than the knee joint, with no palpable knots or masses upon examination. The possibility of a Baker's cyst is unlikely. The pain could potentially be indicative of claudication. Coordination of care with Dr. Bates will be initiated to discuss the possibility of claudication and consider a follow-up Doppler study.    3. Insomnia.  A prescription for Ambien 5 mg has been provided, with instructions to take it nightly. A controlled substance agreement has been signed. The potential risks and side effects of Ambien, including grogginess and sleep-related behaviors, have been discussed. She has been advised against using marijuana concurrently with Ambien until its effects are known.  Follow-up in 1 month for reassessment.  Advised office policies that she will need to be seen every 3 months for follow-up while on this medication.  Dmitriy report reviewed.    4. Leg swelling.  Her kidney function has shown a slight decline over the past few years, necessitating caution in the use of diuretics. The current level of leg swelling does not warrant the use of diuretics. She has been advised to use compression socks to manage the leg swelling.     5. Health maintenance.  She has been advised to schedule a follow-up visit for her Medicare wellness check and to update all her screenings, including a mammogram and colonoscopy.    6.  CKD.  Reviewing labs from previous providers she shows signs of stage III chronic kidney disease.  Check urine protein creatinine ratio as well as creatinine and GFR today. The potential causes of kidney disease have been discussed, with hypertension identified as a risk factor in her case.     7.  Hyperlipidemia.  Continue Zetia.  Cardiology has been managing Repatha.  She has hives with reaction to Crestor.    8.   Vitamin D deficiency.  Continue supplementation.    9.  Essential tremor.  Continue propranolol    10.  Hypertension.  Stable.  Continue losartan.      Follow-up  The patient will follow up next month.      Follow Up   Return for [facility].  Patient was given instructions and counseling regarding her condition or for health maintenance advice. Please see specific information pulled into the AVS if appropriate.         Patient or patient representative verbalized consent for the use of Ambient Listening during the visit with  Karin Marsh MD for chart documentation. 3/25/2025  15:26 EDT    Electronically signed by Karin Marsh MD, 03/25/25, 3:28 PM EDT.     Patient expressed no known problems or needs

## 2025-06-25 ENCOUNTER — TELEPHONE (OUTPATIENT)
Age: 65
End: 2025-06-25

## 2025-06-25 ENCOUNTER — TELEPHONE (OUTPATIENT)
Dept: CARDIOLOGY | Facility: CLINIC | Age: 65
End: 2025-06-25

## 2025-06-25 NOTE — TELEPHONE ENCOUNTER
Caller: Bianka Fisher    Relationship: Self    Best call back number: [phone]     Who is your current provider: DR. JAIN     Is your current provider offboarding? NO     Who would you like your new provider to be: Dean Mello MD    What are your reasons for transferring care: HARD TO SEE AND MAKE APPTS WITH HEART DR OFFICE, PT WAS RECENTLY DX WITH kidney disease.

## 2025-06-25 NOTE — TELEPHONE ENCOUNTER
Caller: Bianka Fisher    Relationship: Self    Best call back number: [phone]     Who is your current provider: DR. JAIN     Is your current provider offboarding? NO     Who would you like your new provider to be: Dean Mello MD    What are your reasons for transferring care: HARD TO SEE AND MAKE APPTS WITH HEART DR OFFICE, PT WAS RECENTLY DX WITH kidney disease.

## 2025-07-02 ENCOUNTER — TELEPHONE (OUTPATIENT)
Age: 65
End: 2025-07-02
Payer: MEDICARE

## 2025-07-02 ENCOUNTER — PATIENT MESSAGE (OUTPATIENT)
Age: 65
End: 2025-07-02
Payer: MEDICARE

## 2025-07-02 ENCOUNTER — PRIOR AUTHORIZATION (OUTPATIENT)
Dept: GASTROENTEROLOGY | Facility: CLINIC | Age: 65
End: 2025-07-02
Payer: MEDICARE

## 2025-07-02 NOTE — TELEPHONE ENCOUNTER
Please call the GI office about colonoscopy bowel prep medication.  Her most recent creatinine is 1.28 with a GFR of 46.6.  Please make sure that the provider knows she has chronic kidney disease and is using a bowel prep safe for chronic kidney disease.

## 2025-07-02 NOTE — TELEPHONE ENCOUNTER
Approved today by [employer] 2017  [place] Case: [id number], Status: Approved, Coverage Starts on: 4/2/2025 12:00:00 AM, Coverage Ends on: 7/2/2026 12:00:00 AM.  Effective Date: 4/2/2025  Authorization Expiration Date: 7/2/2026

## 2025-07-03 ENCOUNTER — TELEPHONE (OUTPATIENT)
Dept: GASTROENTEROLOGY | Facility: CLINIC | Age: 65
End: 2025-07-03

## 2025-07-03 NOTE — TELEPHONE ENCOUNTER
Mellisa Alonzo not sure how to advise on this? Can you please advise her procedure is on 7/15/2025

## 2025-07-03 NOTE — TELEPHONE ENCOUNTER
Called spoke with Chika at Gastro , she put in message to relay regarding the colonscopy pt is schedule for on 7/15/25 She stated she would send the message and if any questions office will contact us back.   per Dr GRANGER   Please call the GI office about colonoscopy bowel prep medication.  Her most recent creatinine is 1.28 with a GFR of 46.6.  Please make sure that the provider knows she has chronic kidney disease and is using a bowel prep safe for chronic kidney disease.

## 2025-07-03 NOTE — TELEPHONE ENCOUNTER
Hub staff attempted to follow warm transfer process and was unsuccessful     Caller: DR FRYE OFFICE    Relationship to patient: PCP    Best call back number: [phone]    Patient is needing: VONDA WILL BE IN THE [facility] OFFICE TODAY UNTIL 2 AND THEN NOT BACK UNTIL MONDAY IF THERE ARE ANY FURTHER QUESTIONS. PATIENT MOST RECENT CREATNINE IS 1.28 WITH A GFR OF 46.6. JUST DOUBLE CHECKING WITH PATIENT DIAGNOSIS OF CHRONIC KIDNEY DISEASE THAT THIS BOWEL PREP IS SAFE FOR THAT. PLEASE CALL BACK AND DISCUSS WITH PCP OFFICE

## 2025-07-15 ENCOUNTER — OUTSIDE FACILITY SERVICE (OUTPATIENT)
Dept: GASTROENTEROLOGY | Facility: CLINIC | Age: 65
End: 2025-07-15
Payer: MEDICARE

## 2025-07-15 PROCEDURE — 88305 TISSUE EXAM BY PATHOLOGIST: CPT | Performed by: INTERNAL MEDICINE

## 2025-07-16 ENCOUNTER — LAB REQUISITION (OUTPATIENT)
Dept: LAB | Facility: HOSPITAL | Age: 65
End: 2025-07-16
Payer: MEDICARE

## 2025-07-16 DIAGNOSIS — Z12.11 ENCOUNTER FOR SCREENING FOR MALIGNANT NEOPLASM OF COLON: ICD-10-CM

## 2025-08-01 ENCOUNTER — HOSPITAL ENCOUNTER (OUTPATIENT)
Dept: BONE DENSITY | Facility: HOSPITAL | Age: 65
Discharge: HOME | End: 2025-08-01
Payer: MEDICARE

## 2025-08-01 ENCOUNTER — HOSPITAL ENCOUNTER (OUTPATIENT)
Dept: MAMMOGRAPHY | Facility: HOSPITAL | Age: 65
Discharge: HOME | End: 2025-08-01
Admitting: FAMILY MEDICINE
Payer: MEDICARE

## 2025-08-01 DIAGNOSIS — Z12.31 ENCOUNTER FOR SCREENING MAMMOGRAM FOR BREAST CANCER: ICD-10-CM

## 2025-08-01 DIAGNOSIS — Z13.820 ENCOUNTER FOR SCREENING FOR OSTEOPOROSIS: ICD-10-CM

## 2025-08-01 DIAGNOSIS — Z78.0 MENOPAUSE: ICD-10-CM

## 2025-08-01 PROCEDURE — 77080 DXA BONE DENSITY AXIAL: CPT

## 2025-08-01 PROCEDURE — 77067 SCR MAMMO BI INCL CAD: CPT

## 2025-08-01 PROCEDURE — 77063 BREAST TOMOSYNTHESIS BI: CPT

## 2025-08-05 PROBLEM — M85.89 OSTEOPENIA OF MULTIPLE SITES: Status: ACTIVE | Noted: 2025-08-05

## 2025-08-08 ENCOUNTER — OFFICE VISIT (OUTPATIENT)
Age: 65
End: 2025-08-08
Payer: MEDICARE

## 2025-08-08 VITALS
SYSTOLIC BLOOD PRESSURE: 126 MMHG | DIASTOLIC BLOOD PRESSURE: 80 MMHG | BODY MASS INDEX: 27.97 KG/M2 | HEART RATE: 76 BPM | HEIGHT: 62 IN | WEIGHT: 152 LBS

## 2025-08-08 DIAGNOSIS — R07.89 OTHER CHEST PAIN: ICD-10-CM

## 2025-08-08 DIAGNOSIS — F51.01 PRIMARY INSOMNIA: Primary | ICD-10-CM

## 2025-08-08 DIAGNOSIS — M85.89 OSTEOPENIA OF MULTIPLE SITES: ICD-10-CM

## 2025-08-08 DIAGNOSIS — I10 ESSENTIAL HYPERTENSION: ICD-10-CM

## 2025-08-08 DIAGNOSIS — G89.29 CHRONIC RIGHT UPPER QUADRANT PAIN: ICD-10-CM

## 2025-08-08 DIAGNOSIS — E78.2 MIXED HYPERLIPIDEMIA: ICD-10-CM

## 2025-08-08 DIAGNOSIS — R68.82 DECREASED LIBIDO: ICD-10-CM

## 2025-08-08 DIAGNOSIS — R10.11 CHRONIC RIGHT UPPER QUADRANT PAIN: ICD-10-CM

## 2025-08-08 DIAGNOSIS — N18.31 STAGE 3A CHRONIC KIDNEY DISEASE: ICD-10-CM

## 2025-08-08 DIAGNOSIS — D12.6 TUBULAR ADENOMA OF COLON: ICD-10-CM

## 2025-08-08 RX ORDER — LOSARTAN POTASSIUM 25 MG/1
25 TABLET ORAL DAILY
Qty: 90 TABLET | Refills: 1 | Status: SHIPPED | OUTPATIENT
Start: 2025-08-08

## 2025-08-15 ENCOUNTER — OFFICE VISIT (OUTPATIENT)
Age: 65
End: 2025-08-15
Payer: MEDICARE

## 2025-08-15 ENCOUNTER — LAB (OUTPATIENT)
Age: 65
End: 2025-08-15
Payer: MEDICARE

## 2025-08-15 VITALS
HEART RATE: 60 BPM | DIASTOLIC BLOOD PRESSURE: 97 MMHG | HEIGHT: 62 IN | SYSTOLIC BLOOD PRESSURE: 167 MMHG | BODY MASS INDEX: 27.97 KG/M2 | WEIGHT: 152 LBS

## 2025-08-15 DIAGNOSIS — N18.31 STAGE 3A CHRONIC KIDNEY DISEASE: ICD-10-CM

## 2025-08-15 DIAGNOSIS — E78.5 HYPERLIPIDEMIA LDL GOAL <100: ICD-10-CM

## 2025-08-15 DIAGNOSIS — I10 PRIMARY HYPERTENSION: ICD-10-CM

## 2025-08-15 DIAGNOSIS — E78.2 MIXED HYPERLIPIDEMIA: ICD-10-CM

## 2025-08-15 DIAGNOSIS — I10 ESSENTIAL HYPERTENSION: ICD-10-CM

## 2025-08-15 DIAGNOSIS — R07.2 PRECORDIAL PAIN: Primary | ICD-10-CM

## 2025-08-15 LAB
ALBUMIN SERPL-MCNC: 4.1 G/DL (ref 3.5–5.2)
ALBUMIN/GLOB SERPL: 1.5 G/DL
ALP SERPL-CCNC: 82 U/L (ref 39–117)
ALT SERPL W P-5'-P-CCNC: 11 U/L (ref 1–33)
ANION GAP SERPL CALCULATED.3IONS-SCNC: 10.2 MMOL/L (ref 5–15)
AST SERPL-CCNC: 22 U/L (ref 1–32)
BILIRUB SERPL-MCNC: 1.3 MG/DL (ref 0–1.2)
BUN SERPL-MCNC: 8 MG/DL (ref 8–23)
BUN/CREAT SERPL: 8.2 (ref 7–25)
CALCIUM SPEC-SCNC: 10.1 MG/DL (ref 8.6–10.5)
CHLORIDE SERPL-SCNC: 105 MMOL/L (ref 98–107)
CHOLEST SERPL-MCNC: 167 MG/DL (ref 0–200)
CO2 SERPL-SCNC: 24.8 MMOL/L (ref 22–29)
CREAT SERPL-MCNC: 0.97 MG/DL (ref 0.57–1)
EGFRCR SERPLBLD CKD-EPI 2021: 65 ML/MIN/1.73
GLOBULIN UR ELPH-MCNC: 2.8 GM/DL
GLUCOSE SERPL-MCNC: 95 MG/DL (ref 65–99)
HDLC SERPL-MCNC: 57 MG/DL (ref 40–60)
LDLC SERPL CALC-MCNC: 89 MG/DL (ref 0–100)
LDLC/HDLC SERPL: 1.52 {RATIO}
POTASSIUM SERPL-SCNC: 4.2 MMOL/L (ref 3.5–5.2)
PROT SERPL-MCNC: 6.9 G/DL (ref 6–8.5)
SODIUM SERPL-SCNC: 140 MMOL/L (ref 136–145)
TRIGL SERPL-MCNC: 116 MG/DL (ref 0–150)
VLDLC SERPL-MCNC: 21 MG/DL (ref 5–40)

## 2025-08-15 PROCEDURE — 36415 COLL VENOUS BLD VENIPUNCTURE: CPT | Performed by: FAMILY MEDICINE

## 2025-08-15 PROCEDURE — 80061 LIPID PANEL: CPT | Performed by: FAMILY MEDICINE

## 2025-08-15 PROCEDURE — 80053 COMPREHEN METABOLIC PANEL: CPT | Performed by: FAMILY MEDICINE
